# Patient Record
Sex: FEMALE | Race: WHITE | NOT HISPANIC OR LATINO | Employment: OTHER | URBAN - METROPOLITAN AREA
[De-identification: names, ages, dates, MRNs, and addresses within clinical notes are randomized per-mention and may not be internally consistent; named-entity substitution may affect disease eponyms.]

---

## 2017-01-11 RX ORDER — LEVOTHYROXINE SODIUM 0.05 MG/1
TABLET ORAL
Qty: 30 TABLET | Refills: 5 | Status: SHIPPED | OUTPATIENT
Start: 2017-01-11 | End: 2017-07-13 | Stop reason: SDUPTHER

## 2017-02-10 RX ORDER — TEMAZEPAM 7.5 MG/1
CAPSULE ORAL
Qty: 30 CAPSULE | Refills: 0 | OUTPATIENT
Start: 2017-02-10 | End: 2017-03-13 | Stop reason: SDUPTHER

## 2017-03-13 RX ORDER — TEMAZEPAM 7.5 MG/1
CAPSULE ORAL
Qty: 30 CAPSULE | Refills: 2 | OUTPATIENT
Start: 2017-03-13 | End: 2017-06-09 | Stop reason: SDUPTHER

## 2017-04-17 ENCOUNTER — TELEPHONE (OUTPATIENT)
Dept: INTERNAL MEDICINE | Facility: CLINIC | Age: 67
End: 2017-04-17

## 2017-05-12 RX ORDER — BUPROPION HYDROCHLORIDE 300 MG/1
TABLET ORAL
Qty: 30 TABLET | Refills: 5 | Status: SHIPPED | OUTPATIENT
Start: 2017-05-12 | End: 2017-11-03 | Stop reason: SDUPTHER

## 2017-05-15 DIAGNOSIS — E78.2 MIXED HYPERLIPIDEMIA: ICD-10-CM

## 2017-05-15 DIAGNOSIS — Z11.59 NEED FOR HEPATITIS C SCREENING TEST: ICD-10-CM

## 2017-05-15 DIAGNOSIS — Z00.00 HEALTH CARE MAINTENANCE: Primary | ICD-10-CM

## 2017-05-15 DIAGNOSIS — E55.9 VITAMIN D DEFICIENCY: ICD-10-CM

## 2017-05-15 DIAGNOSIS — M85.80 OSTEOPENIA: ICD-10-CM

## 2017-05-15 DIAGNOSIS — E03.9 ACQUIRED HYPOTHYROIDISM: ICD-10-CM

## 2017-05-15 DIAGNOSIS — F51.01 PRIMARY INSOMNIA: ICD-10-CM

## 2017-05-17 LAB
25(OH)D3+25(OH)D2 SERPL-MCNC: 29.4 NG/ML (ref 30–100)
ALBUMIN SERPL-MCNC: 4.1 G/DL (ref 3.5–5.2)
ALBUMIN/GLOB SERPL: 1.7 G/DL
ALP SERPL-CCNC: 49 U/L (ref 39–117)
ALT SERPL-CCNC: 16 U/L (ref 1–33)
APPEARANCE UR: CLEAR
AST SERPL-CCNC: 15 U/L (ref 1–32)
BACTERIA #/AREA URNS HPF: NORMAL /HPF
BASOPHILS # BLD AUTO: 0.04 10*3/MM3 (ref 0–0.2)
BASOPHILS NFR BLD AUTO: 0.5 % (ref 0–1.5)
BILIRUB SERPL-MCNC: 0.3 MG/DL (ref 0.1–1.2)
BILIRUB UR QL STRIP: NEGATIVE
BUN SERPL-MCNC: 16 MG/DL (ref 8–23)
BUN/CREAT SERPL: 18.4 (ref 7–25)
CALCIUM SERPL-MCNC: 9.4 MG/DL (ref 8.6–10.5)
CHLORIDE SERPL-SCNC: 102 MMOL/L (ref 98–107)
CHOLEST SERPL-MCNC: 288 MG/DL (ref 0–200)
CO2 SERPL-SCNC: 27.3 MMOL/L (ref 22–29)
COLOR UR: YELLOW
CREAT SERPL-MCNC: 0.87 MG/DL (ref 0.57–1)
EOSINOPHIL # BLD AUTO: 0.26 10*3/MM3 (ref 0–0.7)
EOSINOPHIL NFR BLD AUTO: 3.5 % (ref 0.3–6.2)
EPI CELLS #/AREA URNS HPF: NORMAL /HPF
ERYTHROCYTE [DISTWIDTH] IN BLOOD BY AUTOMATED COUNT: 13.4 % (ref 11.7–13)
GLOBULIN SER CALC-MCNC: 2.4 GM/DL
GLUCOSE SERPL-MCNC: 97 MG/DL (ref 65–99)
GLUCOSE UR QL: NEGATIVE
HCT VFR BLD AUTO: 44.9 % (ref 35.6–45.5)
HCV AB S/CO SERPL IA: <0.1 S/CO RATIO (ref 0–0.9)
HDLC SERPL-MCNC: 77 MG/DL (ref 40–60)
HGB BLD-MCNC: 14.8 G/DL (ref 11.9–15.5)
HGB UR QL STRIP: NEGATIVE
IMM GRANULOCYTES # BLD: 0.04 10*3/MM3 (ref 0–0.03)
IMM GRANULOCYTES NFR BLD: 0.5 % (ref 0–0.5)
KETONES UR QL STRIP: NEGATIVE
LDLC SERPL CALC-MCNC: 183 MG/DL (ref 0–100)
LEUKOCYTE ESTERASE UR QL STRIP: NEGATIVE
LYMPHOCYTES # BLD AUTO: 2.05 10*3/MM3 (ref 0.9–4.8)
LYMPHOCYTES NFR BLD AUTO: 27.3 % (ref 19.6–45.3)
MCH RBC QN AUTO: 30.3 PG (ref 26.9–32)
MCHC RBC AUTO-ENTMCNC: 33 G/DL (ref 32.4–36.3)
MCV RBC AUTO: 91.8 FL (ref 80.5–98.2)
MICRO URNS: NORMAL
MICRO URNS: NORMAL
MONOCYTES # BLD AUTO: 0.46 10*3/MM3 (ref 0.2–1.2)
MONOCYTES NFR BLD AUTO: 6.1 % (ref 5–12)
NEUTROPHILS # BLD AUTO: 4.67 10*3/MM3 (ref 1.9–8.1)
NEUTROPHILS NFR BLD AUTO: 62.1 % (ref 42.7–76)
NITRITE UR QL STRIP: NEGATIVE
PH UR STRIP: 7.5 [PH] (ref 5–7.5)
PLATELET # BLD AUTO: 269 10*3/MM3 (ref 140–500)
POTASSIUM SERPL-SCNC: 4.5 MMOL/L (ref 3.5–5.2)
PROT SERPL-MCNC: 6.5 G/DL (ref 6–8.5)
PROT UR QL STRIP: NEGATIVE
RBC # BLD AUTO: 4.89 10*6/MM3 (ref 3.9–5.2)
RBC #/AREA URNS HPF: NORMAL /HPF
SODIUM SERPL-SCNC: 142 MMOL/L (ref 136–145)
SP GR UR: 1.01 (ref 1–1.03)
TRIGL SERPL-MCNC: 139 MG/DL (ref 0–150)
TSH SERPL DL<=0.005 MIU/L-ACNC: 1.63 MIU/ML (ref 0.27–4.2)
URINALYSIS REFLEX: NORMAL
UROBILINOGEN UR STRIP-MCNC: 0.2 MG/DL (ref 0.2–1)
VLDLC SERPL CALC-MCNC: 27.8 MG/DL (ref 5–40)
WBC # BLD AUTO: 7.52 10*3/MM3 (ref 4.5–10.7)
WBC #/AREA URNS HPF: NORMAL /HPF

## 2017-05-23 ENCOUNTER — OFFICE VISIT (OUTPATIENT)
Dept: INTERNAL MEDICINE | Facility: CLINIC | Age: 67
End: 2017-05-23

## 2017-05-23 VITALS
OXYGEN SATURATION: 98 % | DIASTOLIC BLOOD PRESSURE: 78 MMHG | HEIGHT: 62 IN | HEART RATE: 79 BPM | BODY MASS INDEX: 24.84 KG/M2 | SYSTOLIC BLOOD PRESSURE: 122 MMHG | RESPIRATION RATE: 18 BRPM | WEIGHT: 135 LBS

## 2017-05-23 DIAGNOSIS — E03.9 ACQUIRED HYPOTHYROIDISM: ICD-10-CM

## 2017-05-23 DIAGNOSIS — F51.01 PRIMARY INSOMNIA: ICD-10-CM

## 2017-05-23 DIAGNOSIS — E78.2 MIXED HYPERLIPIDEMIA: ICD-10-CM

## 2017-05-23 DIAGNOSIS — Z00.00 MEDICARE ANNUAL WELLNESS VISIT, SUBSEQUENT: Primary | ICD-10-CM

## 2017-05-23 DIAGNOSIS — M79.7 FIBROMYALGIA: ICD-10-CM

## 2017-05-23 PROCEDURE — G0439 PPPS, SUBSEQ VISIT: HCPCS | Performed by: INTERNAL MEDICINE

## 2017-05-23 PROCEDURE — 99213 OFFICE O/P EST LOW 20 MIN: CPT | Performed by: INTERNAL MEDICINE

## 2017-06-09 RX ORDER — TEMAZEPAM 7.5 MG/1
CAPSULE ORAL
Qty: 30 CAPSULE | Refills: 2 | Status: SHIPPED | OUTPATIENT
Start: 2017-06-09 | End: 2017-09-07 | Stop reason: SDUPTHER

## 2017-06-09 NOTE — TELEPHONE ENCOUNTER
Restoril refill.  Jacinto UTD 3/14/2017  Narc UTD 9/28/2016  Last refill 3/13/2017  Last OV 5/23/2017

## 2017-06-12 RX ORDER — TEMAZEPAM 7.5 MG/1
CAPSULE ORAL
Qty: 30 CAPSULE | Refills: 2 | OUTPATIENT
Start: 2017-06-12

## 2017-06-12 NOTE — TELEPHONE ENCOUNTER
Approved on Friday, was not called in by covering staff. Calling in this morning and will talk to pharmacy for clarification.

## 2017-07-13 RX ORDER — LEVOTHYROXINE SODIUM 0.05 MG/1
TABLET ORAL
Qty: 30 TABLET | Refills: 5 | Status: SHIPPED | OUTPATIENT
Start: 2017-07-13 | End: 2017-12-30 | Stop reason: SDUPTHER

## 2017-08-28 ENCOUNTER — APPOINTMENT (OUTPATIENT)
Dept: WOMENS IMAGING | Facility: HOSPITAL | Age: 67
End: 2017-08-28

## 2017-08-28 PROCEDURE — G0202 SCR MAMMO BI INCL CAD: HCPCS | Performed by: RADIOLOGY

## 2017-08-28 PROCEDURE — 77063 BREAST TOMOSYNTHESIS BI: CPT | Performed by: RADIOLOGY

## 2017-09-07 RX ORDER — TEMAZEPAM 7.5 MG/1
CAPSULE ORAL
Qty: 30 CAPSULE | Refills: 2 | OUTPATIENT
Start: 2017-09-07 | End: 2017-12-01 | Stop reason: SDUPTHER

## 2017-11-03 RX ORDER — BUPROPION HYDROCHLORIDE 300 MG/1
TABLET ORAL
Qty: 30 TABLET | Refills: 5 | Status: SHIPPED | OUTPATIENT
Start: 2017-11-03 | End: 2018-05-31 | Stop reason: SDUPTHER

## 2017-12-04 RX ORDER — TEMAZEPAM 7.5 MG/1
CAPSULE ORAL
Qty: 30 CAPSULE | Refills: 2 | OUTPATIENT
Start: 2017-12-04 | End: 2018-03-02 | Stop reason: SDUPTHER

## 2017-12-05 RX ORDER — TEMAZEPAM 7.5 MG/1
CAPSULE ORAL
Qty: 30 CAPSULE | Refills: 2 | OUTPATIENT
Start: 2017-12-05

## 2018-01-02 RX ORDER — LEVOTHYROXINE SODIUM 0.05 MG/1
TABLET ORAL
Qty: 30 TABLET | Refills: 5 | Status: SHIPPED | OUTPATIENT
Start: 2018-01-02 | End: 2018-05-24 | Stop reason: SDUPTHER

## 2018-01-03 ENCOUNTER — OFFICE VISIT (OUTPATIENT)
Dept: INTERNAL MEDICINE | Facility: CLINIC | Age: 68
End: 2018-01-03

## 2018-01-03 VITALS
HEART RATE: 93 BPM | RESPIRATION RATE: 18 BRPM | WEIGHT: 139 LBS | SYSTOLIC BLOOD PRESSURE: 122 MMHG | DIASTOLIC BLOOD PRESSURE: 84 MMHG | HEIGHT: 62 IN | OXYGEN SATURATION: 98 % | BODY MASS INDEX: 25.58 KG/M2 | TEMPERATURE: 98.6 F

## 2018-01-03 DIAGNOSIS — H66.001 ACUTE SUPPURATIVE OTITIS MEDIA OF RIGHT EAR WITHOUT SPONTANEOUS RUPTURE OF TYMPANIC MEMBRANE, RECURRENCE NOT SPECIFIED: Primary | ICD-10-CM

## 2018-01-03 DIAGNOSIS — J02.9 PHARYNGITIS, UNSPECIFIED ETIOLOGY: ICD-10-CM

## 2018-01-03 LAB
EXPIRATION DATE: NORMAL
INTERNAL CONTROL: NORMAL
Lab: NORMAL
S PYO AG THROAT QL: NEGATIVE

## 2018-01-03 PROCEDURE — 87880 STREP A ASSAY W/OPTIC: CPT | Performed by: INTERNAL MEDICINE

## 2018-01-03 PROCEDURE — 99214 OFFICE O/P EST MOD 30 MIN: CPT | Performed by: INTERNAL MEDICINE

## 2018-01-03 RX ORDER — CEFDINIR 300 MG/1
300 CAPSULE ORAL 2 TIMES DAILY
Qty: 20 CAPSULE | Refills: 0 | Status: SHIPPED | OUTPATIENT
Start: 2018-01-03 | End: 2018-01-17

## 2018-01-03 NOTE — PROGRESS NOTES
Chief Complaint  Quyen Jalloh is a 67 y.o. female who presents for URI (Green mucous from nose.); Earache (Right ear, pressure); Headache; and Sore Throat  .    History of Present Illness   Quyen is here for acute care for a new issue.  Yesterday she started with an ear ache.  She can't hear out of her right ear.  No fevers.  Sore throat.  Headache.  She feels terrible.      Review of Systems   Constitution: Positive for malaise/fatigue. Negative for chills and fever.   HENT: Positive for congestion, ear pain and sore throat. Negative for ear discharge.    Neurological: Positive for headaches.       Patient Active Problem List   Diagnosis   • Adrenal cortical adenoma   • Decreased hearing   • Fatigue   • Hyperlipidemia   • Hypothyroidism   • Insomnia   • Seasonal allergic rhinitis   • Clicking shoulder   • Shoulder pain   • Vitamin D deficiency   • Osteopenia   • Fibromyalgia       Past Medical History:   Diagnosis Date   • Bloating    • Depression    • Diverticulitis of colon 1/28/2016    Impression: 11/18/2015 - She had diverticulitis last year.  I think this is a mild flare compared to where she was last year.  Will go ahead and put her on Augmentin for 10 days.  Her WBC is normal now.  Impression: 04/10/2014 - Because of her blood in lucy stool, I am ordering the stat CT.  Will treat as outpt as long as there is not perf seen on CT.  I have put in Augmentin for TX so she doesn't have to use Flagyl.;    • Fibromyalgia     She had been folllowed by Dr. Heineke but he has retired.  She has been unable to find another FM specialist in the area.  She tried seeing Dr. Cordova, but this was a pain clinic and he pushed meds on her that she didn't want to take. She is not interested in narcotics for pain control.  I have agreed to continue her on her current meds and to fill out her disability forms when they come due.  H   • Gallstones    • Hyperlipidemia    • Leukocytosis    • Malignant melanoma of skin    •  Non-specific colitis 1/28/2016    Impression: 12/07/2015 - CT from [facility] reviewed.  There is some generalized edematous wall thickening in lucy entire rectosigmoid colon and some in descending colon.  She is tolerating the Augmentin at this point.  I would like for her to see a GI doctor to try to determine what kind of colitis we are dealing with.  She is free to advance her diet as tolerated and I think this will actually help bulk up her stools.  Her WBC has been normal through all of this.;    • Osteopenia 04/13/2015    She will due for dexa next year.[id number] Appointment   • Polyp of sigmoid colon     Due for colonoscopy.  She will contact Dr. Harry Atwood's office to set this up.   • Tubular adenoma of colon 07/31/2014    c-scope Dr. Ornelas - repeat scope 5  years       Past Surgical History:   Procedure Laterality Date   • CHOLECYSTECTOMY     • COLONOSCOPY  07/31/2014    Dr Ornelas, tubular adenoma x2   • MALIGNANT SKIN LESION EXCISION  2006    legs; melanoma   • TUBAL ABDOMINAL LIGATION         Family History   Problem Relation Age of Onset   • Cancer Mother      lung   • Coronary artery disease Father    • Glaucoma Father    • Lung cancer Father    • Cancer Father      malignant neoplasm of trachea, bronchus and lung   • Stroke Father        Social History     Social History   • Marital status:      Spouse name: N/A   • Number of children: N/A   • Years of education: N/A     Occupational History   • Not on file.     Social History Main Topics   • Smoking status: Never Smoker   • Smokeless tobacco: Never Used   • Alcohol use Yes      Comment: social drinker   • Drug use: No   • Sexual activity: Not on file     Other Topics Concern   • Not on file     Social History Narrative       Current Outpatient Prescriptions on File Prior to Visit   Medication Sig Dispense Refill   • buPROPion XL (WELLBUTRIN XL) 300 MG 24 hr tablet TAKE 1 TABLET BY MOUTH EVERY DAY 30 tablet 5   • CALCIUM PO Take by mouth.    "  • Cholecalciferol (VITAMIN D-3) 1000 UNITS capsule Take by mouth. Take as directed     • levothyroxine (SYNTHROID, LEVOTHROID) 50 MCG tablet TAKE ONE TABLET BY MOUTH EVERY DAY 30 tablet 5   • Magnesium 100 MG capsule Take  by mouth.     • melatonin 5 MG tablet tablet Take by mouth. Take as directed     • Pyridoxine HCl (VITAMIN B-6 PO) Take by mouth.     • temazepam (RESTORIL) 7.5 MG capsule TAKE ONE CAPSULE BY MOUTH EVERY NIGHT AS NEEDED 30 capsule 2     No current facility-administered medications on file prior to visit.        Allergies   Allergen Reactions   • Tetracyclines & Related        Vitals:    01/03/18 1243   BP: 122/84   Pulse: 93   Resp: 18   Temp: 98.6 °F (37 °C)   SpO2: 98%   Weight: 63 kg (139 lb)   Height: 157.5 cm (62.01\")       Body mass index is 25.42 kg/(m^2).    Objective   Physical Exam   Constitutional: She is oriented to person, place, and time. She appears well-developed and well-nourished.  Non-toxic appearance. She has a sickly appearance. No distress.   HENT:   Head: Normocephalic and atraumatic.   Right Ear: External ear normal. No drainage. Tympanic membrane is erythematous and bulging. A middle ear effusion is present. Decreased hearing is noted.   Left Ear: Tympanic membrane, external ear and ear canal normal.   Eyes: Conjunctivae are normal. No scleral icterus.   Neck: Normal range of motion. Neck supple.   Cardiovascular: Normal rate and regular rhythm.    Pulmonary/Chest: Effort normal and breath sounds normal. No respiratory distress. She has no wheezes.   Neurological: She is alert and oriented to person, place, and time. No cranial nerve deficit.   Psychiatric: She has a normal mood and affect. Her behavior is normal.       Results for orders placed or performed in visit on 01/03/18   POC Rapid Strep A   Result Value Ref Range    Rapid Strep A Screen Negative Negative, VALID, INVALID, Not Performed    Internal Control Passed Passed    Lot Number [id number]     Expiration " Date 3/31/2019        Assessment/Plan   Diagnoses and all orders for this visit:    Acute suppurative otitis media of right ear without spontaneous rupture of tympanic membrane, recurrence not specified  -     cefdinir (OMNICEF) 300 MG capsule; Take 1 capsule by mouth 2 (Two) Times a Day.    Pharyngitis, unspecified etiology  -     POC Rapid Strep A        Discussion/Summary  Quyen is here for acute care for a new issue.  She has an acute right otitis media.  Will start omnicef.  Use tylenol or advil as needed for pain.  Her rapid strep was negative.    No Follow-up on file.

## 2018-01-09 ENCOUNTER — TELEPHONE (OUTPATIENT)
Dept: INTERNAL MEDICINE | Facility: CLINIC | Age: 68
End: 2018-01-09

## 2018-01-09 NOTE — TELEPHONE ENCOUNTER
Pt called and stated that she was seen on the 3rd and was given an abx, but still is not feeling better and has a terrible cough, wants to know if there is anything else she can do?    Pt has tried mucinex, delsum, and cough drops, will wait until closer to the abx to update us on her condition.

## 2018-01-10 ENCOUNTER — OFFICE VISIT (OUTPATIENT)
Dept: INTERNAL MEDICINE | Facility: CLINIC | Age: 68
End: 2018-01-10

## 2018-01-10 ENCOUNTER — TELEPHONE (OUTPATIENT)
Dept: INTERNAL MEDICINE | Facility: CLINIC | Age: 68
End: 2018-01-10

## 2018-01-10 VITALS
RESPIRATION RATE: 18 BRPM | BODY MASS INDEX: 25.58 KG/M2 | OXYGEN SATURATION: 99 % | DIASTOLIC BLOOD PRESSURE: 72 MMHG | SYSTOLIC BLOOD PRESSURE: 126 MMHG | HEART RATE: 85 BPM | HEIGHT: 62 IN | WEIGHT: 139 LBS

## 2018-01-10 DIAGNOSIS — R05.9 COUGH: ICD-10-CM

## 2018-01-10 DIAGNOSIS — H66.004 RECURRENT ACUTE SUPPURATIVE OTITIS MEDIA OF RIGHT EAR WITHOUT SPONTANEOUS RUPTURE OF TYMPANIC MEMBRANE: ICD-10-CM

## 2018-01-10 DIAGNOSIS — R06.02 SHORTNESS OF BREATH: ICD-10-CM

## 2018-01-10 LAB
HCT VFR BLDA CALC: 43.6 %
HGB BLDA-MCNC: 14.4 G/DL
LYMPHOCYTES # BLD: 26.7 %
MCH, POC: 29.1
MCHC, POC: 33.1
MCV, POC: 88.1
MONOCYTES # BLD: 8.2 %
PLATELET # BLD: 401 10*3/MM3
PMV BLD: 6.8 FL
POC NEUTROPHIL: 65.1 %
RBC, POC: 4.95
RDW, POC: 12.7
WBC # BLD: 13.5 10*3/UL

## 2018-01-10 PROCEDURE — 85025 COMPLETE CBC W/AUTO DIFF WBC: CPT | Performed by: INTERNAL MEDICINE

## 2018-01-10 PROCEDURE — 99214 OFFICE O/P EST MOD 30 MIN: CPT | Performed by: INTERNAL MEDICINE

## 2018-01-10 PROCEDURE — 71046 X-RAY EXAM CHEST 2 VIEWS: CPT | Performed by: INTERNAL MEDICINE

## 2018-01-10 RX ORDER — LEVOFLOXACIN 750 MG/1
750 TABLET ORAL DAILY
Qty: 5 TABLET | Refills: 0 | Status: SHIPPED | OUTPATIENT
Start: 2018-01-10 | End: 2018-01-17

## 2018-01-10 NOTE — TELEPHONE ENCOUNTER
Pt called about her URI and she is having trouble getting a deep breath.   Pt was trying to wait until Thursday to call, but did not want to wait since she was struggling with SOA.

## 2018-01-10 NOTE — PROGRESS NOTES
Chief Complaint  Quyen Jalloh is a 67 y.o. female who presents for URI (Pt has worsened symptoms, having soa, could not wait until the end of the abx to f/u. )  .    History of Present Illness   Quyen is here for worsening URI symptoms.  She has been on omnicef for 8 days now and has worsening soa.  Her ear got better then got worse.  She is still blowing out a lot of green mucous.  If she moves around a lot she just feels like she has to take a deep breath.  She is coughing up sputum in the morning.  She has headaches with all of this.  She has spasms of coughing.  She is using mucinex.        Review of Systems   Constitution: Positive for malaise/fatigue. Negative for chills, fever and night sweats.   HENT: Positive for congestion, ear pain and sore throat. Negative for ear discharge.    Cardiovascular: Positive for dyspnea on exertion.   Respiratory: Positive for cough, shortness of breath and sputum production.        Patient Active Problem List   Diagnosis   • Adrenal cortical adenoma   • Decreased hearing   • Fatigue   • Hyperlipidemia   • Hypothyroidism   • Insomnia   • Seasonal allergic rhinitis   • Clicking shoulder   • Shoulder pain   • Vitamin D deficiency   • Osteopenia   • Fibromyalgia       Past Medical History:   Diagnosis Date   • Bloating    • Depression    • Diverticulitis of colon 1/28/2016    Impression: 11/18/2015 - She had diverticulitis last year.  I think this is a mild flare compared to where she was last year.  Will go ahead and put her on Augmentin for 10 days.  Her WBC is normal now.  Impression: 04/10/2014 - Because of her blood in lucy stool, I am ordering the stat CT.  Will treat as outpt as long as there is not perf seen on CT.  I have put in Augmentin for TX so she doesn't have to use Flagyl.;    • Fibromyalgia     She had been folllowed by Dr. Heineke but he has retired.  She has been unable to find another FM specialist in the area.  She tried seeing Dr. Cordova, but this was  a pain clinic and he pushed meds on her that she didn't want to take. She is not interested in narcotics for pain control.  I have agreed to continue her on her current meds and to fill out her disability forms when they come due.  H   • Gallstones    • Hyperlipidemia    • Leukocytosis    • Malignant melanoma of skin    • Non-specific colitis 1/28/2016    Impression: 12/07/2015 - CT from [facility] reviewed.  There is some generalized edematous wall thickening in lucy entire rectosigmoid colon and some in descending colon.  She is tolerating the Augmentin at this point.  I would like for her to see a GI doctor to try to determine what kind of colitis we are dealing with.  She is free to advance her diet as tolerated and I think this will actually help bulk up her stools.  Her WBC has been normal through all of this.;    • Osteopenia 04/13/2015    She will due for dexa next year.[id number] Appointment   • Polyp of sigmoid colon     Due for colonoscopy.  She will contact Dr. Harry Atwood's office to set this up.   • Tubular adenoma of colon 07/31/2014    c-scope Dr. Ornelas - repeat scope 5  years       Past Surgical History:   Procedure Laterality Date   • CHOLECYSTECTOMY     • COLONOSCOPY  07/31/2014    Dr Ornelas, tubular adenoma x2   • MALIGNANT SKIN LESION EXCISION  2006    legs; melanoma   • TUBAL ABDOMINAL LIGATION         Family History   Problem Relation Age of Onset   • Cancer Mother      lung   • Coronary artery disease Father    • Glaucoma Father    • Lung cancer Father    • Cancer Father      malignant neoplasm of trachea, bronchus and lung   • Stroke Father        Social History     Social History   • Marital status:      Spouse name: N/A   • Number of children: N/A   • Years of education: N/A     Occupational History   • Not on file.     Social History Main Topics   • Smoking status: Never Smoker   • Smokeless tobacco: Never Used   • Alcohol use Yes      Comment: social drinker   • Drug use: No   •  "Sexual activity: Not on file     Other Topics Concern   • Not on file     Social History Narrative       Current Outpatient Prescriptions on File Prior to Visit   Medication Sig Dispense Refill   • buPROPion XL (WELLBUTRIN XL) 300 MG 24 hr tablet TAKE 1 TABLET BY MOUTH EVERY DAY 30 tablet 5   • CALCIUM PO Take by mouth.     • cefdinir (OMNICEF) 300 MG capsule Take 1 capsule by mouth 2 (Two) Times a Day. 20 capsule 0   • Cholecalciferol (VITAMIN D-3) 1000 UNITS capsule Take by mouth. Take as directed     • levothyroxine (SYNTHROID, LEVOTHROID) 50 MCG tablet TAKE ONE TABLET BY MOUTH EVERY DAY 30 tablet 5   • Magnesium 100 MG capsule Take  by mouth.     • melatonin 5 MG tablet tablet Take by mouth. Take as directed     • Pyridoxine HCl (VITAMIN B-6 PO) Take by mouth.     • temazepam (RESTORIL) 7.5 MG capsule TAKE ONE CAPSULE BY MOUTH EVERY NIGHT AS NEEDED 30 capsule 2     No current facility-administered medications on file prior to visit.        Allergies   Allergen Reactions   • Tetracyclines & Related        Vitals:    01/10/18 1223   BP: 126/72   Pulse: 85   Resp: 18   SpO2: 99%   Weight: 63 kg (139 lb)   Height: 157.5 cm (62.01\")       Body mass index is 25.42 kg/(m^2).    Objective   Physical Exam   Constitutional: She is oriented to person, place, and time. She appears well-developed and well-nourished.  Non-toxic appearance. She appears ill. No distress.   HENT:   Right Ear: Tympanic membrane is erythematous and bulging. Tympanic membrane is not perforated. A middle ear effusion is present.   Left Ear: Tympanic membrane, external ear and ear canal normal.   Mouth/Throat: Mucous membranes are normal. Posterior oropharyngeal erythema present.   Cardiovascular: Normal rate and regular rhythm.    Pulmonary/Chest: Effort normal and breath sounds normal. No respiratory distress. She has no wheezes. She has no rales.   Neurological: She is alert and oriented to person, place, and time. No cranial nerve deficit. "   Psychiatric: She has a normal mood and affect. Her behavior is normal.       Results for orders placed or performed in visit on 01/10/18   POC CBC With / Auto Diff   Result Value Ref Range    WBC 13.5     RBC 4.95     Hemoglobin 14.4 g/dL    Hematocrit 43.6 %    MCV 88.1     MCH 29.1     MCHC 33.1     RDW-CV 12.7     MPV 6.8     Platelets 401 10*3/mm3    Neutrophil Rel % 65.1 %    Monocyte Rel % 8.2 %    Lymphocyte Rel % 26.7 %     CXR PA & Lat  Reason for exam: cough. soa  Film for comparison:  none  Results:  No infiltrate; epicardial fat pad    Assessment/Plan   Diagnoses and all orders for this visit:    Recurrent acute suppurative otitis media of right ear without spontaneous rupture of tympanic membrane    Cough  -     POC CBC With / Auto Diff    Shortness of breath    Other orders  -     levoFLOXacin (LEVAQUIN) 750 MG tablet; Take 1 tablet by mouth Daily.        Discussion/Summary  Quyen is here for acute care for worsening URI symptoms.  Her white count is 13.5 despite being on omnicef for 8 days.  I am switching to Levaquin 750mg .  Her R ear still looks infected.  I do not see an infiltrate on her CXR but given her SOA and her white count, I am going to use the higher dose of levaquin.    No Follow-up on file.

## 2018-01-17 ENCOUNTER — OFFICE VISIT (OUTPATIENT)
Dept: GASTROENTEROLOGY | Facility: CLINIC | Age: 68
End: 2018-01-17

## 2018-01-17 VITALS
TEMPERATURE: 98 F | DIASTOLIC BLOOD PRESSURE: 74 MMHG | SYSTOLIC BLOOD PRESSURE: 122 MMHG | HEIGHT: 61 IN | BODY MASS INDEX: 26.55 KG/M2 | WEIGHT: 140.6 LBS

## 2018-01-17 DIAGNOSIS — R14.0 ABDOMINAL BLOATING: ICD-10-CM

## 2018-01-17 DIAGNOSIS — K63.89 SMALL INTESTINAL BACTERIAL OVERGROWTH: ICD-10-CM

## 2018-01-17 DIAGNOSIS — K58.2 IRRITABLE BOWEL SYNDROME WITH BOTH CONSTIPATION AND DIARRHEA: Primary | ICD-10-CM

## 2018-01-17 PROCEDURE — 99213 OFFICE O/P EST LOW 20 MIN: CPT | Performed by: NURSE PRACTITIONER

## 2018-01-17 RX ORDER — VITAMIN B COMPLEX
CAPSULE ORAL DAILY
COMMUNITY
End: 2019-07-17

## 2018-01-17 NOTE — PROGRESS NOTES
Chief Complaint   Patient presents with   • Irritable Bowel Syndrome         HPI    Pt is being seen today for A follow-up.  She was last seen by Dr. Zavala in June 2016.  She is a history of microscopic colitis treated with Entocort.  History of irritable bowel syndrome with alternating constipation and diarrhea.  She reports several days of loose stools with fecal urgency particularly in the morning.  She denies incontinence of stool.  This will be followed by constipation times several days which consist of hard stools with a sense of incomplete evacuation.  Associated symptoms included abdominal bloating and generalized discomfort particularly when constipated.  She does eat a fiber rich diet and takes a fiber supplement and takes a probiotic daily.  she denies weight loss, abdominal pain, fever, chills, hematochezia or melena.    Last colonoscopy performed in 2016 with repeat recommended in 2021.    Past Medical History:   Diagnosis Date   • Bloating    • Depression    • Diverticulitis of colon 1/28/2016    Impression: 11/18/2015 - She had diverticulitis last year.  I think this is a mild flare compared to where she was last year.  Will go ahead and put her on Augmentin for 10 days.  Her WBC is normal now.  Impression: 04/10/2014 - Because of her blood in lucy stool, I am ordering the stat CT.  Will treat as outpt as long as there is not perf seen on CT.  I have put in Augmentin for TX so she doesn't have to use Flagyl.;    • Fibromyalgia     She had been folllowed by Dr. Heineke but he has retired.  She has been unable to find another FM specialist in the area.  She tried seeing Dr. Cordova, but this was a pain clinic and he pushed meds on her that she didn't want to take. She is not interested in narcotics for pain control.  I have agreed to continue her on her current meds and to fill out her disability forms when they come due.  H   • Gallstones    • Hyperlipidemia    • Leukocytosis    • Malignant melanoma  of skin    • Non-specific colitis 1/28/2016    Impression: 12/07/2015 - CT from [facility] reviewed.  There is some generalized edematous wall thickening in lucy entire rectosigmoid colon and some in descending colon.  She is tolerating the Augmentin at this point.  I would like for her to see a GI doctor to try to determine what kind of colitis we are dealing with.  She is free to advance her diet as tolerated and I think this will actually help bulk up her stools.  Her WBC has been normal through all of this.;    • Osteopenia 04/13/2015    She will due for dexa next year.[id number] Appointment   • Polyp of sigmoid colon     Due for colonoscopy.  She will contact Dr. Harry Atwood's office to set this up.   • Tubular adenoma of colon 07/31/2014    c-scope Dr. Ornelas - repeat scope 5  years     Past Surgical History:   Procedure Laterality Date   • CHOLECYSTECTOMY     • COLONOSCOPY  07/31/2014    Dr Ornelas, tubular adenoma x2   • MALIGNANT SKIN LESION EXCISION  2006    legs; melanoma   • TUBAL ABDOMINAL LIGATION         Current Outpatient Prescriptions   Medication Sig Dispense Refill   • B Complex Vitamins (VITAMIN B COMPLEX) capsule capsule Take  by mouth Daily.     • buPROPion XL (WELLBUTRIN XL) 300 MG 24 hr tablet TAKE 1 TABLET BY MOUTH EVERY DAY 30 tablet 5   • CALCIUM PO Take by mouth.     • Cholecalciferol (VITAMIN D-3) 1000 UNITS capsule Take by mouth. Take as directed     • levothyroxine (SYNTHROID, LEVOTHROID) 50 MCG tablet TAKE ONE TABLET BY MOUTH EVERY DAY 30 tablet 5   • Magnesium 100 MG capsule Take  by mouth.     • melatonin 5 MG tablet tablet Take by mouth. Take as directed     • Probiotic Product (PROBIOTIC PO) Take  by mouth.     • temazepam (RESTORIL) 7.5 MG capsule TAKE ONE CAPSULE BY MOUTH EVERY NIGHT AS NEEDED 30 capsule 2   • Pyridoxine HCl (VITAMIN B-6 PO) Take by mouth.     • rifaximin (XIFAXAN) 550 MG tablet Take 1 tablet by mouth 3 (Three) Times a Day. 42 tablet 2     No current  "facility-administered medications for this visit.        PRN Meds:.    Allergies   Allergen Reactions   • Tetracyclines & Related        Social History     Social History   • Marital status:      Spouse name: N/A   • Number of children: N/A   • Years of education: N/A     Occupational History   • Not on file.     Social History Main Topics   • Smoking status: Never Smoker   • Smokeless tobacco: Never Used   • Alcohol use Yes      Comment: social drinker   • Drug use: No   • Sexual activity: Not on file     Other Topics Concern   • Not on file     Social History Narrative       Family History   Problem Relation Age of Onset   • Cancer Mother      lung   • Coronary artery disease Father    • Glaucoma Father    • Lung cancer Father    • Cancer Father      malignant neoplasm of trachea, bronchus and lung   • Stroke Father        Review of Systems   Constitutional: Negative for appetite change, chills, diaphoresis, fatigue, fever and unexpected weight change.   HENT: Negative for trouble swallowing.    Respiratory: Negative for choking and shortness of breath.    Cardiovascular: Negative for chest pain.   Gastrointestinal: Positive for abdominal distention, constipation and diarrhea. Negative for abdominal pain, blood in stool, nausea and vomiting.   Musculoskeletal: Negative for back pain.   Skin: Negative for color change.   Allergic/Immunologic: Negative for immunocompromised state.   Neurological: Negative for dizziness.   Hematological: Does not bruise/bleed easily.   Psychiatric/Behavioral: Negative.        Vitals:    01/17/18 1416   BP: 122/74   Temp: 98 °F (36.7 °C)     /74 (BP Location: Left arm, Patient Position: Sitting)  Temp 98 °F (36.7 °C) (Oral)   Ht 154.9 cm (61\")  Wt 63.8 kg (140 lb 9.6 oz)  BMI 26.57 kg/m2  Physical Exam   Constitutional: She is oriented to person, place, and time. She appears well-developed and well-nourished.   HENT:   Head: Normocephalic and atraumatic.   Abdominal: " Soft. Bowel sounds are normal. She exhibits no distension and no mass. There is no tenderness. No hernia.   Neurological: She is alert and oriented to person, place, and time.   Skin: Skin is warm and dry.   Psychiatric: She has a normal mood and affect. Her behavior is normal. Judgment and thought content normal.   Vitals reviewed.      ASSESSMENT AND PLAN    Quyen was seen today for irritable bowel syndrome.    Diagnoses and all orders for this visit:    Irritable bowel syndrome with both constipation and diarrhea  Comments:  Xifaxan 14 days with 2 refills. Continue probiotic daily and fiber supplement     Small intestinal bacterial overgrowth    Abdominal bloating  Comments:  IBgard as needed    Other orders  -     rifaximin (XIFAXAN) 550 MG tablet; Take 1 tablet by mouth 3 (Three) Times a Day.    Begin a fiber supplement.  Benefiber, Citrucel or Metamucil is acceptable.  Fiber gummies are also acceptable.  Please take 1 dose of fiber in the morning and 1 in the evening for 4 weeks and increase to 2 doses of fiber in the morning and 2 in the evening after that. Please  try to maintain a high-fiber diet.  We obtain 10 g of fiber from a high-fiber diet every day.  Women should consume a total of 25 grams of fiber a day, men 30 grams a day.  We can reach the total daily recommended dose of fiber a day utilizing the high-fiber diet and also fiber supplements.Please begin a probiotic.  You can try activia yogurt, align, or florastor.  These can be found over-the-counter at a local grocery store.      Proceed with trial of Xifaxan 550 mg 3 times a day for IBS with SIBO. Follow up in 3 months.   For any additional questions, concerns or changes to your condition after today's office visit please contact the office at [phone].        Eva Hurst  APRLEE   [facility]  [address] [place] [address]  Office: [phone]

## 2018-01-19 ENCOUNTER — PRIOR AUTHORIZATION (OUTPATIENT)
Dept: GASTROENTEROLOGY | Facility: CLINIC | Age: 68
End: 2018-01-19

## 2018-02-05 ENCOUNTER — TELEPHONE (OUTPATIENT)
Dept: INTERNAL MEDICINE | Facility: CLINIC | Age: 68
End: 2018-02-05

## 2018-02-05 DIAGNOSIS — H91.90 DECREASED HEARING, UNSPECIFIED LATERALITY: Primary | ICD-10-CM

## 2018-02-05 NOTE — TELEPHONE ENCOUNTER
Pt called, unsure if she needs to be seen again, has been on two rounds of abx's, finished the levaquin on the 16th of January. For an ear infection. States that she has no ear pain, but still clogged feeling, cannot hear very well. Starting to get her uri symptoms back again. Unsure if she needs to see an ENT or if this will just take a long time to go away with such fluid build up.

## 2018-02-09 ENCOUNTER — OFFICE VISIT (OUTPATIENT)
Dept: INTERNAL MEDICINE | Facility: CLINIC | Age: 68
End: 2018-02-09

## 2018-02-09 ENCOUNTER — TELEPHONE (OUTPATIENT)
Dept: INTERNAL MEDICINE | Facility: CLINIC | Age: 68
End: 2018-02-09

## 2018-02-09 VITALS
OXYGEN SATURATION: 99 % | SYSTOLIC BLOOD PRESSURE: 156 MMHG | DIASTOLIC BLOOD PRESSURE: 60 MMHG | BODY MASS INDEX: 26.45 KG/M2 | TEMPERATURE: 98.8 F | HEART RATE: 90 BPM | WEIGHT: 140 LBS

## 2018-02-09 DIAGNOSIS — R05.9 COUGH: Primary | ICD-10-CM

## 2018-02-09 DIAGNOSIS — J40 BRONCHITIS: ICD-10-CM

## 2018-02-09 PROCEDURE — 71046 X-RAY EXAM CHEST 2 VIEWS: CPT | Performed by: INTERNAL MEDICINE

## 2018-02-09 PROCEDURE — 99214 OFFICE O/P EST MOD 30 MIN: CPT | Performed by: INTERNAL MEDICINE

## 2018-02-09 PROCEDURE — 94640 AIRWAY INHALATION TREATMENT: CPT | Performed by: INTERNAL MEDICINE

## 2018-02-09 RX ORDER — ALBUTEROL SULFATE 90 UG/1
2 AEROSOL, METERED RESPIRATORY (INHALATION) EVERY 4 HOURS PRN
Qty: 1 INHALER | Refills: 4 | Status: SHIPPED | OUTPATIENT
Start: 2018-02-09 | End: 2018-06-06

## 2018-02-09 RX ORDER — BENZONATATE 100 MG/1
100 CAPSULE ORAL 3 TIMES DAILY PRN
Qty: 40 CAPSULE | Refills: 1 | Status: SHIPPED | OUTPATIENT
Start: 2018-02-09 | End: 2018-04-16

## 2018-02-09 RX ORDER — GUAIFENESIN AND CODEINE PHOSPHATE 100; 10 MG/5ML; MG/5ML
5 SOLUTION ORAL 3 TIMES DAILY PRN
Qty: 125 ML | Refills: 1 | Status: SHIPPED | OUTPATIENT
Start: 2018-02-09 | End: 2018-02-13 | Stop reason: SDUPTHER

## 2018-02-09 RX ORDER — METHYLPREDNISOLONE 4 MG/1
TABLET ORAL
Qty: 21 EACH | Refills: 0 | Status: SHIPPED | OUTPATIENT
Start: 2018-02-09 | End: 2018-04-16

## 2018-02-09 RX ORDER — LEVALBUTEROL INHALATION SOLUTION 0.63 MG/3ML
0.63 SOLUTION RESPIRATORY (INHALATION) EVERY 6 HOURS PRN
Status: DISCONTINUED | OUTPATIENT
Start: 2018-02-09 | End: 2018-09-20

## 2018-02-09 RX ADMIN — LEVALBUTEROL INHALATION SOLUTION 0.63 MG: 0.63 SOLUTION RESPIRATORY (INHALATION) at 17:04

## 2018-02-09 NOTE — PROGRESS NOTES
Chief Complaint   Patient presents with   • Cough       History of Present Illness   Quyen Jalloh is a 67 y.o. female presents for acute care. She is new to me and a patient of Dr. Lunsford. She has felt unwell for greater than 3 weeks. Patient reports having had flu and then bronchitis. She has been treated with omnicef and levaquin. She felt some improvement but is now having a recurrence of symptoms particularly with cough. Delsym without benefit. She is now developing pain from the cough in the anterior chest wall. Patient relates a history of fibromyalgia with increased pain during her sickness.      The following portions of the patient's history were reviewed and updated as appropriate: allergies, current medications, past family history, past medical history, past social history, past surgical history and problem list.  Current Outpatient Prescriptions on File Prior to Visit   Medication Sig Dispense Refill   • B Complex Vitamins (VITAMIN B COMPLEX) capsule capsule Take  by mouth Daily.     • buPROPion XL (WELLBUTRIN XL) 300 MG 24 hr tablet TAKE 1 TABLET BY MOUTH EVERY DAY 30 tablet 5   • CALCIUM PO Take by mouth.     • Cholecalciferol (VITAMIN D-3) 1000 UNITS capsule Take by mouth. Take as directed     • levothyroxine (SYNTHROID, LEVOTHROID) 50 MCG tablet TAKE ONE TABLET BY MOUTH EVERY DAY 30 tablet 5   • Magnesium 100 MG capsule Take  by mouth.     • melatonin 5 MG tablet tablet Take by mouth. Take as directed     • Probiotic Product (PROBIOTIC PO) Take  by mouth.     • Pyridoxine HCl (VITAMIN B-6 PO) Take by mouth.     • [DISCONTINUED] rifaximin (XIFAXAN) 550 MG tablet Take 1 tablet by mouth 3 (Three) Times a Day. 42 tablet 2   • temazepam (RESTORIL) 7.5 MG capsule TAKE ONE CAPSULE BY MOUTH EVERY NIGHT AS NEEDED 30 capsule 2     No current facility-administered medications on file prior to visit.      Review of Systems   Constitutional: Positive for fatigue. Negative for fever.   HENT: Positive for  ear pain, sinus pain, sinus pressure and sore throat.    Eyes: Negative.    Respiratory: Positive for cough and wheezing.    Cardiovascular: Negative.    Gastrointestinal: Negative.    Endocrine: Negative.    Genitourinary: Negative.    Musculoskeletal: Positive for arthralgias.   Skin: Negative.    Allergic/Immunologic: Negative.    Neurological: Positive for headaches.   Hematological: Negative.    Psychiatric/Behavioral: Negative.        Objective   Physical Exam   Constitutional:   Alert. Mild ill appearing but no acute distress.    HENT:   Head: Normocephalic and atraumatic.   Left Ear: External ear normal.   Right tm w/ fluid behind membrane. No erythema  Pharyngeal erythema   Eyes: Conjunctivae and EOM are normal. Pupils are equal, round, and reactive to light.   Neck: Normal range of motion. Neck supple.   Cardiovascular: Normal rate, regular rhythm and normal heart sounds.    Pulmonary/Chest: Effort normal. She has wheezes.   Few scattered wheeze   Musculoskeletal: Normal range of motion.   Neurological: She is alert.   Skin: Skin is warm and dry.   Psychiatric: She has a normal mood and affect. Her behavior is normal. Judgment and thought content normal.   Nursing note and vitals reviewed.     CXR for persistent cough. Clear. Pericardial fat pad noted. Unchanged from last cxr 1/18    /60  Pulse 90  Temp 98.8 °F (37.1 °C)  Wt 63.5 kg (140 lb)  SpO2 99%  BMI 26.45 kg/m2    Assessment/Plan   Diagnoses and all orders for this visit:    Cough  -     XR Chest PA & Lateral  -     levalbuterol (XOPENEX) nebulizer solution 0.63 mg; Take 3 mL by nebulization Every 6 (Six) Hours As Needed for Wheezing.    Bronchitis    Other orders  -     MethylPREDNISolone (MEDROL, GERRY,) 4 MG tablet; Take as directed on package instructions.  -     albuterol (PROVENTIL HFA;VENTOLIN HFA) 108 (90 Base) MCG/ACT inhaler; Inhale 2 puffs Every 4 (Four) Hours As Needed for Wheezing.  -     benzonatate (TESSALON PERLES) 100 MG  capsule; Take 1 capsule by mouth 3 (Three) Times a Day As Needed for Cough.  -     guaifenesin-codeine (GUAIFENESIN AC) 100-10 MG/5ML liquid; Take 5 mL by mouth 3 (Three) Times a Day As Needed for Cough.    patient w/ persistent cough after 2 rounds antibiotics. Suspect post bronchitic bronchospasm. She will be given a medrol dose pack to reduce inflammation. She had notable improvement after a xopenex neb in office. To use ventolin bid x 4 days and then prn. She has some anterior chest wall pain that correlates w/ costochondritis. Codeine cough syrup should help w/ pain. She may also take tylenol. bp is elevated today so she will need to avoid ibuprofen until this improves. She may take tessalon perles when off cheratussin. F/u if symptoms worsen or fail to improve.

## 2018-02-09 NOTE — TELEPHONE ENCOUNTER
Pt calling because she's having pain going down her arm when she coughs since being on an antibiotic. She needs to know what she can do.

## 2018-02-09 NOTE — TELEPHONE ENCOUNTER
Had flu in December and several other infections in January/february.     Developed a spasmodic cough, developed a pain in her chest and down her arms. Has been taking delsym cough medicine to try to keep from coughing.     At night she takes temazepam for sleep, but if there is a certain cough syrup that will make her tired at night she would stop taking the temazepam for the time being.

## 2018-02-12 ENCOUNTER — TELEPHONE (OUTPATIENT)
Dept: INTERNAL MEDICINE | Facility: CLINIC | Age: 68
End: 2018-02-12

## 2018-02-12 NOTE — TELEPHONE ENCOUNTER
Patient was seen by Dr. Lopez on 02-09-18 for bronchitis. Dr. Lopez told patient to call if she is still not feeling better. Patient called today and stated that she feels worse. Would Dr. Dunaway like to work her in for an appointment or what does she suggest for patient.  Best call back number is [phone]

## 2018-02-13 ENCOUNTER — OFFICE VISIT (OUTPATIENT)
Dept: INTERNAL MEDICINE | Facility: CLINIC | Age: 68
End: 2018-02-13

## 2018-02-13 VITALS
DIASTOLIC BLOOD PRESSURE: 74 MMHG | OXYGEN SATURATION: 98 % | RESPIRATION RATE: 18 BRPM | BODY MASS INDEX: 26.62 KG/M2 | WEIGHT: 141 LBS | HEIGHT: 61 IN | SYSTOLIC BLOOD PRESSURE: 132 MMHG | HEART RATE: 79 BPM

## 2018-02-13 DIAGNOSIS — M79.7 FIBROMYALGIA: ICD-10-CM

## 2018-02-13 DIAGNOSIS — J40 BRONCHITIS: Primary | ICD-10-CM

## 2018-02-13 PROCEDURE — 99213 OFFICE O/P EST LOW 20 MIN: CPT | Performed by: INTERNAL MEDICINE

## 2018-02-13 RX ORDER — LOTEPREDNOL ETABONATE 5 MG/ML
SUSPENSION/ DROPS OPHTHALMIC
Refills: 0 | COMMUNITY
Start: 2018-02-01 | End: 2018-07-02

## 2018-02-13 RX ORDER — GUAIFENESIN AND CODEINE PHOSPHATE 100; 10 MG/5ML; MG/5ML
5 SOLUTION ORAL 3 TIMES DAILY PRN
Qty: 125 ML | Refills: 1 | Status: SHIPPED | OUTPATIENT
Start: 2018-02-13 | End: 2018-06-06

## 2018-02-13 NOTE — PROGRESS NOTES
Chief Complaint  Quyen Jalloh is a 67 y.o. female who presents for Cough (Pt still not feeling better, still coughing, cough medicine helping, but not lasting very long. She saw Dr. Lopez on Friday.)  .    History of Present Illness   Quyen is here for follow up on her bronchitis for which she saw Dr. Lopez on Friday (4 days ago).  She was given medrol and guaifenesin and codeine and a ventolin inhaler.  She is not short of breath.  Her whole body hurts when she coughs.  This is her biggest complaint.  It feels like nerve pain that shoots up and down her spine and her shoulders and arms.  She doesn't have any weakness.  She is just trying to keep the cough controlled.  She isn't wheezing.  She is almost finished with the steroid (two pills left).  Her cough isn't any looser.  She is keeping medication on board to help suppress the cough.  She is waking up between 3-5 am with a lot of coughing.  She is drinking plenty of water.   She's not soa.  She has had two negative cxrs.  She grew up in a home with second hand smoke.   Infections have the tendency to settle in her lungs.      Review of Systems   Constitution: Positive for malaise/fatigue. Negative for chills and fever.   Respiratory: Positive for cough. Negative for shortness of breath, sputum production and wheezing.    Musculoskeletal: Positive for back pain and neck pain.   Neurological: Positive for headaches. Negative for focal weakness.       Patient Active Problem List   Diagnosis   • Adrenal cortical adenoma   • Decreased hearing   • Fatigue   • Hyperlipidemia   • Hypothyroidism   • Insomnia   • Seasonal allergic rhinitis   • Clicking shoulder   • Shoulder pain   • Vitamin D deficiency   • Osteopenia   • Fibromyalgia       Past Medical History:   Diagnosis Date   • Bloating    • Depression    • Diverticulitis of colon 1/28/2016    Impression: 11/18/2015 - She had diverticulitis last year.  I think this is a mild flare compared to where she was  last year.  Will go ahead and put her on Augmentin for 10 days.  Her WBC is normal now.  Impression: 04/10/2014 - Because of her blood in lucy stool, I am ordering the stat CT.  Will treat as outpt as long as there is not perf seen on CT.  I have put in Augmentin for TX so she doesn't have to use Flagyl.;    • Fibromyalgia     She had been folllowed by Dr. Heineke but he has retired.  She has been unable to find another FM specialist in the area.  She tried seeing Dr. Cordova, but this was a pain clinic and he pushed meds on her that she didn't want to take. She is not interested in narcotics for pain control.  I have agreed to continue her on her current meds and to fill out her disability forms when they come due.  H   • Gallstones    • Hyperlipidemia    • Leukocytosis    • Malignant melanoma of skin    • Non-specific colitis 1/28/2016    Impression: 12/07/2015 - CT from [facility] reviewed.  There is some generalized edematous wall thickening in lucy entire rectosigmoid colon and some in descending colon.  She is tolerating the Augmentin at this point.  I would like for her to see a GI doctor to try to determine what kind of colitis we are dealing with.  She is free to advance her diet as tolerated and I think this will actually help bulk up her stools.  Her WBC has been normal through all of this.;    • Osteopenia 04/13/2015    She will due for dexa next year.[id number] Appointment   • Polyp of sigmoid colon     Due for colonoscopy.  She will contact Dr. Harry Atwood's office to set this up.   • Tubular adenoma of colon 07/31/2014    c-scope Dr. Ornelas - repeat scope 5  years       Past Surgical History:   Procedure Laterality Date   • CHOLECYSTECTOMY     • COLONOSCOPY  07/31/2014    Dr Ornelas, tubular adenoma x2   • MALIGNANT SKIN LESION EXCISION  2006    legs; melanoma   • TUBAL ABDOMINAL LIGATION         Family History   Problem Relation Age of Onset   • Cancer Mother      lung   • Coronary artery disease Father     • Glaucoma Father    • Lung cancer Father    • Cancer Father      malignant neoplasm of trachea, bronchus and lung   • Stroke Father        Social History     Social History   • Marital status:      Spouse name: N/A   • Number of children: N/A   • Years of education: N/A     Occupational History   • Not on file.     Social History Main Topics   • Smoking status: Never Smoker   • Smokeless tobacco: Never Used   • Alcohol use Yes      Comment: social drinker   • Drug use: No   • Sexual activity: Not on file     Other Topics Concern   • Not on file     Social History Narrative       Current Outpatient Prescriptions on File Prior to Visit   Medication Sig Dispense Refill   • albuterol (PROVENTIL HFA;VENTOLIN HFA) 108 (90 Base) MCG/ACT inhaler Inhale 2 puffs Every 4 (Four) Hours As Needed for Wheezing. 1 inhaler 4   • B Complex Vitamins (VITAMIN B COMPLEX) capsule capsule Take  by mouth Daily.     • benzonatate (TESSALON PERLES) 100 MG capsule Take 1 capsule by mouth 3 (Three) Times a Day As Needed for Cough. 40 capsule 1   • buPROPion XL (WELLBUTRIN XL) 300 MG 24 hr tablet TAKE 1 TABLET BY MOUTH EVERY DAY 30 tablet 5   • CALCIUM PO Take by mouth.     • Cholecalciferol (VITAMIN D-3) 1000 UNITS capsule Take by mouth. Take as directed     • levothyroxine (SYNTHROID, LEVOTHROID) 50 MCG tablet TAKE ONE TABLET BY MOUTH EVERY DAY 30 tablet 5   • Magnesium 100 MG capsule Take  by mouth.     • melatonin 5 MG tablet tablet Take by mouth. Take as directed     • MethylPREDNISolone (MEDROL, GERRY,) 4 MG tablet Take as directed on package instructions. 21 each 0   • Probiotic Product (PROBIOTIC PO) Take  by mouth.     • Pyridoxine HCl (VITAMIN B-6 PO) Take by mouth.     • temazepam (RESTORIL) 7.5 MG capsule TAKE ONE CAPSULE BY MOUTH EVERY NIGHT AS NEEDED 30 capsule 2   • [DISCONTINUED] guaifenesin-codeine (GUAIFENESIN AC) 100-10 MG/5ML liquid Take 5 mL by mouth 3 (Three) Times a Day As Needed for Cough. 125 mL 1     Current  "Facility-Administered Medications on File Prior to Visit   Medication Dose Route Frequency Provider Last Rate Last Dose   • levalbuterol (XOPENEX) nebulizer solution 0.63 mg  0.63 mg Nebulization Q6H PRN Monica Lopez MD   0.63 mg at 02/09/18 1704       Allergies   Allergen Reactions   • Tetracyclines & Related        Vitals:    02/13/18 1134   BP: 132/74   Pulse: 79   Resp: 18   SpO2: 98%   Weight: 64 kg (141 lb)   Height: 154.9 cm (61\")       Body mass index is 26.64 kg/(m^2).    Objective   Physical Exam   Constitutional: She is oriented to person, place, and time. She appears well-developed and well-nourished. No distress.   HENT:   Head: Normocephalic and atraumatic.   Mouth/Throat: Oropharynx is clear and moist.   Eyes: Conjunctivae are normal. No scleral icterus.   Neck: Normal range of motion. Neck supple.   Cardiovascular: Normal rate and regular rhythm.    Pulmonary/Chest: Effort normal and breath sounds normal. No respiratory distress. She has no wheezes. She has no rales.   Lymphadenopathy:     She has no cervical adenopathy.   Neurological: She is alert and oriented to person, place, and time. No cranial nerve deficit.   Psychiatric: She has a normal mood and affect. Her behavior is normal.           Assessment/Plan   Diagnoses and all orders for this visit:    Bronchitis    Fibromyalgia    Other orders  -     LOTEMAX 0.5 % ophthalmic suspension; INSTILL 1 DROP 3 TIMES A DAY INTO BOTH EYES  -     guaifenesin-codeine (GUAIFENESIN AC) 100-10 MG/5ML liquid; Take 5 mL by mouth 3 (Three) Times a Day As Needed for Cough.      Discussion/Summary  Pieter is here for acute care for follow up on her bronchitis.  I have encouraged her to finish the steroids, continue to use the cough suppressant cough medication.    Let's see how she is over the next few days.  Certainly, if she is not getting any better we can order a CT chest.  If her pains from coughing persist we can work those up as well.    No Follow-up on " file.

## 2018-03-05 RX ORDER — TEMAZEPAM 7.5 MG/1
CAPSULE ORAL
Qty: 30 CAPSULE | Refills: 2 | OUTPATIENT
Start: 2018-03-05 | End: 2018-07-02

## 2018-04-12 ENCOUNTER — TELEPHONE (OUTPATIENT)
Dept: INTERNAL MEDICINE | Facility: CLINIC | Age: 68
End: 2018-04-12

## 2018-04-16 ENCOUNTER — OFFICE VISIT (OUTPATIENT)
Dept: GASTROENTEROLOGY | Facility: CLINIC | Age: 68
End: 2018-04-16

## 2018-04-16 VITALS
BODY MASS INDEX: 26.09 KG/M2 | SYSTOLIC BLOOD PRESSURE: 118 MMHG | HEIGHT: 62 IN | DIASTOLIC BLOOD PRESSURE: 78 MMHG | WEIGHT: 141.8 LBS | TEMPERATURE: 97.7 F

## 2018-04-16 DIAGNOSIS — K58.2 IRRITABLE BOWEL SYNDROME WITH BOTH CONSTIPATION AND DIARRHEA: Primary | ICD-10-CM

## 2018-04-16 DIAGNOSIS — R14.0 ABDOMINAL BLOATING: ICD-10-CM

## 2018-04-16 DIAGNOSIS — K52.839 MICROSCOPIC COLITIS, UNSPECIFIED MICROSCOPIC COLITIS TYPE: ICD-10-CM

## 2018-04-16 DIAGNOSIS — K63.89 SMALL INTESTINAL BACTERIAL OVERGROWTH: ICD-10-CM

## 2018-04-16 PROCEDURE — 99214 OFFICE O/P EST MOD 30 MIN: CPT | Performed by: NURSE PRACTITIONER

## 2018-04-16 NOTE — PROGRESS NOTES
Chief Complaint   Patient presents with   • Follow-up     Colitis (patient feels better)       Quyen Jalloh is a  67 y.o. female here for a follow up visit for IBS/SIBO.     HPI  68 yo f presents today for follow up visit for IBS/SIBO. She is a patient of Dr. Zavala. She was last seen in the office on 1/17/2018. She has hx SIBO/IBS and has done well in the past with Xifaxan. She was given 1 round of xifaxan and admits since taking it and starting a new all natural probiotic and fiber supplement she is much better. She denies any issues with her bowels now. She has hx of microscopic colitis but has not had any recent flares. She denies any reflux, dysphagia, abd pain, N&V, constipation, diarrhea, rectal bleeding or melena. She does admit she hates she cannot leave the house in the morning without having 1 episode of fecal urgency and BM right after her coffee and she eats breakfast. She is afraid to go without the coffee though because then she fears she will be constipated. She admits her appetite is good and her weight has been stable.     Past Medical History:   Diagnosis Date   • Bloating    • Depression    • Diverticulitis of colon 1/28/2016    Impression: 11/18/2015 - She had diverticulitis last year.  I think this is a mild flare compared to where she was last year.  Will go ahead and put her on Augmentin for 10 days.  Her WBC is normal now.  Impression: 04/10/2014 - Because of her blood in lucy stool, I am ordering the stat CT.  Will treat as outpt as long as there is not perf seen on CT.  I have put in Augmentin for TX so she doesn't have to use Flagyl.;    • Fibromyalgia     She had been folllowed by Dr. Heineke but he has retired.  She has been unable to find another FM specialist in the area.  She tried seeing Dr. Cordova, but this was a pain clinic and he pushed meds on her that she didn't want to take. She is not interested in narcotics for pain control.  I have agreed to continue her on her  current meds and to fill out her disability forms when they come due.  H   • Gallstones    • Hyperlipidemia    • Leukocytosis    • Malignant melanoma of skin    • Non-specific colitis 1/28/2016    Impression: 12/07/2015 - CT from [facility] reviewed.  There is some generalized edematous wall thickening in lucy entire rectosigmoid colon and some in descending colon.  She is tolerating the Augmentin at this point.  I would like for her to see a GI doctor to try to determine what kind of colitis we are dealing with.  She is free to advance her diet as tolerated and I think this will actually help bulk up her stools.  Her WBC has been normal through all of this.;    • Osteopenia 04/13/2015    She will due for dexa next year.[id number] Appointment   • Polyp of sigmoid colon     Due for colonoscopy.  She will contact Dr. Harry Atwood's office to set this up.   • Tubular adenoma of colon 07/31/2014    c-scope Dr. Ornelas - repeat scope 5  years       Past Surgical History:   Procedure Laterality Date   • CHOLECYSTECTOMY     • COLONOSCOPY  01/12/2016    Erythematous mucosa in the entire examined colon, diverticulosis in the sigmoid colon, non-bleeding internal hemorrhoids   • MALIGNANT SKIN LESION EXCISION  2006    legs; melanoma   • TUBAL ABDOMINAL LIGATION         Scheduled Meds:     Continuous Infusions:     PRN Meds:.•  levalbuterol    Allergies   Allergen Reactions   • Tetracycline Myalgia   • Tetracyclines & Related        Social History     Social History   • Marital status:      Spouse name: N/A   • Number of children: N/A   • Years of education: N/A     Occupational History   • Not on file.     Social History Main Topics   • Smoking status: Never Smoker   • Smokeless tobacco: Never Used   • Alcohol use Yes      Comment: social drinker   • Drug use: No   • Sexual activity: Not on file     Other Topics Concern   • Not on file     Social History Narrative   • No narrative on file       Family History   Problem  Relation Age of Onset   • Cancer Mother      lung   • Coronary artery disease Father    • Glaucoma Father    • Lung cancer Father    • Cancer Father      malignant neoplasm of trachea, bronchus and lung   • Stroke Father        Review of Systems   Constitutional: Negative for appetite change, chills, diaphoresis, fatigue, fever and unexpected weight change.   HENT: Negative for nosebleeds, postnasal drip, sore throat, trouble swallowing and voice change.    Respiratory: Negative for cough, choking, chest tightness, shortness of breath and wheezing.    Cardiovascular: Negative for chest pain.   Gastrointestinal: Negative for abdominal distention, abdominal pain, anal bleeding, blood in stool, constipation, diarrhea, nausea, rectal pain and vomiting.   Endocrine: Negative for polydipsia, polyphagia and polyuria.   Musculoskeletal: Negative for gait problem.   Skin: Negative for rash and wound.   Allergic/Immunologic: Negative for food allergies.   Neurological: Negative for dizziness, speech difficulty and light-headedness.   Psychiatric/Behavioral: Negative for confusion, self-injury, sleep disturbance and suicidal ideas.       Vitals:    04/16/18 1428   BP: 118/78   Temp: 97.7 °F (36.5 °C)       Physical Exam   Constitutional: She is oriented to person, place, and time. She appears well-developed and well-nourished. She does not appear ill. No distress.   HENT:   Head: Normocephalic.   Eyes: Pupils are equal, round, and reactive to light.   Cardiovascular: Normal rate, regular rhythm and normal heart sounds.    Pulmonary/Chest: Effort normal and breath sounds normal.   Abdominal: Soft. Bowel sounds are normal. She exhibits no distension and no mass. There is no hepatosplenomegaly. There is no tenderness. There is no rebound and no guarding. No hernia.   Musculoskeletal: Normal range of motion.   Neurological: She is alert and oriented to person, place, and time.   Skin: Skin is warm and dry.   Psychiatric: She has a  normal mood and affect. Her speech is normal and behavior is normal. Judgment normal.       No images are attached to the encounter.    Assessment & Plan    1. Irritable bowel syndrome with both constipation and diarrhea    2. Small intestinal bacterial overgrowth    3. Abdominal bloating    4. Microscopic colitis, unspecified microscopic colitis type    Patient seems to be doing really well at this time. Continue the fiber and probiotics. Follow up with Dr. Zavala in 6 months. Call office with any issues.

## 2018-05-22 DIAGNOSIS — E78.2 MIXED HYPERLIPIDEMIA: ICD-10-CM

## 2018-05-22 DIAGNOSIS — E55.9 VITAMIN D DEFICIENCY: ICD-10-CM

## 2018-05-22 DIAGNOSIS — Z00.00 HEALTH CARE MAINTENANCE: Primary | ICD-10-CM

## 2018-05-22 DIAGNOSIS — E03.9 ACQUIRED HYPOTHYROIDISM: ICD-10-CM

## 2018-05-23 LAB
25(OH)D3+25(OH)D2 SERPL-MCNC: 35.7 NG/ML (ref 30–100)
ALBUMIN SERPL-MCNC: 4.3 G/DL (ref 3.5–5.2)
ALBUMIN/GLOB SERPL: 2 G/DL
ALP SERPL-CCNC: 50 U/L (ref 39–117)
ALT SERPL-CCNC: 17 U/L (ref 1–33)
APPEARANCE UR: CLEAR
AST SERPL-CCNC: 12 U/L (ref 1–32)
BACTERIA #/AREA URNS HPF: NORMAL /HPF
BASOPHILS # BLD AUTO: 0.05 10*3/MM3 (ref 0–0.2)
BASOPHILS NFR BLD AUTO: 0.6 % (ref 0–1.5)
BILIRUB SERPL-MCNC: 0.2 MG/DL (ref 0.1–1.2)
BILIRUB UR QL STRIP: NEGATIVE
BUN SERPL-MCNC: 16 MG/DL (ref 8–23)
BUN/CREAT SERPL: 18.4 (ref 7–25)
CALCIUM SERPL-MCNC: 9.4 MG/DL (ref 8.6–10.5)
CHLORIDE SERPL-SCNC: 101 MMOL/L (ref 98–107)
CHOLEST SERPL-MCNC: 329 MG/DL (ref 0–200)
CO2 SERPL-SCNC: 27.8 MMOL/L (ref 22–29)
COLOR UR: YELLOW
CREAT SERPL-MCNC: 0.87 MG/DL (ref 0.57–1)
EOSINOPHIL # BLD AUTO: 0.3 10*3/MM3 (ref 0–0.7)
EOSINOPHIL NFR BLD AUTO: 3.8 % (ref 0.3–6.2)
EPI CELLS #/AREA URNS HPF: NORMAL /HPF
ERYTHROCYTE [DISTWIDTH] IN BLOOD BY AUTOMATED COUNT: 13.5 % (ref 11.7–13)
GFR SERPLBLD CREATININE-BSD FMLA CKD-EPI: 65 ML/MIN/1.73
GFR SERPLBLD CREATININE-BSD FMLA CKD-EPI: 79 ML/MIN/1.73
GLOBULIN SER CALC-MCNC: 2.2 GM/DL
GLUCOSE SERPL-MCNC: 108 MG/DL (ref 65–99)
GLUCOSE UR QL: NEGATIVE
HCT VFR BLD AUTO: 46.6 % (ref 35.6–45.5)
HDLC SERPL-MCNC: 73 MG/DL (ref 40–60)
HGB BLD-MCNC: 15 G/DL (ref 11.9–15.5)
HGB UR QL STRIP: NEGATIVE
IMM GRANULOCYTES # BLD: 0.07 10*3/MM3 (ref 0–0.03)
IMM GRANULOCYTES NFR BLD: 0.9 % (ref 0–0.5)
KETONES UR QL STRIP: NEGATIVE
LDLC SERPL CALC-MCNC: 216 MG/DL (ref 0–100)
LEUKOCYTE ESTERASE UR QL STRIP: NEGATIVE
LYMPHOCYTES # BLD AUTO: 2.12 10*3/MM3 (ref 0.9–4.8)
LYMPHOCYTES NFR BLD AUTO: 26.9 % (ref 19.6–45.3)
MCH RBC QN AUTO: 29.9 PG (ref 26.9–32)
MCHC RBC AUTO-ENTMCNC: 32.2 G/DL (ref 32.4–36.3)
MCV RBC AUTO: 92.8 FL (ref 80.5–98.2)
MICRO URNS: NORMAL
MICRO URNS: NORMAL
MONOCYTES # BLD AUTO: 0.45 10*3/MM3 (ref 0.2–1.2)
MONOCYTES NFR BLD AUTO: 5.7 % (ref 5–12)
NEUTROPHILS # BLD AUTO: 4.95 10*3/MM3 (ref 1.9–8.1)
NEUTROPHILS NFR BLD AUTO: 63 % (ref 42.7–76)
NITRITE UR QL STRIP: NEGATIVE
PH UR STRIP: 7 [PH] (ref 5–7.5)
PLATELET # BLD AUTO: 284 10*3/MM3 (ref 140–500)
POTASSIUM SERPL-SCNC: 4.4 MMOL/L (ref 3.5–5.2)
PROT SERPL-MCNC: 6.5 G/DL (ref 6–8.5)
PROT UR QL STRIP: NEGATIVE
RBC # BLD AUTO: 5.02 10*6/MM3 (ref 3.9–5.2)
RBC #/AREA URNS HPF: NORMAL /HPF
SODIUM SERPL-SCNC: 142 MMOL/L (ref 136–145)
SP GR UR: 1.01 (ref 1–1.03)
TRIGL SERPL-MCNC: 201 MG/DL (ref 0–150)
TSH SERPL DL<=0.005 MIU/L-ACNC: 3.18 MIU/ML (ref 0.27–4.2)
URINALYSIS REFLEX: NORMAL
UROBILINOGEN UR STRIP-MCNC: 0.2 MG/DL (ref 0.2–1)
VLDLC SERPL CALC-MCNC: 40.2 MG/DL (ref 5–40)
WBC # BLD AUTO: 7.87 10*3/MM3 (ref 4.5–10.7)
WBC #/AREA URNS HPF: NORMAL /HPF

## 2018-05-24 ENCOUNTER — TELEPHONE (OUTPATIENT)
Dept: INTERNAL MEDICINE | Facility: CLINIC | Age: 68
End: 2018-05-24

## 2018-05-24 RX ORDER — LEVOTHYROXINE SODIUM 0.07 MG/1
75 TABLET ORAL DAILY
Qty: 30 TABLET | Refills: 6 | Status: SHIPPED | OUTPATIENT
Start: 2018-05-24 | End: 2018-12-10 | Stop reason: SDUPTHER

## 2018-05-24 NOTE — TELEPHONE ENCOUNTER
Pt called and stated that she does not have an f/u apt for her labs for a couple weeks, stated that she has not felt very well and is not sleeping well, thinks it could be related to her tsh since it has gone up. Wanted to know if the dosage of her synthroid should be changed and see how she feels by her apt time.

## 2018-05-31 RX ORDER — BUPROPION HYDROCHLORIDE 300 MG/1
TABLET ORAL
Qty: 30 TABLET | Refills: 5 | Status: SHIPPED | OUTPATIENT
Start: 2018-05-31 | End: 2018-09-17 | Stop reason: SDUPTHER

## 2018-06-06 ENCOUNTER — OFFICE VISIT (OUTPATIENT)
Dept: INTERNAL MEDICINE | Facility: CLINIC | Age: 68
End: 2018-06-06

## 2018-06-06 VITALS
BODY MASS INDEX: 25.58 KG/M2 | DIASTOLIC BLOOD PRESSURE: 78 MMHG | RESPIRATION RATE: 18 BRPM | HEIGHT: 62 IN | SYSTOLIC BLOOD PRESSURE: 126 MMHG | WEIGHT: 139 LBS | HEART RATE: 70 BPM | OXYGEN SATURATION: 98 %

## 2018-06-06 DIAGNOSIS — M54.2 NECK PAIN: ICD-10-CM

## 2018-06-06 DIAGNOSIS — E78.2 MIXED HYPERLIPIDEMIA: ICD-10-CM

## 2018-06-06 DIAGNOSIS — F51.01 PRIMARY INSOMNIA: Primary | ICD-10-CM

## 2018-06-06 DIAGNOSIS — R53.83 OTHER FATIGUE: ICD-10-CM

## 2018-06-06 DIAGNOSIS — E03.9 ACQUIRED HYPOTHYROIDISM: ICD-10-CM

## 2018-06-06 PROCEDURE — 99214 OFFICE O/P EST MOD 30 MIN: CPT | Performed by: INTERNAL MEDICINE

## 2018-06-06 RX ORDER — CELECOXIB 200 MG/1
200 CAPSULE ORAL DAILY
Qty: 30 CAPSULE | Refills: 6 | Status: SHIPPED | OUTPATIENT
Start: 2018-06-06 | End: 2018-07-02

## 2018-06-06 RX ORDER — CYCLOBENZAPRINE HCL 10 MG
10 TABLET ORAL 3 TIMES DAILY PRN
Qty: 30 TABLET | Refills: 4 | Status: SHIPPED | OUTPATIENT
Start: 2018-06-06 | End: 2018-07-02

## 2018-06-06 NOTE — PROGRESS NOTES
Chief Complaint  Quyen Jalloh is a 68 y.o. female who presents for Insomnia (Couldn't afford her restoril, not taking it anymore. Having insomnia issues. ) and Thyroid Problem  .    History of Present Illness   Quyen is here for routine follow up on Insomnia and hypothyroid and HLD.  She hasn't slept well for months.  She will get 2-3 hours in a row just before morning but the rest of the night she is awake every 20 minutes.  She stopped the temazepam due to cost and also she was quite groggy.  She feels like she has gained weight because she isn't sleeping.  We increased her levothyroxine a few weeks ago and she thinks that has helped.  She also has a lot of neck pain.  She is worried about how high her LDL has gotten.  She tried lipitor and crestor in the past but they just tore her muscles up.  She has never tried Livalo.  She has always been reluctant to try anything else after the first two statins.  She is willing to give livalo a try.  No cp or palp.      Review of Systems   Constitution: Positive for malaise/fatigue.   Cardiovascular: Negative for chest pain, dyspnea on exertion, leg swelling and palpitations.   Musculoskeletal: Positive for myalgias (FM) and neck pain.   Psychiatric/Behavioral: The patient has insomnia and is nervous/anxious.        Patient Active Problem List   Diagnosis   • Adrenal cortical adenoma   • Decreased hearing   • Fatigue   • Hyperlipidemia   • Hypothyroidism   • Insomnia   • Seasonal allergic rhinitis   • Clicking shoulder   • Shoulder pain   • Vitamin D deficiency   • Osteopenia   • Fibromyalgia   • Neck pain       Past Medical History:   Diagnosis Date   • Bloating    • Depression    • Diverticulitis of colon 1/28/2016    Impression: 11/18/2015 - She had diverticulitis last year.  I think this is a mild flare compared to where she was last year.  Will go ahead and put her on Augmentin for 10 days.  Her WBC is normal now.  Impression: 04/10/2014 - Because of her blood in  lucy stool, I am ordering the stat CT.  Will treat as outpt as long as there is not perf seen on CT.  I have put in Augmentin for TX so she doesn't have to use Flagyl.;    • Fibromyalgia     She had been folllowed by Dr. Heineke but he has retired.  She has been unable to find another FM specialist in the area.  She tried seeing Dr. Cordova, but this was a pain clinic and he pushed meds on her that she didn't want to take. She is not interested in narcotics for pain control.  I have agreed to continue her on her current meds and to fill out her disability forms when they come due.  H   • Gallstones    • Hyperlipidemia    • Leukocytosis    • Malignant melanoma of skin    • Non-specific colitis 1/28/2016    Impression: 12/07/2015 - CT from [facility] reviewed.  There is some generalized edematous wall thickening in lucy entire rectosigmoid colon and some in descending colon.  She is tolerating the Augmentin at this point.  I would like for her to see a GI doctor to try to determine what kind of colitis we are dealing with.  She is free to advance her diet as tolerated and I think this will actually help bulk up her stools.  Her WBC has been normal through all of this.;    • Osteopenia 04/13/2015    She will due for dexa next [id number] Appointment   • Polyp of sigmoid colon     Due for colonoscopy.  She will contact Dr. Harry Atwood's office to set this up.   • Tubular adenoma of colon 07/31/2014    c-scope Dr. Ornelas - repeat scope 5  years       Past Surgical History:   Procedure Laterality Date   • CHOLECYSTECTOMY     • COLONOSCOPY  01/12/2016    Erythematous mucosa in the entire examined colon, diverticulosis in the sigmoid colon, non-bleeding internal hemorrhoids   • MALIGNANT SKIN LESION EXCISION  2006    legs; melanoma   • TUBAL ABDOMINAL LIGATION         Family History   Problem Relation Age of Onset   • Cancer Mother         lung   • Coronary artery disease Father    • Glaucoma Father    • Lung cancer Father     • Cancer Father         malignant neoplasm of trachea, bronchus and lung   • Stroke Father        Social History     Social History   • Marital status:      Spouse name: N/A   • Number of children: N/A   • Years of education: N/A     Occupational History   • Not on file.     Social History Main Topics   • Smoking status: Never Smoker   • Smokeless tobacco: Never Used   • Alcohol use Yes      Comment: social drinker   • Drug use: No   • Sexual activity: Not on file     Other Topics Concern   • Not on file     Social History Narrative   • No narrative on file       Current Outpatient Prescriptions on File Prior to Visit   Medication Sig Dispense Refill   • B Complex Vitamins (VITAMIN B COMPLEX) capsule capsule Take  by mouth Daily.     • buPROPion XL (WELLBUTRIN XL) 300 MG 24 hr tablet TAKE 1 TABLET BY MOUTH EVERY DAY 30 tablet 5   • CALCIUM PO Take by mouth.     • Cholecalciferol (VITAMIN D-3) 1000 UNITS capsule Take by mouth. Take as directed     • levothyroxine (SYNTHROID, LEVOTHROID) 75 MCG tablet Take 1 tablet by mouth Daily. 30 tablet 6   • LOTEMAX 0.5 % ophthalmic suspension INSTILL 1 DROP 3 TIMES A DAY INTO BOTH EYES  0   • Magnesium 100 MG capsule Take  by mouth.     • melatonin 5 MG tablet tablet Take by mouth. Take as directed     • Probiotic Product (PROBIOTIC PO) Take  by mouth.     • Pyridoxine HCl (VITAMIN B-6 PO) Take by mouth.     • [DISCONTINUED] albuterol (PROVENTIL HFA;VENTOLIN HFA) 108 (90 Base) MCG/ACT inhaler Inhale 2 puffs Every 4 (Four) Hours As Needed for Wheezing. 1 inhaler 4   • [DISCONTINUED] guaifenesin-codeine (GUAIFENESIN AC) 100-10 MG/5ML liquid Take 5 mL by mouth 3 (Three) Times a Day As Needed for Cough. 125 mL 1   • temazepam (RESTORIL) 7.5 MG capsule TAKE 1 CAPSULE BY MOUTH EVERY NIGHT AS NEEDED 30 capsule 2     Current Facility-Administered Medications on File Prior to Visit   Medication Dose Route Frequency Provider Last Rate Last Dose   • levalbuterol (XOPENEX)  "nebulizer solution 0.63 mg  0.63 mg Nebulization Q6H PRN Monica Lopez MD   0.63 mg at 02/09/18 1704       Allergies   Allergen Reactions   • Tetracycline Myalgia   • Tetracyclines & Related        Vitals:    06/06/18 1041   BP: 126/78   Pulse: 70   Resp: 18   SpO2: 98%   Weight: 63 kg (139 lb)   Height: 157.5 cm (62.01\")       Body mass index is 25.42 kg/m².    Objective   Physical Exam   Constitutional: She is oriented to person, place, and time. She appears well-developed and well-nourished. No distress.   HENT:   Head: Normocephalic and atraumatic.   Eyes: Conjunctivae are normal. No scleral icterus.   Neck: Normal range of motion. Neck supple.   Cardiovascular: Normal rate, regular rhythm and normal heart sounds.  Exam reveals no gallop and no friction rub.    No murmur heard.  Pulmonary/Chest: Effort normal and breath sounds normal. No respiratory distress. She has no wheezes. She has no rales.   Musculoskeletal: She exhibits no edema.   Lymphadenopathy:     She has no cervical adenopathy.   Neurological: She is alert and oriented to person, place, and time. No cranial nerve deficit.   Psychiatric: She has a normal mood and affect. Her behavior is normal. Judgment and thought content normal.       Results for orders placed or performed in visit on 05/22/18   Comprehensive Metabolic Panel   Result Value Ref Range    Glucose 108 (H) 65 - 99 mg/dL    BUN 16 8 - 23 mg/dL    Creatinine 0.87 0.57 - 1.00 mg/dL    eGFR Non African Am 65 >60 mL/min/1.73    eGFR African Am 79 >60 mL/min/1.73    BUN/Creatinine Ratio 18.4 7.0 - 25.0    Sodium 142 136 - 145 mmol/L    Potassium 4.4 3.5 - 5.2 mmol/L    Chloride 101 98 - 107 mmol/L    Total CO2 27.8 22.0 - 29.0 mmol/L    Calcium 9.4 8.6 - 10.5 mg/dL    Total Protein 6.5 6.0 - 8.5 g/dL    Albumin 4.30 3.50 - 5.20 g/dL    Globulin 2.2 gm/dL    A/G Ratio 2.0 g/dL    Total Bilirubin 0.2 0.1 - 1.2 mg/dL    Alkaline Phosphatase 50 39 - 117 U/L    AST (SGOT) 12 1 - 32 U/L    ALT " (SGPT) 17 1 - 33 U/L   Lipid Panel   Result Value Ref Range    Total Cholesterol 329 (H) 0 - 200 mg/dL    Triglycerides 201 (H) 0 - 150 mg/dL    HDL Cholesterol 73 (H) 40 - 60 mg/dL    VLDL Cholesterol 40.2 (H) 5 - 40 mg/dL    LDL Cholesterol  216 (H) 0 - 100 mg/dL   TSH Rfx On Abnormal To Free T4   Result Value Ref Range    TSH 3.18 0.27 - 4.2 mIU/mL   Urinalysis With / Culture If Indicated - Urine, Clean Catch   Result Value Ref Range    Specific Gravity, UA 1.011 1.005 - 1.030    pH, UA 7.0 5.0 - 7.5    Color, UA Yellow Yellow    Appearance, UA Clear Clear    Leukocytes, UA Negative Negative    Protein Negative Negative/Trace    Glucose, UA Negative Negative    Ketones Negative Negative    Blood, UA Negative Negative    Bilirubin, UA Negative Negative    Urobilinogen, UA 0.2 0.2 - 1.0 mg/dL    Nitrite, UA Negative Negative    Microscopic Examination Comment     MICROSCOPIC EXAMINATION See below:     Urinalysis Reflex Comment    Vitamin D 25 Hydroxy   Result Value Ref Range    25 Hydroxy, Vitamin D 35.7 30.0 - 100.0 ng/ml   Microscopic Examination   Result Value Ref Range    WBC, UA 0-5 0 - 5 /hpf    RBC, UA 0-2 0 - 2 /hpf    Epithelial Cells (non renal) 0-10 0 - 10 /hpf    Bacteria, UA None seen None seen/Few /hpf   CBC & Differential   Result Value Ref Range    WBC 7.87 4.50 - 10.70 10*3/mm3    RBC 5.02 3.90 - 5.20 10*6/mm3    Hemoglobin 15.0 11.9 - 15.5 g/dL    Hematocrit 46.6 (H) 35.6 - 45.5 %    MCV 92.8 80.5 - 98.2 fL    MCH 29.9 26.9 - 32.0 pg    MCHC 32.2 (L) 32.4 - 36.3 g/dL    RDW 13.5 (H) 11.7 - 13.0 %    Platelets 284 140 - 500 10*3/mm3    Neutrophil Rel % 63.0 42.7 - 76.0 %    Lymphocyte Rel % 26.9 19.6 - 45.3 %    Monocyte Rel % 5.7 5.0 - 12.0 %    Eosinophil Rel % 3.8 0.3 - 6.2 %    Basophil Rel % 0.6 0.0 - 1.5 %    Neutrophils Absolute 4.95 1.90 - 8.10 10*3/mm3    Lymphocytes Absolute 2.12 0.90 - 4.80 10*3/mm3    Monocytes Absolute 0.45 0.20 - 1.20 10*3/mm3    Eosinophils Absolute 0.30 0.00 - 0.70  10*3/mm3    Basophils Absolute 0.05 0.00 - 0.20 10*3/mm3    Immature Granulocyte Rel % 0.9 (H) 0.0 - 0.5 %    Immature Grans Absolute 0.07 (H) 0.00 - 0.03 10*3/mm3       Assessment/Plan   Diagnoses and all orders for this visit:    Primary insomnia    Neck pain  -     celecoxib (CELEBREX) 200 MG capsule; Take 1 capsule by mouth Daily.  -     cyclobenzaprine (FLEXERIL) 10 MG tablet; Take 1 tablet by mouth 3 (Three) Times a Day As Needed for Muscle Spasms.    Other fatigue  -     Comprehensive Metabolic Panel; Future  -     Lipid Panel; Future    Acquired hypothyroidism  -     TSH; Future    Mixed hyperlipidemia  -     Comprehensive Metabolic Panel; Future  -     Lipid Panel; Future        Discussion/Summary  Quyen is here for follow up.   We are going to try Livalo 2 mg daily to see if she can tolerate this.  I have given her six weeks of samples.  Her fatigue is better since we increased her thyroid dose a few weeks back.  We are going to try flexeril for her neck pain and see if this generally helps with her myalgias from FM. Advised to take at bedtime.  She has an annual exam scheduled in July.  We will follow up with labs then.    No Follow-up on file.

## 2018-06-28 DIAGNOSIS — E78.2 MIXED HYPERLIPIDEMIA: ICD-10-CM

## 2018-06-28 DIAGNOSIS — E03.9 ACQUIRED HYPOTHYROIDISM: ICD-10-CM

## 2018-06-28 DIAGNOSIS — R53.83 OTHER FATIGUE: ICD-10-CM

## 2018-06-29 LAB
ALBUMIN SERPL-MCNC: 4.5 G/DL (ref 3.5–5.2)
ALBUMIN/GLOB SERPL: 2.3 G/DL
ALP SERPL-CCNC: 54 U/L (ref 39–117)
ALT SERPL-CCNC: 20 U/L (ref 1–33)
AST SERPL-CCNC: 11 U/L (ref 1–32)
BILIRUB SERPL-MCNC: 0.2 MG/DL (ref 0.1–1.2)
BUN SERPL-MCNC: 14 MG/DL (ref 8–23)
BUN/CREAT SERPL: 16.3 (ref 7–25)
CALCIUM SERPL-MCNC: 9.7 MG/DL (ref 8.6–10.5)
CHLORIDE SERPL-SCNC: 99 MMOL/L (ref 98–107)
CHOLEST SERPL-MCNC: 226 MG/DL (ref 0–200)
CO2 SERPL-SCNC: 29.8 MMOL/L (ref 22–29)
CREAT SERPL-MCNC: 0.86 MG/DL (ref 0.57–1)
GLOBULIN SER CALC-MCNC: 2 GM/DL
GLUCOSE SERPL-MCNC: 101 MG/DL (ref 65–99)
HDLC SERPL-MCNC: 72 MG/DL (ref 40–60)
LDLC SERPL CALC-MCNC: 121 MG/DL (ref 0–100)
POTASSIUM SERPL-SCNC: 4.4 MMOL/L (ref 3.5–5.2)
PROT SERPL-MCNC: 6.5 G/DL (ref 6–8.5)
SODIUM SERPL-SCNC: 140 MMOL/L (ref 136–145)
TRIGL SERPL-MCNC: 166 MG/DL (ref 0–150)
TSH SERPL DL<=0.005 MIU/L-ACNC: 1.29 MIU/ML (ref 0.27–4.2)
VLDLC SERPL CALC-MCNC: 33.2 MG/DL (ref 5–40)

## 2018-07-02 ENCOUNTER — OFFICE VISIT (OUTPATIENT)
Dept: INTERNAL MEDICINE | Facility: CLINIC | Age: 68
End: 2018-07-02

## 2018-07-02 VITALS
HEART RATE: 81 BPM | BODY MASS INDEX: 25.76 KG/M2 | HEIGHT: 62 IN | OXYGEN SATURATION: 97 % | WEIGHT: 140 LBS | DIASTOLIC BLOOD PRESSURE: 76 MMHG | RESPIRATION RATE: 18 BRPM | SYSTOLIC BLOOD PRESSURE: 120 MMHG

## 2018-07-02 DIAGNOSIS — Z00.00 MEDICARE ANNUAL WELLNESS VISIT, SUBSEQUENT: Primary | ICD-10-CM

## 2018-07-02 DIAGNOSIS — F51.01 PRIMARY INSOMNIA: ICD-10-CM

## 2018-07-02 DIAGNOSIS — E03.9 ACQUIRED HYPOTHYROIDISM: ICD-10-CM

## 2018-07-02 DIAGNOSIS — M79.7 FIBROMYALGIA: ICD-10-CM

## 2018-07-02 DIAGNOSIS — Z23 NEED FOR PNEUMOCOCCAL VACCINATION: ICD-10-CM

## 2018-07-02 DIAGNOSIS — E78.2 MIXED HYPERLIPIDEMIA: ICD-10-CM

## 2018-07-02 PROCEDURE — 99214 OFFICE O/P EST MOD 30 MIN: CPT | Performed by: INTERNAL MEDICINE

## 2018-07-02 PROCEDURE — G0009 ADMIN PNEUMOCOCCAL VACCINE: HCPCS | Performed by: INTERNAL MEDICINE

## 2018-07-02 PROCEDURE — 90732 PPSV23 VACC 2 YRS+ SUBQ/IM: CPT | Performed by: INTERNAL MEDICINE

## 2018-07-02 PROCEDURE — G0439 PPPS, SUBSEQ VISIT: HCPCS | Performed by: INTERNAL MEDICINE

## 2018-07-02 RX ORDER — TRAZODONE HYDROCHLORIDE 50 MG/1
50 TABLET ORAL NIGHTLY
Qty: 30 TABLET | Refills: 3 | Status: SHIPPED | OUTPATIENT
Start: 2018-07-02 | End: 2018-09-17 | Stop reason: SDUPTHER

## 2018-07-02 RX ORDER — PRAVASTATIN SODIUM 10 MG
10 TABLET ORAL DAILY
Qty: 30 TABLET | Refills: 6 | Status: SHIPPED | OUTPATIENT
Start: 2018-07-02 | End: 2018-12-10 | Stop reason: SDUPTHER

## 2018-07-02 RX ORDER — MELOXICAM 7.5 MG/1
7.5 TABLET ORAL DAILY
Qty: 30 TABLET | Refills: 3 | Status: SHIPPED | OUTPATIENT
Start: 2018-07-02 | End: 2018-09-20

## 2018-07-02 NOTE — PATIENT INSTRUCTIONS
Medicare Wellness  Personal Prevention Plan of Service     Date of Office Visit:  2018  Encounter Provider:  Veronica Dunaway MD  Place of Service:  [facility] INTERNAL MEDICINE  Patient Name: Quyen Jalloh  :  1950    As part of the Medicare Wellness portion of your visit today, we are providing you with this personalized preventive plan of services (PPPS). This plan is based upon recommendations of the [place] Preventive Services Task Force (USPSTF) and the Advisory Committee on Immunization Practices (ACIP).    This lists the preventive care services that should be considered, and provides dates of when you are due. Items listed as completed are up-to-date and do not require any further intervention.    Health Maintenance   Topic Date Due   • ZOSTER VACCINE (2 of 2) 2016   • PNEUMOCOCCAL VACCINES (65+ LOW/MEDIUM RISK) (2 of 2 - PPSV23) 2017   • MAMMOGRAM  2018   • MEDICARE ANNUAL WELLNESS  2018   • INFLUENZA VACCINE  2018   • DXA SCAN  11/15/2018   • LIPID PANEL  2019   • COLONOSCOPY  2021   • TDAP/TD VACCINES (2 - Td) 2025   • HEPATITIS C SCREENING  Completed       No orders of the defined types were placed in this encounter.      No Follow-up on file.

## 2018-07-02 NOTE — PROGRESS NOTES
Medicare Annual Wellness Visit    Chief Complaint:  Annual Exam (SUB AWV); Hyperlipidemia; Hypothyroidism; and Insomnia      History of Present Illness    Quyen Jalloh is a 68 y.o. female who presents for an Annual Wellness Visit. In addition, we addressed the following health issues:    Mammo: 5/1/2017, one is scheduled for this year   Pap: N/A  DEXA: 11/15/2016  C-scope: 01/12/2016  Prevnar: 09/27/2016  Pneumovax:not yet  Dental Exam: Every 6 months  Eye Exam: Yearly  Exercise: Walks a few days a week, but not if it is too hot out.   Advanced Care Planning:  has an advance directive - a copy has been provided and is in file   Immunization History   Administered Date(s) Administered   • Hepatitis A 05/11/2018   • Pneumococcal Conjugate 13-Valent (PCV13) 09/27/2016   • Pneumococcal Polysaccharide (PPSV23) 07/02/2018     Quyen is here for follow up on her hypothyroid, HLD, insomnia.  She feels much less fatigued now that her thyroid is well controlled.  She is convinced that her cortisol level is through the roof because of her weight gain and feeling hot at night.  She thinks this is contributing to her lack of sleep and that her lack of sleep in turn is elevating her cortisol.  We discussed that her labs do not point toward a cortisol abnormality.  Nor does her bp.   She has tolerated the Livalo, but it's too expensive.  Celebrex was too expensive as well.  She would like to try alternatives to these.  .      Review of Systems   Constitution: Negative for chills, fever, malaise/fatigue and night sweats (she just feels hot at night).   HENT: Positive for hearing loss (hearing aids - new). Negative for hoarse voice and sore throat.    Eyes: Negative for blurred vision, double vision and visual disturbance.   Cardiovascular: Negative for chest pain, dyspnea on exertion, leg swelling and palpitations.   Respiratory: Negative for cough and shortness of breath.    Endocrine: Negative for polydipsia and polyuria.    Hematologic/Lymphatic: Does not bruise/bleed easily.   Skin: Negative for rash and suspicious lesions.   Musculoskeletal: Positive for arthritis, joint pain, myalgias and neck pain.   Gastrointestinal: Negative for bloating, abdominal pain, change in bowel habit, constipation, diarrhea, dysphagia, hematochezia, melena, nausea and vomiting.   Genitourinary: Negative for dysuria and hematuria.   Neurological: Negative for dizziness, headaches and light-headedness.   Psychiatric/Behavioral: Negative for depression. The patient has insomnia. The patient is not nervous/anxious.        Past Medical History:   Diagnosis Date   • Bloating    • Depression    • Diverticulitis of colon 1/28/2016    Impression: 11/18/2015 - She had diverticulitis last year.  I think this is a mild flare compared to where she was last year.  Will go ahead and put her on Augmentin for 10 days.  Her WBC is normal now.  Impression: 04/10/2014 - Because of her blood in lucy stool, I am ordering the stat CT.  Will treat as outpt as long as there is not perf seen on CT.  I have put in Augmentin for TX so she doesn't have to use Flagyl.;    • Fibromyalgia     She had been folllowed by Dr. Heineke but he has retired.  She has been unable to find another FM specialist in the area.  She tried seeing Dr. Cordova, but this was a pain clinic and he pushed meds on her that she didn't want to take. She is not interested in narcotics for pain control.  I have agreed to continue her on her current meds and to fill out her disability forms when they come due.  H   • Gallstones    • Hyperlipidemia    • Leukocytosis    • Malignant melanoma of skin (CMS/HCC)    • Non-specific colitis 1/28/2016    Impression: 12/07/2015 - CT from [facility] reviewed.  There is some generalized edematous wall thickening in lucy entire rectosigmoid colon and some in descending colon.  She is tolerating the Augmentin at this point.  I would like for her to see a GI doctor to try to  determine what kind of colitis we are dealing with.  She is free to advance her diet as tolerated and I think this will actually help bulk up her stools.  Her WBC has been normal through all of this.;    • Osteopenia 04/13/2015    She will due for dexa next year.[id number] Appointment   • Polyp of sigmoid colon     Due for colonoscopy.  She will contact Dr. Harry Atwood's office to set this up.   • Tubular adenoma of colon 07/31/2014    c-scope Dr. Ornelas - repeat scope 5  years       Past Surgical History:   Procedure Laterality Date   • CHOLECYSTECTOMY     • COLONOSCOPY  01/12/2016    Erythematous mucosa in the entire examined colon, diverticulosis in the sigmoid colon, non-bleeding internal hemorrhoids   • MALIGNANT SKIN LESION EXCISION  2006    legs; melanoma   • TUBAL ABDOMINAL LIGATION         Social History     Social History   • Marital status:      Spouse name: N/A   • Number of children: N/A   • Years of education: N/A     Occupational History   • Not on file.     Social History Main Topics   • Smoking status: Never Smoker   • Smokeless tobacco: Never Used   • Alcohol use Yes      Comment: social drinker   • Drug use: No   • Sexual activity: Not on file     Other Topics Concern   • Not on file     Social History Narrative   • No narrative on file       Family History   Problem Relation Age of Onset   • Cancer Mother         lung   • Coronary artery disease Father    • Glaucoma Father    • Lung cancer Father    • Cancer Father         malignant neoplasm of trachea, bronchus and lung   • Stroke Father        Allergies   Allergen Reactions   • Tetracycline Myalgia   • Tetracyclines & Related        Current Outpatient Prescriptions on File Prior to Visit   Medication Sig Dispense Refill   • B Complex Vitamins (VITAMIN B COMPLEX) capsule capsule Take  by mouth Daily.     • buPROPion XL (WELLBUTRIN XL) 300 MG 24 hr tablet TAKE 1 TABLET BY MOUTH EVERY DAY 30 tablet 5   • CALCIUM PO Take by mouth.     •  Cholecalciferol (VITAMIN D-3) 1000 UNITS capsule Take by mouth. Take as directed     • levothyroxine (SYNTHROID, LEVOTHROID) 75 MCG tablet Take 1 tablet by mouth Daily. 30 tablet 6   • Magnesium 100 MG capsule Take  by mouth.     • melatonin 5 MG tablet tablet Take by mouth. Take as directed     • Probiotic Product (PROBIOTIC PO) Take  by mouth.     • Pyridoxine HCl (VITAMIN B-6 PO) Take by mouth.     • [DISCONTINUED] celecoxib (CELEBREX) 200 MG capsule Take 1 capsule by mouth Daily. 30 capsule 6   • [DISCONTINUED] cyclobenzaprine (FLEXERIL) 10 MG tablet Take 1 tablet by mouth 3 (Three) Times a Day As Needed for Muscle Spasms. 30 tablet 4   • [DISCONTINUED] LOTEMAX 0.5 % ophthalmic suspension INSTILL 1 DROP 3 TIMES A DAY INTO BOTH EYES  0   • [DISCONTINUED] temazepam (RESTORIL) 7.5 MG capsule TAKE 1 CAPSULE BY MOUTH EVERY NIGHT AS NEEDED 30 capsule 2     Current Facility-Administered Medications on File Prior to Visit   Medication Dose Route Frequency Provider Last Rate Last Dose   • levalbuterol (XOPENEX) nebulizer solution 0.63 mg  0.63 mg Nebulization Q6H PRN Monica Lopez MD   0.63 mg at 02/09/18 1704       Patient Active Problem List   Diagnosis   • Adrenal cortical adenoma   • Decreased hearing   • Fatigue   • Hyperlipidemia   • Hypothyroidism   • Insomnia   • Seasonal allergic rhinitis   • Clicking shoulder   • Shoulder pain   • Vitamin D deficiency   • Osteopenia   • Fibromyalgia   • Neck pain       Health Risk Assessment/Depression Screen/Functional and Cognitive Screening:   The patient has completed a Health Risk Assessment & Depression screen. These have been reviewed with them and have been scanned as a separate documents.    Age-appropriate Screening Schedule:  Refer to the list below for future screening recommendations based on patient's age. Orders for these recommended tests are listed in the plan section. The patient has been provided with a written plan.     I have reviewed their problem list,  "past medical history, family history, social history, and surgical history.     Vitals:    07/02/18 0916   BP: 120/76   Pulse: 81   Resp: 18   SpO2: 97%   Weight: 63.5 kg (140 lb)   Height: 157.5 cm (62.01\")   PainSc: 0-No pain       Body mass index is 25.6 kg/m².    Physical Exam   Constitutional: She is oriented to person, place, and time. She appears well-developed and well-nourished. No distress.   HENT:   Head: Normocephalic and atraumatic.   Nose: Nose normal.   Mouth/Throat: Oropharynx is clear and moist.   Eyes: Conjunctivae and EOM are normal. Pupils are equal, round, and reactive to light. No scleral icterus.   Neck: Normal range of motion. Neck supple. No thyromegaly present.   Cardiovascular: Normal rate, regular rhythm and normal heart sounds.  Exam reveals no gallop and no friction rub.    No murmur heard.  Pulses:       Carotid pulses are 2+ on the right side, and 2+ on the left side.       Femoral pulses are 2+ on the right side, and 2+ on the left side.       Dorsalis pedis pulses are 2+ on the right side, and 2+ on the left side.        Posterior tibial pulses are 2+ on the right side, and 2+ on the left side.   Pulmonary/Chest: Effort normal and breath sounds normal. No respiratory distress. She has no wheezes. She has no rales. Right breast exhibits no mass and no nipple discharge. Left breast exhibits no mass and no nipple discharge.   Abdominal: Soft. Bowel sounds are normal. She exhibits no distension and no mass. There is no tenderness.   Musculoskeletal: Normal range of motion. She exhibits no edema.     Vascular Status -  Her right foot exhibits no edema. Her left foot exhibits no edema.  Skin Integrity  -  Her right foot skin is intact.Her left foot skin is intact..  Lymphadenopathy:     She has no cervical adenopathy.     She has no axillary adenopathy.        Right: No inguinal and no supraclavicular adenopathy present.        Left: No inguinal and no supraclavicular adenopathy present. "   Neurological: She is alert and oriented to person, place, and time. She has normal reflexes. No cranial nerve deficit.   Skin: Skin is warm and dry.   Psychiatric: She has a normal mood and affect. Her speech is normal and behavior is normal. Judgment and thought content normal. Cognition and memory are normal.   Vitals reviewed.      Results for orders placed or performed in visit on 06/28/18   Comprehensive Metabolic Panel   Result Value Ref Range    Glucose 101 (H) 65 - 99 mg/dL    BUN 14 8 - 23 mg/dL    Creatinine 0.86 0.57 - 1.00 mg/dL    eGFR Non African Am 66 >60 mL/min/1.73    eGFR African Am 80 >60 mL/min/1.73    BUN/Creatinine Ratio 16.3 7.0 - 25.0    Sodium 140 136 - 145 mmol/L    Potassium 4.4 3.5 - 5.2 mmol/L    Chloride 99 98 - 107 mmol/L    Total CO2 29.8 (H) 22.0 - 29.0 mmol/L    Calcium 9.7 8.6 - 10.5 mg/dL    Total Protein 6.5 6.0 - 8.5 g/dL    Albumin 4.50 3.50 - 5.20 g/dL    Globulin 2.0 gm/dL    A/G Ratio 2.3 g/dL    Total Bilirubin 0.2 0.1 - 1.2 mg/dL    Alkaline Phosphatase 54 39 - 117 U/L    AST (SGOT) 11 1 - 32 U/L    ALT (SGPT) 20 1 - 33 U/L   TSH   Result Value Ref Range    TSH 1.290 0.270 - 4.200 mIU/mL   Lipid Panel   Result Value Ref Range    Total Cholesterol 226 (H) 0 - 200 mg/dL    Triglycerides 166 (H) 0 - 150 mg/dL    HDL Cholesterol 72 (H) 40 - 60 mg/dL    VLDL Cholesterol 33.2 5 - 40 mg/dL    LDL Cholesterol  121 (H) 0 - 100 mg/dL       Assessment & Plan:    Diagnoses and all orders for this visit:    Medicare annual wellness visit, subsequent    Acquired hypothyroidism    Fibromyalgia    Mixed hyperlipidemia  -     Hepatic Function Panel; Future  -     Lipid Panel; Future    Primary insomnia  -     traZODone (DESYREL) 50 MG tablet; Take 1 tablet by mouth Every Night.    Need for pneumococcal vaccination  -     Pneumococcal Polysaccharide Vaccine 23-Valent Greater Than or Equal To 1yo Subcutaneous / IM    Other orders  -     pravastatin (PRAVACHOL) 10 MG tablet; Take 1 tablet  by mouth Daily.  -     meloxicam (MOBIC) 7.5 MG tablet; Take 1 tablet by mouth Daily.        Discussion/Summary  Quyen is here for her AWV and follow up.  She is up to date on most health maintenance.  She has her mammo scheduled.  Pneumovax today.  She will look into the shingles vaccine.  Her thyroid is well controlled.  We are going to see if she can tolerate pravastatin.  Her lipids looks significantly better on livalo but this is too expensive.  We are going to try trazodone for her sleep which she was on years ago.  I       Follow Up:  Return in about 3 months (around 10/2/2018) for labs only fasting; 1 year AWV with labs.  .     An After Visit Summary and PPPS with all of these plans were given to the patient.

## 2018-08-29 ENCOUNTER — APPOINTMENT (OUTPATIENT)
Dept: WOMENS IMAGING | Facility: HOSPITAL | Age: 68
End: 2018-08-29

## 2018-08-29 PROCEDURE — 77067 SCR MAMMO BI INCL CAD: CPT | Performed by: RADIOLOGY

## 2018-09-17 DIAGNOSIS — F51.01 PRIMARY INSOMNIA: ICD-10-CM

## 2018-09-17 RX ORDER — TRAZODONE HYDROCHLORIDE 50 MG/1
50 TABLET ORAL NIGHTLY
Qty: 90 TABLET | Refills: 1 | Status: SHIPPED | OUTPATIENT
Start: 2018-09-17 | End: 2019-03-14 | Stop reason: SDUPTHER

## 2018-09-17 RX ORDER — BUPROPION HYDROCHLORIDE 300 MG/1
300 TABLET ORAL DAILY
Qty: 90 TABLET | Refills: 1 | Status: SHIPPED | OUTPATIENT
Start: 2018-09-17 | End: 2019-03-14 | Stop reason: SDUPTHER

## 2018-09-20 ENCOUNTER — OFFICE VISIT (OUTPATIENT)
Dept: GASTROENTEROLOGY | Facility: CLINIC | Age: 68
End: 2018-09-20

## 2018-09-20 VITALS
HEIGHT: 62 IN | DIASTOLIC BLOOD PRESSURE: 82 MMHG | SYSTOLIC BLOOD PRESSURE: 132 MMHG | WEIGHT: 135 LBS | BODY MASS INDEX: 24.84 KG/M2 | TEMPERATURE: 98.2 F

## 2018-09-20 DIAGNOSIS — R10.12 LEFT UPPER QUADRANT PAIN: Primary | ICD-10-CM

## 2018-09-20 DIAGNOSIS — R19.4 ALTERED BOWEL HABITS: ICD-10-CM

## 2018-09-20 DIAGNOSIS — R10.32 LLQ PAIN: ICD-10-CM

## 2018-09-20 LAB
BASOPHILS # BLD AUTO: 0.05 10*3/MM3 (ref 0–0.2)
BASOPHILS NFR BLD AUTO: 0.6 % (ref 0–1.5)
EOSINOPHIL # BLD AUTO: 0.23 10*3/MM3 (ref 0–0.7)
EOSINOPHIL NFR BLD AUTO: 2.8 % (ref 0.3–6.2)
ERYTHROCYTE [DISTWIDTH] IN BLOOD BY AUTOMATED COUNT: 13.3 % (ref 11.7–13)
HCT VFR BLD AUTO: 42.9 % (ref 35.6–45.5)
HGB BLD-MCNC: 14.2 G/DL (ref 11.9–15.5)
IMM GRANULOCYTES # BLD: 0.09 10*3/MM3 (ref 0–0.03)
IMM GRANULOCYTES NFR BLD: 1.1 % (ref 0–0.5)
LYMPHOCYTES # BLD AUTO: 2.4 10*3/MM3 (ref 0.9–4.8)
LYMPHOCYTES NFR BLD AUTO: 29.3 % (ref 19.6–45.3)
MCH RBC QN AUTO: 29.9 PG (ref 26.9–32)
MCHC RBC AUTO-ENTMCNC: 33.1 G/DL (ref 32.4–36.3)
MCV RBC AUTO: 90.3 FL (ref 80.5–98.2)
MONOCYTES # BLD AUTO: 0.41 10*3/MM3 (ref 0.2–1.2)
MONOCYTES NFR BLD AUTO: 5 % (ref 5–12)
NEUTROPHILS # BLD AUTO: 5.09 10*3/MM3 (ref 1.9–8.1)
NEUTROPHILS NFR BLD AUTO: 62.3 % (ref 42.7–76)
PLATELET # BLD AUTO: 293 10*3/MM3 (ref 140–500)
RBC # BLD AUTO: 4.75 10*6/MM3 (ref 3.9–5.2)
WBC # BLD AUTO: 8.18 10*3/MM3 (ref 4.5–10.7)

## 2018-09-20 PROCEDURE — 99214 OFFICE O/P EST MOD 30 MIN: CPT | Performed by: INTERNAL MEDICINE

## 2018-09-20 NOTE — PROGRESS NOTES
Chief Complaint   Patient presents with   • Follow-up     IBS       Quyen Jalloh is a  68 y.o. female here for a follow up visit for alternating diarrhea and constipation.  She is not starting to have pain. She takes the magnesium if she needs to have a BM.  She takes metamucil but sometimes this sometimes constipates her.  She has had abdominal pain now for about 6 weeks.  Pain is in lower abdomen.  She has taken some old bentyl she had at times with some improvement.  She has had diverticulitis in the past.  No fever.  Last c/s 2016 with microscopic colitis.  She has lost weight with effort and limiting carbs.  No blood in stool.  HPI  Past Medical History:   Diagnosis Date   • Bloating    • Depression    • Diverticulitis of colon 1/28/2016    Impression: 11/18/2015 - She had diverticulitis last year.  I think this is a mild flare compared to where she was last year.  Will go ahead and put her on Augmentin for 10 days.  Her WBC is normal now.  Impression: 04/10/2014 - Because of her blood in lucy stool, I am ordering the stat CT.  Will treat as outpt as long as there is not perf seen on CT.  I have put in Augmentin for TX so she doesn't have to use Flagyl.;    • Fibromyalgia     She had been folllowed by Dr. Heineke but he has retired.  She has been unable to find another FM specialist in the area.  She tried seeing Dr. Cordova, but this was a pain clinic and he pushed meds on her that she didn't want to take. She is not interested in narcotics for pain control.  I have agreed to continue her on her current meds and to fill out her disability forms when they come due.  H   • Gallstones    • Hyperlipidemia    • Leukocytosis    • Malignant melanoma of skin (CMS/HCC)    • Non-specific colitis 1/28/2016    Impression: 12/07/2015 - CT from [facility] reviewed.  There is some generalized edematous wall thickening in lucy entire rectosigmoid colon and some in descending colon.  She is tolerating the Augmentin at this  point.  I would like for her to see a GI doctor to try to determine what kind of colitis we are dealing with.  She is free to advance her diet as tolerated and I think this will actually help bulk up her stools.  Her WBC has been normal through all of this.;    • Osteopenia 04/13/2015    She will due for dexa next year.[id number] Appointment   • Polyp of sigmoid colon     Due for colonoscopy.  She will contact Dr. Harry Atwood's office to set this up.   • Tubular adenoma of colon 07/31/2014    c-scope Dr. Ornelas - repeat scope 5  years     Past Surgical History:   Procedure Laterality Date   • CHOLECYSTECTOMY     • COLONOSCOPY  01/12/2016    Erythematous mucosa in the entire examined colon, diverticulosis in the sigmoid colon, non-bleeding internal hemorrhoids   • MALIGNANT SKIN LESION EXCISION  2006    legs; melanoma   • TUBAL ABDOMINAL LIGATION         Current Outpatient Prescriptions:   •  B Complex Vitamins (VITAMIN B COMPLEX) capsule capsule, Take  by mouth Daily., Disp: , Rfl:   •  buPROPion XL (WELLBUTRIN XL) 300 MG 24 hr tablet, Take 1 tablet by mouth Daily., Disp: 90 tablet, Rfl: 1  •  CALCIUM PO, Take by mouth., Disp: , Rfl:   •  Cholecalciferol (VITAMIN D-3) 1000 UNITS capsule, Take by mouth. Take as directed, Disp: , Rfl:   •  levothyroxine (SYNTHROID, LEVOTHROID) 75 MCG tablet, Take 1 tablet by mouth Daily., Disp: 30 tablet, Rfl: 6  •  Magnesium 100 MG capsule, Take  by mouth., Disp: , Rfl:   •  melatonin 5 MG tablet tablet, Take by mouth. Take as directed, Disp: , Rfl:   •  pravastatin (PRAVACHOL) 10 MG tablet, Take 1 tablet by mouth Daily., Disp: 30 tablet, Rfl: 6  •  Probiotic Product (PROBIOTIC PO), Take  by mouth., Disp: , Rfl:   •  traZODone (DESYREL) 50 MG tablet, Take 1 tablet by mouth Every Night., Disp: 90 tablet, Rfl: 1  •  ultra-purified peppermint oil (IBGARD) 90 mg capsule capsule, Take  by mouth., Disp: , Rfl:   No current facility-administered medications for this visit.   PRN  Meds:.  Allergies   Allergen Reactions   • Tetracycline Myalgia   • Tetracyclines & Related      Social History     Social History   • Marital status:      Spouse name: N/A   • Number of children: N/A   • Years of education: N/A     Occupational History   • Not on file.     Social History Main Topics   • Smoking status: Never Smoker   • Smokeless tobacco: Never Used   • Alcohol use Yes      Comment: social drinker   • Drug use: No   • Sexual activity: Not on file     Other Topics Concern   • Not on file     Social History Narrative   • No narrative on file     Family History   Problem Relation Age of Onset   • Cancer Mother         lung   • Coronary artery disease Father    • Glaucoma Father    • Lung cancer Father    • Cancer Father         malignant neoplasm of trachea, bronchus and lung   • Stroke Father      Review of Systems   Constitutional: Negative for appetite change and unexpected weight change.   Gastrointestinal: Positive for abdominal pain and constipation. Negative for nausea.   All other systems reviewed and are negative.    Vitals:    09/20/18 1146   BP: 132/82   Temp: 98.2 °F (36.8 °C)     1    09/20/18  1146   Weight: 61.2 kg (135 lb)     Physical Exam   Constitutional: She appears well-developed and well-nourished.   HENT:   Head: Normocephalic and atraumatic.   Eyes: No scleral icterus.   Abdominal: Soft. She exhibits no distension and no mass. There is no tenderness.   Neurological: She is alert.   Skin: Skin is warm and dry.   Psychiatric: She has a normal mood and affect.     No images are attached to the encounter.  Diagnoses and all orders for this visit:    Left upper quadrant pain  -     CBC & Differential  -     CT Abdomen Pelvis With Contrast; Future    Altered bowel habits    LLQ pain    Other orders  -     ultra-purified peppermint oil (IBGARD) 90 mg capsule capsule; Take  by mouth.         Plan:  - cbc today  - given ongoing abdominal pain, will get CT scan  - start low dose  miralax  - ok to use bentyl as needed for abdominal pain  - 4 week f/u

## 2018-09-21 ENCOUNTER — TELEPHONE (OUTPATIENT)
Dept: GASTROENTEROLOGY | Facility: CLINIC | Age: 68
End: 2018-09-21

## 2018-09-25 ENCOUNTER — HOSPITAL ENCOUNTER (OUTPATIENT)
Dept: CT IMAGING | Facility: HOSPITAL | Age: 68
Discharge: HOME | End: 2018-09-25
Attending: INTERNAL MEDICINE | Admitting: INTERNAL MEDICINE

## 2018-09-25 DIAGNOSIS — R10.12 LEFT UPPER QUADRANT PAIN: ICD-10-CM

## 2018-09-25 LAB — CREAT BLDA-MCNC: 0.9 MG/DL (ref 0.6–1.3)

## 2018-09-25 PROCEDURE — 74177 CT ABD & PELVIS W/CONTRAST: CPT

## 2018-09-25 PROCEDURE — 25010000002 IOPAMIDOL 61 % SOLUTION: Performed by: INTERNAL MEDICINE

## 2018-09-25 PROCEDURE — 82565 ASSAY OF CREATININE: CPT

## 2018-09-25 RX ADMIN — IOPAMIDOL 85 ML: 612 INJECTION, SOLUTION INTRAVENOUS at 17:00

## 2018-09-30 ENCOUNTER — RESULTS ENCOUNTER (OUTPATIENT)
Dept: INTERNAL MEDICINE | Facility: CLINIC | Age: 68
End: 2018-09-30

## 2018-09-30 DIAGNOSIS — E78.2 MIXED HYPERLIPIDEMIA: ICD-10-CM

## 2018-10-01 ENCOUNTER — OFFICE VISIT (OUTPATIENT)
Dept: GASTROENTEROLOGY | Facility: CLINIC | Age: 68
End: 2018-10-01

## 2018-10-01 VITALS
SYSTOLIC BLOOD PRESSURE: 146 MMHG | HEIGHT: 61 IN | DIASTOLIC BLOOD PRESSURE: 80 MMHG | WEIGHT: 134.4 LBS | BODY MASS INDEX: 25.37 KG/M2 | TEMPERATURE: 98.1 F

## 2018-10-01 DIAGNOSIS — R19.7 DIARRHEA, UNSPECIFIED TYPE: Primary | ICD-10-CM

## 2018-10-01 DIAGNOSIS — K52.839 MICROSCOPIC COLITIS, UNSPECIFIED MICROSCOPIC COLITIS TYPE: ICD-10-CM

## 2018-10-01 DIAGNOSIS — R10.32 LEFT LOWER QUADRANT PAIN: ICD-10-CM

## 2018-10-01 PROCEDURE — 99213 OFFICE O/P EST LOW 20 MIN: CPT | Performed by: INTERNAL MEDICINE

## 2018-10-01 RX ORDER — DICYCLOMINE HCL 20 MG
20 TABLET ORAL
Qty: 60 TABLET | Refills: 3 | Status: SHIPPED | OUTPATIENT
Start: 2018-10-01 | End: 2019-01-10 | Stop reason: SDUPTHER

## 2018-10-01 NOTE — PROGRESS NOTES
Chief Complaint   Patient presents with   • Follow-up   • Abdominal Pain   • Diarrhea       Quyen Jalloh is a  68 y.o. female here for a follow up visit for left lower quadrant pain, diarrhea and urgency.  Since her last visit she has not really noticed any significant improvement.  We did a CBC at her last visit which was normal and a CT scan which showed diverticulosis without diverticulitis.  No concerning findings were noted.  She's tried MiraLAX for approximately the past week she doesn't really notice any significant improvement.  She reports multiple loose stools daily associated with urgency.  The mornings appear to be the worst.  She does get some relief from Bentyl.  She is concerned because she is going on a trip in 10 days and has several event scheduled in the morning and is concerned about bathroom access at these times.  She does have a history of prior colonoscopy in 2016 notable for microscopic colitis.  At that time she was treated with Entocort.  Her symptoms were much worse than what she is experiencing now.  She did respond to Entocort but it took some time.  HPI  Past Medical History:   Diagnosis Date   • Bloating    • Depression    • Diverticulitis of colon 1/28/2016    Impression: 11/18/2015 - She had diverticulitis last year.  I think this is a mild flare compared to where she was last year.  Will go ahead and put her on Augmentin for 10 days.  Her WBC is normal now.  Impression: 04/10/2014 - Because of her blood in lucy stool, I am ordering the stat CT.  Will treat as outpt as long as there is not perf seen on CT.  I have put in Augmentin for TX so she doesn't have to use Flagyl.;    • Fibromyalgia     She had been folllowed by Dr. Heineke but he has retired.  She has been unable to find another FM specialist in the area.  She tried seeing Dr. Cordova, but this was a pain clinic and he pushed meds on her that she didn't want to take. She is not interested in narcotics for pain control.   I have agreed to continue her on her current meds and to fill out her disability forms when they come due.  H   • Gallstones    • Hyperlipidemia    • Leukocytosis    • Malignant melanoma of skin (CMS/HCC)    • Non-specific colitis 1/28/2016    Impression: 12/07/2015 - CT from [facility] reviewed.  There is some generalized edematous wall thickening in lucy entire rectosigmoid colon and some in descending colon.  She is tolerating the Augmentin at this point.  I would like for her to see a GI doctor to try to determine what kind of colitis we are dealing with.  She is free to advance her diet as tolerated and I think this will actually help bulk up her stools.  Her WBC has been normal through all of this.;    • Osteopenia 04/13/2015    She will due for dexa next year.[id number] Appointment   • Polyp of sigmoid colon     Due for colonoscopy.  She will contact Dr. Harry Atwood's office to set this up.   • Tubular adenoma of colon 07/31/2014    c-scope Dr. Ornelas - repeat scope 5  years     Past Surgical History:   Procedure Laterality Date   • CHOLECYSTECTOMY     • COLONOSCOPY  01/12/2016    Erythematous mucosa in the entire examined colon, diverticulosis in the sigmoid colon, non-bleeding internal hemorrhoids   • MALIGNANT SKIN LESION EXCISION  2006    legs; melanoma   • TUBAL ABDOMINAL LIGATION         Current Outpatient Prescriptions:   •  B Complex Vitamins (VITAMIN B COMPLEX) capsule capsule, Take  by mouth Daily., Disp: , Rfl:   •  buPROPion XL (WELLBUTRIN XL) 300 MG 24 hr tablet, Take 1 tablet by mouth Daily., Disp: 90 tablet, Rfl: 1  •  CALCIUM PO, Take by mouth., Disp: , Rfl:   •  Cholecalciferol (VITAMIN D-3) 1000 UNITS capsule, Take by mouth. Take as directed, Disp: , Rfl:   •  levothyroxine (SYNTHROID, LEVOTHROID) 75 MCG tablet, Take 1 tablet by mouth Daily., Disp: 30 tablet, Rfl: 6  •  Magnesium 100 MG capsule, Take  by mouth., Disp: , Rfl:   •  melatonin 5 MG tablet tablet, Take by mouth. Take as directed,  Disp: , Rfl:   •  pravastatin (PRAVACHOL) 10 MG tablet, Take 1 tablet by mouth Daily., Disp: 30 tablet, Rfl: 6  •  Probiotic Product (PROBIOTIC PO), Take  by mouth., Disp: , Rfl:   •  traZODone (DESYREL) 50 MG tablet, Take 1 tablet by mouth Every Night., Disp: 90 tablet, Rfl: 1  •  dicyclomine (BENTYL) 20 MG tablet, Take 1 tablet by mouth 4 (Four) Times a Day Before Meals & at Bedtime As Needed (diarrhea)., Disp: 60 tablet, Rfl: 3  •  ultra-purified peppermint oil (IBGARD) 90 mg capsule capsule, Take  by mouth., Disp: , Rfl:   PRN Meds:.  Allergies   Allergen Reactions   • Tetracycline Myalgia   • Tetracyclines & Related      Social History     Social History   • Marital status:      Spouse name: N/A   • Number of children: N/A   • Years of education: N/A     Occupational History   • Not on file.     Social History Main Topics   • Smoking status: Never Smoker   • Smokeless tobacco: Never Used   • Alcohol use Yes      Comment: social drinker   • Drug use: No   • Sexual activity: Not on file     Other Topics Concern   • Not on file     Social History Narrative   • No narrative on file     Family History   Problem Relation Age of Onset   • Cancer Mother         lung   • Coronary artery disease Father    • Glaucoma Father    • Lung cancer Father    • Cancer Father         malignant neoplasm of trachea, bronchus and lung   • Stroke Father      Review of Systems   Constitutional: Negative for appetite change and unexpected weight change.   Gastrointestinal: Positive for abdominal pain and diarrhea.   All other systems reviewed and are negative.    Vitals:    10/01/18 1209   BP: 146/80   Temp: 98.1 °F (36.7 °C)     1    10/01/18  1209   Weight: 61 kg (134 lb 6.4 oz)     Physical Exam   Constitutional: She appears well-developed and well-nourished.   HENT:   Head: Normocephalic and atraumatic.   Eyes: No scleral icterus.   Abdominal: Soft. She exhibits no distension and no mass. There is tenderness.        Neurological: She is alert.   Skin: Skin is warm and dry.   Psychiatric: She has a normal mood and affect.     No images are attached to the encounter.  Diagnoses and all orders for this visit:    Diarrhea, unspecified type    Left lower quadrant pain    Microscopic colitis, unspecified microscopic colitis type    Other orders  -     dicyclomine (BENTYL) 20 MG tablet; Take 1 tablet by mouth 4 (Four) Times a Day Before Meals & at Bedtime As Needed (diarrhea).    Plan-  - At this point I'm concerned that her microscopic colitis is flaring.  It does not seem to be as bad as it was several years ago however urgency is a really big issue for her right now.  She does have Entocort left over from her previous bout.  I have recommended that she start with 2 pills daily and call me with a progress report in about a week.  We will then come up with a plan for tapering, likely over about 6 weeks.  I have refilled her Bentyl and she can use this as needed.  I've also encouraged her to use Imodium as necessary to prevent issues while she is traveling.

## 2018-10-04 LAB
ALBUMIN SERPL-MCNC: 4.2 G/DL (ref 3.5–5.2)
ALP SERPL-CCNC: 50 U/L (ref 39–117)
ALT SERPL-CCNC: 13 U/L (ref 1–33)
AST SERPL-CCNC: 9 U/L (ref 1–32)
BILIRUB DIRECT SERPL-MCNC: <0.2 MG/DL (ref 0–0.3)
BILIRUB SERPL-MCNC: 0.2 MG/DL (ref 0.1–1.2)
CHOLEST SERPL-MCNC: 243 MG/DL (ref 0–200)
HDLC SERPL-MCNC: 79 MG/DL (ref 40–60)
LDLC SERPL CALC-MCNC: 137 MG/DL (ref 0–100)
PROT SERPL-MCNC: 6.4 G/DL (ref 6–8.5)
TRIGL SERPL-MCNC: 137 MG/DL (ref 0–150)
VLDLC SERPL CALC-MCNC: 27.4 MG/DL (ref 5–40)

## 2018-10-09 ENCOUNTER — TELEPHONE (OUTPATIENT)
Dept: GASTROENTEROLOGY | Facility: CLINIC | Age: 68
End: 2018-10-09

## 2018-10-09 NOTE — TELEPHONE ENCOUNTER
"Call returned to pt.  Reports taking Entocort 2 tabs daily and Bentyl prn as prescribed with o/v of 10/1.  \"Much better - I will be able to make my trip\".      States has very occasional constipation.  Asking if should use 1/2 capful miralax prn.    Update/question to Dr Zavala.  "

## 2018-10-09 NOTE — TELEPHONE ENCOUNTER
----- Message from Kyler Molina sent at 10/9/2018  1:51 PM EDT -----  Regarding: PT CALLED WITH AN UPDATE   Contact: [phone]  ..

## 2018-10-11 NOTE — TELEPHONE ENCOUNTER
Called pt and advised per Dr Zavala that if she is feeling constipated , she could go ahead and decrease her entocort to one pill daily. Pt verb understanding.

## 2018-10-29 ENCOUNTER — TELEPHONE (OUTPATIENT)
Dept: GASTROENTEROLOGY | Facility: CLINIC | Age: 68
End: 2018-10-29

## 2018-10-29 NOTE — TELEPHONE ENCOUNTER
----- Message from Kyler Molina sent at 10/29/2018  1:33 PM EDT -----  Regarding: pt called   Contact: [phone]  Pt called, is having some changes in her symptoms. Pt is asking for a call back from a nurse.

## 2018-10-29 NOTE — TELEPHONE ENCOUNTER
Call to pt.  See note of 10/9.  Reports 3 nights ago developed diarrhea - 10x.  Denies blood, fever.   Notes mucous.  Increased entocort to 3 tabs for 1 day, and now taking 2 tabs/day.      Asking if this normal, or what should she do for resumption of symptoms.     Question to Dr Zavala.

## 2018-10-30 NOTE — TELEPHONE ENCOUNTER
It can recur-may be that we need to taper the Entocort more slowly.  Would continue 2 pills daily for 2 weeks and if her symptoms are better then decrease to one pill daily ×4 weeks.  Please see if she needs more pills

## 2018-10-31 NOTE — TELEPHONE ENCOUNTER
Called pt and advised per Dr Zavala it can recur.  May need to taper the entorcort more slowly.  She recommends to continue 2 pills daily for 2 wks and if her symptoms are better then decrease to one pill daily for 4 wks.  Pt verb understanding and states she does not need more pills yet.

## 2018-11-30 ENCOUNTER — TRANSCRIBE ORDERS (OUTPATIENT)
Dept: ADMINISTRATIVE | Facility: HOSPITAL | Age: 68
End: 2018-11-30

## 2018-11-30 DIAGNOSIS — Z13.9 SCREENING PROCEDURE: Primary | ICD-10-CM

## 2018-12-03 ENCOUNTER — APPOINTMENT (OUTPATIENT)
Dept: CARDIOLOGY | Facility: HOSPITAL | Age: 68
End: 2018-12-03

## 2018-12-03 ENCOUNTER — OFFICE VISIT (OUTPATIENT)
Dept: INTERNAL MEDICINE | Facility: CLINIC | Age: 68
End: 2018-12-03

## 2018-12-03 VITALS
SYSTOLIC BLOOD PRESSURE: 140 MMHG | BODY MASS INDEX: 24.92 KG/M2 | WEIGHT: 132 LBS | DIASTOLIC BLOOD PRESSURE: 72 MMHG | HEIGHT: 61 IN | TEMPERATURE: 98.2 F | HEART RATE: 83 BPM | OXYGEN SATURATION: 98 %

## 2018-12-03 DIAGNOSIS — J40 BRONCHITIS: Primary | ICD-10-CM

## 2018-12-03 PROCEDURE — 99213 OFFICE O/P EST LOW 20 MIN: CPT | Performed by: INTERNAL MEDICINE

## 2018-12-03 RX ORDER — BUDESONIDE 3 MG/1
3 CAPSULE, COATED PELLETS ORAL
COMMUNITY
End: 2019-03-04

## 2018-12-03 NOTE — PROGRESS NOTES
Chief Complaint  Quyen Jalloh is a 68 y.o. female who presents for URI and Cough  .    History of Present Illness   Quyen is here for acute care for a new issue.  All last week she was sneezing and had a sore throat.  On Saturday she started getting a lot worse quickly.  It's in her chest.  She is a little short of breath.  No fever.   Nonproductive cough.  She's taken some mucinex.      Review of Systems   Constitution: Positive for malaise/fatigue. Negative for chills and fever.   HENT: Positive for sore throat. Negative for congestion and ear pain.    Respiratory: Positive for cough and sputum production. Negative for wheezing.        Patient Active Problem List   Diagnosis   • Adrenal cortical adenoma   • Decreased hearing   • Fatigue   • Hyperlipidemia   • Hypothyroidism   • Insomnia   • Seasonal allergic rhinitis   • Clicking shoulder   • Shoulder pain   • Vitamin D deficiency   • Osteopenia   • Fibromyalgia   • Neck pain       Past Medical History:   Diagnosis Date   • Bloating    • Depression    • Diverticulitis of colon 1/28/2016    Impression: 11/18/2015 - She had diverticulitis last year.  I think this is a mild flare compared to where she was last year.  Will go ahead and put her on Augmentin for 10 days.  Her WBC is normal now.  Impression: 04/10/2014 - Because of her blood in lucy stool, I am ordering the stat CT.  Will treat as outpt as long as there is not perf seen on CT.  I have put in Augmentin for TX so she doesn't have to use Flagyl.;    • Fibromyalgia     She had been folllowed by Dr. Heineke but he has retired.  She has been unable to find another FM specialist in the area.  She tried seeing Dr. Cordova, but this was a pain clinic and he pushed meds on her that she didn't want to take. She is not interested in narcotics for pain control.  I have agreed to continue her on her current meds and to fill out her disability forms when they come due.  H   • Gallstones    • Hyperlipidemia    •  Leukocytosis    • Malignant melanoma of skin (CMS/HCC)    • Non-specific colitis 1/28/2016    Impression: 12/07/2015 - CT from [facility] reviewed.  There is some generalized edematous wall thickening in lucy entire rectosigmoid colon and some in descending colon.  She is tolerating the Augmentin at this point.  I would like for her to see a GI doctor to try to determine what kind of colitis we are dealing with.  She is free to advance her diet as tolerated and I think this will actually help bulk up her stools.  Her WBC has been normal through all of this.;    • Osteopenia 04/13/2015    She will due for dexa next year.[id number] Appointment   • Polyp of sigmoid colon     Due for colonoscopy.  She will contact Dr. Harry Atwood's office to set this up.   • Tubular adenoma of colon 07/31/2014    c-scope Dr. Ornelas - repeat scope 5  years       Past Surgical History:   Procedure Laterality Date   • CHOLECYSTECTOMY     • COLONOSCOPY  01/12/2016    Erythematous mucosa in the entire examined colon, diverticulosis in the sigmoid colon, non-bleeding internal hemorrhoids   • MALIGNANT SKIN LESION EXCISION  2006    legs; melanoma   • TUBAL ABDOMINAL LIGATION         Family History   Problem Relation Age of Onset   • Cancer Mother         lung   • Coronary artery disease Father    • Glaucoma Father    • Lung cancer Father    • Cancer Father         malignant neoplasm of trachea, bronchus and lung   • Stroke Father        Social History     Socioeconomic History   • Marital status:      Spouse name: Not on file   • Number of children: Not on file   • Years of education: Not on file   • Highest education level: Not on file   Social Needs   • Financial resource strain: Not on file   • Food insecurity - worry: Not on file   • Food insecurity - inability: Not on file   • Transportation needs - medical: Not on file   • Transportation needs - non-medical: Not on file   Occupational History   • Not on file   Tobacco Use   • Smoking  "status: Never Smoker   • Smokeless tobacco: Never Used   Substance and Sexual Activity   • Alcohol use: Yes     Comment: social drinker   • Drug use: No   • Sexual activity: Not on file   Other Topics Concern   • Not on file   Social History Narrative   • Not on file       Current Outpatient Medications on File Prior to Visit   Medication Sig Dispense Refill   • B Complex Vitamins (VITAMIN B COMPLEX) capsule capsule Take  by mouth Daily.     • Budesonide (ENTOCORT EC) 3 MG 24 hr capsule Take 3 mg by mouth. Two tablets once daily     • buPROPion XL (WELLBUTRIN XL) 300 MG 24 hr tablet Take 1 tablet by mouth Daily. 90 tablet 1   • CALCIUM PO Take by mouth.     • Cholecalciferol (VITAMIN D-3) 1000 UNITS capsule Take by mouth. Take as directed     • dicyclomine (BENTYL) 20 MG tablet Take 1 tablet by mouth 4 (Four) Times a Day Before Meals & at Bedtime As Needed (diarrhea). 60 tablet 3   • levothyroxine (SYNTHROID, LEVOTHROID) 75 MCG tablet Take 1 tablet by mouth Daily. 30 tablet 6   • melatonin 5 MG tablet tablet Take by mouth. Take as directed     • pravastatin (PRAVACHOL) 10 MG tablet Take 1 tablet by mouth Daily. 30 tablet 6   • Probiotic Product (PROBIOTIC PO) Take  by mouth.     • traZODone (DESYREL) 50 MG tablet Take 1 tablet by mouth Every Night. 90 tablet 1   • [DISCONTINUED] Magnesium 100 MG capsule Take  by mouth.     • [DISCONTINUED] ultra-purified peppermint oil (IBGARD) 90 mg capsule capsule Take  by mouth.       No current facility-administered medications on file prior to visit.        Allergies   Allergen Reactions   • Tetracycline Myalgia   • Tetracyclines & Related        Vitals:    12/03/18 1501   BP: 140/72   Pulse: 83   Temp: 98.2 °F (36.8 °C)   SpO2: 98%   Weight: 59.9 kg (132 lb)   Height: 154.9 cm (60.98\")       Body mass index is 24.95 kg/m².    Objective   Physical Exam   Constitutional: She is oriented to person, place, and time. She appears well-developed and well-nourished. No distress. "   HENT:   Head: Normocephalic and atraumatic.   Mouth/Throat: Posterior oropharyngeal erythema present. No tonsillar exudate.   Neck: Normal range of motion. Neck supple.   Cardiovascular: Normal rate and regular rhythm.   Pulmonary/Chest: Effort normal and breath sounds normal. No stridor. No respiratory distress. She has no wheezes.   Lymphadenopathy:     She has no cervical adenopathy.   Neurological: She is alert and oriented to person, place, and time. No cranial nerve deficit.       Results for orders placed or performed in visit on 09/30/18   Hepatic Function Panel   Result Value Ref Range    Total Protein 6.4 6.0 - 8.5 g/dL    Albumin 4.20 3.50 - 5.20 g/dL    Total Bilirubin 0.2 0.1 - 1.2 mg/dL    Bilirubin, Direct <0.2 0.0 - 0.3 mg/dL    Alkaline Phosphatase 50 39 - 117 U/L    AST (SGOT) 9 1 - 32 U/L    ALT (SGPT) 13 1 - 33 U/L   Lipid Panel   Result Value Ref Range    Total Cholesterol 243 (H) 0 - 200 mg/dL    Triglycerides 137 0 - 150 mg/dL    HDL Cholesterol 79 (H) 40 - 60 mg/dL    VLDL Cholesterol 27.4 5 - 40 mg/dL    LDL Cholesterol  137 (H) 0 - 100 mg/dL       Assessment/Plan   Diagnoses and all orders for this visit:    Bronchitis    Other orders  -     Budesonide (ENTOCORT EC) 3 MG 24 hr capsule; Take 3 mg by mouth. Two tablets once daily        Discussion/Summary  Quyen is here for acute care for a new issue.  She has bronchitis.  Advised that most bronchitis is viral.  Continue mucinex and fluids tessalon perles.    If her symptoms worsen or she develops high fever or soa will need to RTC..    No Follow-up on file.

## 2018-12-10 RX ORDER — PRAVASTATIN SODIUM 10 MG
10 TABLET ORAL DAILY
Qty: 90 TABLET | Refills: 1 | Status: SHIPPED | OUTPATIENT
Start: 2018-12-10 | End: 2019-07-17

## 2018-12-10 RX ORDER — LEVOTHYROXINE SODIUM 0.07 MG/1
75 TABLET ORAL DAILY
Qty: 90 TABLET | Refills: 1 | Status: SHIPPED | OUTPATIENT
Start: 2018-12-10 | End: 2019-06-04 | Stop reason: SDUPTHER

## 2018-12-12 ENCOUNTER — TRANSCRIBE ORDERS (OUTPATIENT)
Dept: ADMINISTRATIVE | Facility: HOSPITAL | Age: 68
End: 2018-12-12

## 2018-12-12 DIAGNOSIS — Z13.9 VISIT FOR SCREENING: Primary | ICD-10-CM

## 2018-12-14 ENCOUNTER — APPOINTMENT (OUTPATIENT)
Dept: CARDIOLOGY | Facility: HOSPITAL | Age: 68
End: 2018-12-14

## 2018-12-14 ENCOUNTER — HOSPITAL ENCOUNTER (OUTPATIENT)
Dept: CARDIOLOGY | Facility: HOSPITAL | Age: 68
Discharge: HOME | End: 2018-12-14
Admitting: INTERNAL MEDICINE

## 2018-12-14 VITALS
BODY MASS INDEX: 25.3 KG/M2 | SYSTOLIC BLOOD PRESSURE: 134 MMHG | HEIGHT: 61 IN | WEIGHT: 134 LBS | HEART RATE: 74 BPM | DIASTOLIC BLOOD PRESSURE: 69 MMHG

## 2018-12-14 DIAGNOSIS — Z13.9 VISIT FOR SCREENING: ICD-10-CM

## 2018-12-14 LAB
BH CV ECHO MEAS - DIST AO DIAM: 1.27 CM
BH CV VAS BP LEFT ARM: NORMAL MMHG
BH CV VAS BP RIGHT ARM: NORMAL MMHG
BH CV XLRA MEAS - MID AO DIAM: 1.49 CM
BH CV XLRA MEAS - PAD LEFT ABI DP: 1.04
BH CV XLRA MEAS - PAD LEFT ABI PT: 1.01
BH CV XLRA MEAS - PAD LEFT ARM: 134 MMHG
BH CV XLRA MEAS - PAD LEFT LEG DP: 140 MMHG
BH CV XLRA MEAS - PAD LEFT LEG PT: 136 MMHG
BH CV XLRA MEAS - PAD RIGHT ABI DP: 1
BH CV XLRA MEAS - PAD RIGHT ABI PT: 1
BH CV XLRA MEAS - PAD RIGHT ARM: 127 MMHG
BH CV XLRA MEAS - PAD RIGHT LEG DP: 134 MMHG
BH CV XLRA MEAS - PAD RIGHT LEG PT: 134 MMHG
BH CV XLRA MEAS - PROX AO DIAM: 1.71 CM
BH CV XLRA MEAS LEFT ICA/CCA RATIO: 1.03
BH CV XLRA MEAS LEFT MID CCA PSV: NORMAL CM/SEC
BH CV XLRA MEAS LEFT MID ICA PSV: NORMAL CM/SEC
BH CV XLRA MEAS LEFT PROX ECA PSV: NORMAL CM/SEC
BH CV XLRA MEAS RIGHT ICA/CCA RATIO: 0.86
BH CV XLRA MEAS RIGHT MID CCA PSV: NORMAL CM/SEC
BH CV XLRA MEAS RIGHT MID ICA PSV: NORMAL CM/SEC
BH CV XLRA MEAS RIGHT PROX ECA PSV: NORMAL CM/SEC

## 2018-12-14 PROCEDURE — 93799 UNLISTED CV SVC/PROCEDURE: CPT

## 2019-01-10 ENCOUNTER — TELEPHONE (OUTPATIENT)
Dept: GASTROENTEROLOGY | Facility: CLINIC | Age: 69
End: 2019-01-10

## 2019-01-10 ENCOUNTER — TELEPHONE (OUTPATIENT)
Dept: INTERNAL MEDICINE | Facility: CLINIC | Age: 69
End: 2019-01-10

## 2019-01-10 RX ORDER — DICYCLOMINE HCL 20 MG
20 TABLET ORAL
Qty: 180 TABLET | Refills: 2 | Status: SHIPPED | OUTPATIENT
Start: 2019-01-10

## 2019-01-10 NOTE — TELEPHONE ENCOUNTER
----- Message from Veronica Dunaway MD sent at 1/10/2019  2:56 PM EST -----  Please call - her dexa is stable and unchanged.

## 2019-01-10 NOTE — TELEPHONE ENCOUNTER
Received fax request from [facility] pharmacy for refill on dicyclomine. Per Dr Zavala dicyclomine 20mg  Take one po 4x a day before meals and bedtime #180 with 2 refills was escribed to pt's [facility].

## 2019-01-10 NOTE — TELEPHONE ENCOUNTER
Pt informed, states understanding.     Pt stated that her statin she is on is causing her horrible muscle soreness and tightness, had a vascular study done and it was clear.     Crestor, livalo and pravastatin.     Stated that her bp has been up since on the statin, thinks that this is due to the pain she was in.   Was getting a white crusted rash on the tops of her feet, but since she stopped the statin, this has stopped as well.     Please advise a new cholesterol medication.

## 2019-01-15 NOTE — TELEPHONE ENCOUNTER
Pt is going to think about which she would like to try.     Wanted to know if her recent vascular studies changes her risk assessment at all?

## 2019-01-16 ENCOUNTER — TELEPHONE (OUTPATIENT)
Dept: INTERNAL MEDICINE | Facility: CLINIC | Age: 69
End: 2019-01-16

## 2019-01-16 RX ORDER — FENOFIBRATE 120 MG/1
120 TABLET ORAL DAILY
Qty: 30 EACH | Refills: 4 | Status: SHIPPED | OUTPATIENT
Start: 2019-01-16 | End: 2019-01-17

## 2019-01-16 NOTE — TELEPHONE ENCOUNTER
Pt informed, states understanding.   Has several questions about how the two medications listed as options are metabolized.

## 2019-01-16 NOTE — TELEPHONE ENCOUNTER
Fenofibrate, would like to do #30 with refills to [facility] local [facility].   Will call make to schedule a fasting lab apt.

## 2019-01-16 NOTE — TELEPHONE ENCOUNTER
I spoke with Quyen and explained that with her lipids where they are off of statins, we should try one of these meds.  They are not first line meds but she cannot tolerate statins.  She was able to tolerate livalo but her insurance won't cover it and it's far too expensive.  She is worried about these meds causing diarrhea.  We discussed that diarrhea is listed as a side effect of the majority of meds.  If diarrhea becomes an issue, we will stop the med.  She is going to look and see which of these meds her insurance will cover and let us know.

## 2019-01-16 NOTE — TELEPHONE ENCOUNTER
The vascular screen does not change the actual risk calculation as this is not part of the calculator.

## 2019-01-17 ENCOUNTER — TELEPHONE (OUTPATIENT)
Dept: INTERNAL MEDICINE | Facility: CLINIC | Age: 69
End: 2019-01-17

## 2019-01-17 RX ORDER — FENOFIBRATE 145 MG/1
145 TABLET, COATED ORAL DAILY
Qty: 30 TABLET | Refills: 4 | Status: SHIPPED | OUTPATIENT
Start: 2019-01-17 | End: 2019-05-11 | Stop reason: SDUPTHER

## 2019-01-17 NOTE — TELEPHONE ENCOUNTER
Her insurance covered the fenofibrate 120mg, but they are wanting her to pay the $300 even after the prior authorization was approved. Only the fenofibrate 145 and 160 are covered and would only cost 13$

## 2019-02-12 ENCOUNTER — TELEPHONE (OUTPATIENT)
Dept: INTERNAL MEDICINE | Facility: CLINIC | Age: 69
End: 2019-02-12

## 2019-02-12 RX ORDER — AMOXICILLIN 875 MG/1
875 TABLET, COATED ORAL 2 TIMES DAILY
Qty: 20 TABLET | Refills: 0 | Status: SHIPPED | OUTPATIENT
Start: 2019-02-12 | End: 2019-03-04

## 2019-02-12 NOTE — TELEPHONE ENCOUNTER
Pt called and stated that her and her  both became sick at the same time, have the same symptoms. He was diagnosed with strep today, pt wants to know if you can send her something in or if you recommend her going by an uc to get tested and treated.

## 2019-02-12 NOTE — TELEPHONE ENCOUNTER
If he truly has strep and she has mainly a sore throat, then I am ok calling something in this time.

## 2019-02-12 NOTE — TELEPHONE ENCOUNTER
Pt stated that her  had to go to the doctor today for a f/u, they ended up strep testing him and he was positive. They had been around their grandchildren that were diagnosed with strep as well.   Aware you normally don't send in anything over the phone, but with the circumstances and the fact that she has the same symptoms (overall not feeling well with sore throat),  would consider sending something in.     Stated that they had put her  on augmentin. Her pharmacy is the [facility]

## 2019-02-27 DIAGNOSIS — E78.2 MIXED HYPERLIPIDEMIA: Primary | ICD-10-CM

## 2019-03-04 ENCOUNTER — OFFICE VISIT (OUTPATIENT)
Dept: GASTROENTEROLOGY | Facility: CLINIC | Age: 69
End: 2019-03-04

## 2019-03-04 VITALS
WEIGHT: 135 LBS | HEIGHT: 61 IN | SYSTOLIC BLOOD PRESSURE: 124 MMHG | BODY MASS INDEX: 25.49 KG/M2 | DIASTOLIC BLOOD PRESSURE: 90 MMHG | TEMPERATURE: 98.3 F

## 2019-03-04 DIAGNOSIS — R15.2 FECAL URGENCY: ICD-10-CM

## 2019-03-04 DIAGNOSIS — R14.0 BLOATING: ICD-10-CM

## 2019-03-04 DIAGNOSIS — K58.0 IRRITABLE BOWEL SYNDROME WITH DIARRHEA: Primary | ICD-10-CM

## 2019-03-04 LAB
ALBUMIN SERPL-MCNC: 4.4 G/DL (ref 3.5–5.2)
ALBUMIN/GLOB SERPL: 2.3 G/DL
ALP SERPL-CCNC: 40 U/L (ref 39–117)
ALT SERPL-CCNC: 31 U/L (ref 1–33)
AST SERPL-CCNC: 23 U/L (ref 1–32)
BASOPHILS # BLD AUTO: 0.08 10*3/MM3 (ref 0–0.2)
BASOPHILS NFR BLD AUTO: 1.2 % (ref 0–1.5)
BILIRUB SERPL-MCNC: 0.3 MG/DL (ref 0.1–1.2)
BUN SERPL-MCNC: 16 MG/DL (ref 8–23)
BUN/CREAT SERPL: 18 (ref 7–25)
CALCIUM SERPL-MCNC: 9.8 MG/DL (ref 8.6–10.5)
CHLORIDE SERPL-SCNC: 103 MMOL/L (ref 98–107)
CHOLEST SERPL-MCNC: 224 MG/DL (ref 0–200)
CO2 SERPL-SCNC: 28 MMOL/L (ref 22–29)
CREAT SERPL-MCNC: 0.89 MG/DL (ref 0.57–1)
EOSINOPHIL # BLD AUTO: 0.25 10*3/MM3 (ref 0–0.4)
EOSINOPHIL NFR BLD AUTO: 3.8 % (ref 0.3–6.2)
ERYTHROCYTE [DISTWIDTH] IN BLOOD BY AUTOMATED COUNT: 12.9 % (ref 12.3–15.4)
GLOBULIN SER CALC-MCNC: 1.9 GM/DL
GLUCOSE SERPL-MCNC: 101 MG/DL (ref 65–99)
HCT VFR BLD AUTO: 45 % (ref 34–46.6)
HDLC SERPL-MCNC: 79 MG/DL (ref 40–60)
HGB BLD-MCNC: 13.9 G/DL (ref 12–15.9)
IMM GRANULOCYTES # BLD AUTO: 0.04 10*3/MM3 (ref 0–0.05)
IMM GRANULOCYTES NFR BLD AUTO: 0.6 % (ref 0–0.5)
LDLC SERPL CALC-MCNC: 128 MG/DL (ref 0–100)
LYMPHOCYTES # BLD AUTO: 1.64 10*3/MM3 (ref 0.7–3.1)
LYMPHOCYTES NFR BLD AUTO: 24.6 % (ref 19.6–45.3)
MCH RBC QN AUTO: 28.9 PG (ref 26.6–33)
MCHC RBC AUTO-ENTMCNC: 30.9 G/DL (ref 31.5–35.7)
MCV RBC AUTO: 93.6 FL (ref 79–97)
MONOCYTES # BLD AUTO: 0.55 10*3/MM3 (ref 0.1–0.9)
MONOCYTES NFR BLD AUTO: 8.3 % (ref 5–12)
NEUTROPHILS # BLD AUTO: 4.1 10*3/MM3 (ref 1.4–7)
NEUTROPHILS NFR BLD AUTO: 61.5 % (ref 42.7–76)
NRBC BLD AUTO-RTO: 0 /100 WBC (ref 0–0)
PLATELET # BLD AUTO: 329 10*3/MM3 (ref 140–450)
POTASSIUM SERPL-SCNC: 4.5 MMOL/L (ref 3.5–5.2)
PROT SERPL-MCNC: 6.3 G/DL (ref 6–8.5)
RBC # BLD AUTO: 4.81 10*6/MM3 (ref 3.77–5.28)
SODIUM SERPL-SCNC: 142 MMOL/L (ref 136–145)
TRIGL SERPL-MCNC: 86 MG/DL (ref 0–150)
VLDLC SERPL CALC-MCNC: 17.2 MG/DL (ref 5–40)
WBC # BLD AUTO: 6.66 10*3/MM3 (ref 3.4–10.8)

## 2019-03-04 PROCEDURE — 99214 OFFICE O/P EST MOD 30 MIN: CPT | Performed by: NURSE PRACTITIONER

## 2019-03-04 NOTE — PROGRESS NOTES
Chief Complaint   Patient presents with   • Follow-up   • Irritable Bowel Syndrome       Quyen Jalloh is a  68 y.o. female here for a follow up visit for IBS-D.     HPI     This is an established patient followed with Dr. Zavala, new to me, seen today for IBS diarrhea predominant.  She has a history of microscopic colitis and diverticulitis.  Her main complaint is urgency mainly in the morning.  She is reluctant to leave the house before 1030.  The rest the day she will feel fine.  She is having multiple soft  stools in the morning.  There is associated bloating.  She denies rectal bleeding.  She takes a fiber supplement daily.  She uses Bentyl as needed.  If she takes more than 1 dose of Bentyl a day she will be more prone to constipation.  Constipation for her is no bowel movement for 2 days.  Occasionally she will use half a tablet of Imodium.  She reports feeling the best with urgency and bloating after a course of Xifaxan.    Regarding upper GI symptoms, she denies nausea, vomiting, heartburn, acid reflux, or dysphagia.  Her appetite is excellent.  Her weight is stable.    She had a panel of labs drawn this morning with her primary care provider.    Her last colonoscopy was 2016 notable for microscopic colitis.  She was successfully treated with slow taper Entocort.    She was treated in the past couple of months with antibiotics for strep throat.    Past Medical History:   Diagnosis Date   • Bloating    • Depression    • Diverticulitis of colon 1/28/2016    Impression: 11/18/2015 - She had diverticulitis last year.  I think this is a mild flare compared to where she was last year.  Will go ahead and put her on Augmentin for 10 days.  Her WBC is normal now.  Impression: 04/10/2014 - Because of her blood in lucy stool, I am ordering the stat CT.  Will treat as outpt as long as there is not perf seen on CT.  I have put in Augmentin for TX so she doesn't have to use Flagyl.;    • Fibromyalgia     She had been  valerielowed by Dr. Heineke but he has retired.  She has been unable to find another FM specialist in the area.  She tried seeing Dr. Cordova, but this was a pain clinic and he pushed meds on her that she didn't want to take. She is not interested in narcotics for pain control.  I have agreed to continue her on her current meds and to fill out her disability forms when they come due.  H   • Gallstones    • Hyperlipidemia    • Leukocytosis    • Malignant melanoma of skin (CMS/HCC)    • Non-specific colitis 1/28/2016    Impression: 12/07/2015 - CT from [facility] reviewed.  There is some generalized edematous wall thickening in lucy entire rectosigmoid colon and some in descending colon.  She is tolerating the Augmentin at this point.  I would like for her to see a GI doctor to try to determine what kind of colitis we are dealing with.  She is free to advance her diet as tolerated and I think this will actually help bulk up her stools.  Her WBC has been normal through all of this.;    • Osteopenia 04/13/2015    She will due for dexa next year.[id number] Appointment   • Polyp of sigmoid colon     Due for colonoscopy.  She will contact Dr. Harry Atwood's office to set this up.   • Tubular adenoma of colon 07/31/2014    c-scope Dr. Ornelas - repeat scope 5  years       Past Surgical History:   Procedure Laterality Date   • CHOLECYSTECTOMY     • COLONOSCOPY  01/12/2016    Erythematous mucosa in the entire examined colon, diverticulosis in the sigmoid colon, non-bleeding internal hemorrhoids   • MALIGNANT SKIN LESION EXCISION  2006    legs; melanoma   • TUBAL ABDOMINAL LIGATION         Scheduled Meds:  Outpatient Encounter Medications as of 3/4/2019   Medication Sig Dispense Refill   • B Complex Vitamins (VITAMIN B COMPLEX) capsule capsule Take  by mouth Daily.     • buPROPion XL (WELLBUTRIN XL) 300 MG 24 hr tablet Take 1 tablet by mouth Daily. 90 tablet 1   • CALCIUM PO Take by mouth.     • Cholecalciferol (VITAMIN D-3) 1000  UNITS capsule Take by mouth. Take as directed     • dicyclomine (BENTYL) 20 MG tablet Take 1 tablet by mouth 4 (Four) Times a Day Before Meals & at Bedtime As Needed (diarrhea). 180 tablet 2   • fenofibrate (TRICOR) 145 MG tablet Take 1 tablet by mouth Daily. 30 tablet 4   • levothyroxine (SYNTHROID, LEVOTHROID) 75 MCG tablet Take 1 tablet by mouth Daily. 90 tablet 1   • melatonin 5 MG tablet tablet Take by mouth. Take as directed     • Probiotic Product (PROBIOTIC PO) Take  by mouth.     • traZODone (DESYREL) 50 MG tablet Take 1 tablet by mouth Every Night. 90 tablet 1   • pravastatin (PRAVACHOL) 10 MG tablet Take 1 tablet by mouth Daily. 90 tablet 1   • [DISCONTINUED] amoxicillin (AMOXIL) 875 MG tablet Take 1 tablet by mouth 2 (Two) Times a Day. 20 tablet 0   • [DISCONTINUED] Budesonide (ENTOCORT EC) 3 MG 24 hr capsule Take 3 mg by mouth. Two tablets once daily       No facility-administered encounter medications on file as of 3/4/2019.        Continuous Infusions:  No current facility-administered medications for this visit.     PRN Meds:.    Allergies   Allergen Reactions   • Tetracycline Myalgia   • Tetracyclines & Related        Social History     Socioeconomic History   • Marital status:      Spouse name: Not on file   • Number of children: Not on file   • Years of education: Not on file   • Highest education level: Not on file   Social Needs   • Financial resource strain: Not on file   • Food insecurity - worry: Not on file   • Food insecurity - inability: Not on file   • Transportation needs - medical: Not on file   • Transportation needs - non-medical: Not on file   Occupational History   • Not on file   Tobacco Use   • Smoking status: Never Smoker   • Smokeless tobacco: Never Used   Substance and Sexual Activity   • Alcohol use: Yes     Comment: social drinker   • Drug use: No   • Sexual activity: Not on file   Other Topics Concern   • Not on file   Social History Narrative   • Not on file        Family History   Problem Relation Age of Onset   • Cancer Mother         lung   • Coronary artery disease Father    • Glaucoma Father    • Lung cancer Father    • Cancer Father         malignant neoplasm of trachea, bronchus and lung   • Stroke Father    • Heart disease Father        Review of Systems   Constitutional: Negative for activity change, appetite change, fatigue, fever and unexpected weight change.   HENT: Negative for trouble swallowing.    Respiratory: Negative for apnea, cough, choking, chest tightness, shortness of breath and wheezing.    Cardiovascular: Negative for chest pain, palpitations and leg swelling.   Gastrointestinal: Negative for abdominal distention, abdominal pain, anal bleeding, blood in stool, constipation, diarrhea, nausea, rectal pain and vomiting.        + Urgency, bloating       Vitals:    03/04/19 1257   BP: 124/90   Temp: 98.3 °F (36.8 °C)       Physical Exam   Constitutional: She is oriented to person, place, and time. She appears well-developed and well-nourished.   Eyes: Pupils are equal, round, and reactive to light.   Neck: Normal range of motion.   Cardiovascular: Normal rate, regular rhythm and normal heart sounds.   Pulmonary/Chest: Effort normal and breath sounds normal. No respiratory distress. She has no wheezes.   Abdominal: Soft. Bowel sounds are normal. She exhibits no distension and no mass. There is no tenderness. There is no guarding. No hernia.   Musculoskeletal: Normal range of motion.   Neurological: She is alert and oriented to person, place, and time.   Skin: Skin is warm and dry. Capillary refill takes less than 2 seconds.   Psychiatric: She has a normal mood and affect. Her behavior is normal.       No images are attached to the encounter.    Quyen was seen today for follow-up and irritable bowel syndrome.    Diagnoses and all orders for this visit:    Irritable bowel syndrome with diarrhea    Fecal urgency    Bloating    Assessment    #1 irritable  bowel with diarrhea, history of diverticulitis and microscopic colitis.  #2 fecal urgency  #3 bloating    Plan    Course of Xifaxan, samples provided today to complete entire course.  I asked that she call us and update us when she is finished course of therapy.  If symptoms worsen or she develops diarrhea consider stool studies to rule out infectious pathogens given recent antibiotic use.  Continue antispasmodic.  Continue probiotics.  Use Imodium as needed.  Follow-up 6 weeks.

## 2019-03-14 DIAGNOSIS — F51.01 PRIMARY INSOMNIA: ICD-10-CM

## 2019-03-14 RX ORDER — BUPROPION HYDROCHLORIDE 300 MG/1
TABLET ORAL
Qty: 90 TABLET | Refills: 1 | Status: SHIPPED | OUTPATIENT
Start: 2019-03-14 | End: 2019-05-20 | Stop reason: SDUPTHER

## 2019-03-14 RX ORDER — TRAZODONE HYDROCHLORIDE 50 MG/1
TABLET ORAL
Qty: 90 TABLET | Refills: 1 | Status: SHIPPED | OUTPATIENT
Start: 2019-03-14 | End: 2019-06-04 | Stop reason: SDUPTHER

## 2019-04-08 ENCOUNTER — OFFICE VISIT (OUTPATIENT)
Dept: GASTROENTEROLOGY | Facility: CLINIC | Age: 69
End: 2019-04-08

## 2019-04-08 VITALS
HEIGHT: 61 IN | SYSTOLIC BLOOD PRESSURE: 104 MMHG | BODY MASS INDEX: 25 KG/M2 | TEMPERATURE: 98.3 F | WEIGHT: 132.4 LBS | DIASTOLIC BLOOD PRESSURE: 80 MMHG

## 2019-04-08 DIAGNOSIS — R15.2 FECAL URGENCY: ICD-10-CM

## 2019-04-08 DIAGNOSIS — K58.0 IRRITABLE BOWEL SYNDROME WITH DIARRHEA: Primary | ICD-10-CM

## 2019-04-08 PROCEDURE — 99214 OFFICE O/P EST MOD 30 MIN: CPT | Performed by: NURSE PRACTITIONER

## 2019-04-08 NOTE — PROGRESS NOTES
"Chief Complaint   Patient presents with   • Follow-up     HPI    Quyen Jalloh is a  68 y.o. female here for a follow up visit for IBS diarrhea predominant.  She follows with Dr. Zavala.  Has a history of microscopic colitis and diverticulitis.  On last visit she complained of urgency and occasional loose stools.  I treated her with a course of Xifaxan with samples as she cannot afford this medication too expensive with her insurance.  On visit today she is much improved.  Diarrhea is completely resolved.  Still occasional urgency and some lower abdominal cramping relieved with antispasmodic.  Her bowels are moving daily with soft stool.  Denies rectal bleeding.  She is quite nervous about recurrence of diarrhea especially when she \"has a full day.\"  She will preemptively take 1 Imodium which does seem to help but can lead to no bowel movement for more than 24 hours.  She has not tried a lactose-free diet.  She continues on a generic probiotic.  She is also taking 1 tablespoon of Metamucil a day.    Regarding upper GI symptoms, she denies nausea, vomiting, acid reflux, heartburn, or dysphagia.  Appetite is good.  Weight is stable.    She is up-to-date in terms of colon cancer screening.  She is due for colonoscopy in 2021.        Past Medical History:   Diagnosis Date   • Bloating    • Depression    • Diverticulitis of colon 1/28/2016    Impression: 11/18/2015 - She had diverticulitis last year.  I think this is a mild flare compared to where she was last year.  Will go ahead and put her on Augmentin for 10 days.  Her WBC is normal now.  Impression: 04/10/2014 - Because of her blood in lucy stool, I am ordering the stat CT.  Will treat as outpt as long as there is not perf seen on CT.  I have put in Augmentin for TX so she doesn't have to use Flagyl.;    • Fibromyalgia     She had been folllowed by Dr. Heineke but he has retired.  She has been unable to find another FM specialist in the area.  She tried seeing " Dr. Cordova, but this was a pain clinic and he pushed meds on her that she didn't want to take. She is not interested in narcotics for pain control.  I have agreed to continue her on her current meds and to fill out her disability forms when they come due.  H   • Gallstones    • Hyperlipidemia    • Leukocytosis    • Malignant melanoma of skin (CMS/HCC)    • Non-specific colitis 1/28/2016    Impression: 12/07/2015 - CT from [facility] reviewed.  There is some generalized edematous wall thickening in lucy entire rectosigmoid colon and some in descending colon.  She is tolerating the Augmentin at this point.  I would like for her to see a GI doctor to try to determine what kind of colitis we are dealing with.  She is free to advance her diet as tolerated and I think this will actually help bulk up her stools.  Her WBC has been normal through all of this.;    • Osteopenia 04/13/2015    She will due for dexa next year.[id number] Appointment   • Polyp of sigmoid colon     Due for colonoscopy.  She will contact Dr. Harry Atwood's office to set this up.   • Tubular adenoma of colon 07/31/2014    c-scope Dr. Ornelas - repeat scope 5  years       Past Surgical History:   Procedure Laterality Date   • CHOLECYSTECTOMY     • COLONOSCOPY  01/12/2016    Erythematous mucosa in the entire examined colon, diverticulosis in the sigmoid colon, non-bleeding internal hemorrhoids   • MALIGNANT SKIN LESION EXCISION  2006    legs; melanoma   • TUBAL ABDOMINAL LIGATION         Scheduled Meds:  Outpatient Encounter Medications as of 4/8/2019   Medication Sig Dispense Refill   • buPROPion XL (WELLBUTRIN XL) 300 MG 24 hr tablet TAKE 1 TABLET BY MOUTH EVERY DAY 90 tablet 1   • CALCIUM PO Take by mouth.     • Cholecalciferol (VITAMIN D-3) 1000 UNITS capsule Take by mouth. Take as directed     • dicyclomine (BENTYL) 20 MG tablet Take 1 tablet by mouth 4 (Four) Times a Day Before Meals & at Bedtime As Needed (diarrhea). 180 tablet 2   • fenofibrate  (TRICOR) 145 MG tablet Take 1 tablet by mouth Daily. 30 tablet 4   • levothyroxine (SYNTHROID, LEVOTHROID) 75 MCG tablet Take 1 tablet by mouth Daily. 90 tablet 1   • melatonin 5 MG tablet tablet Take by mouth. Take as directed     • Probiotic Product (PROBIOTIC PO) Take  by mouth.     • traZODone (DESYREL) 50 MG tablet TAKE 1 TABLET BY MOUTH EVERY DAY AT NIGHT 90 tablet 1   • B Complex Vitamins (VITAMIN B COMPLEX) capsule capsule Take  by mouth Daily.     • pravastatin (PRAVACHOL) 10 MG tablet Take 1 tablet by mouth Daily. 90 tablet 1     No facility-administered encounter medications on file as of 4/8/2019.        Continuous Infusions:  No current facility-administered medications for this visit.     PRN Meds:.    Allergies   Allergen Reactions   • Tetracycline Myalgia     Caused abdominal pain 30 years ago per pt    • Tetracyclines & Related        Social History     Socioeconomic History   • Marital status:      Spouse name: Not on file   • Number of children: Not on file   • Years of education: Not on file   • Highest education level: Not on file   Tobacco Use   • Smoking status: Never Smoker   • Smokeless tobacco: Never Used   Substance and Sexual Activity   • Alcohol use: Yes     Comment: social drinker   • Drug use: No       Family History   Problem Relation Age of Onset   • Cancer Mother         lung   • Coronary artery disease Father    • Glaucoma Father    • Lung cancer Father    • Cancer Father         malignant neoplasm of trachea, bronchus and lung   • Stroke Father    • Heart disease Father        Review of Systems   Constitutional: Negative for activity change, appetite change, fatigue, fever and unexpected weight change.   HENT: Negative for trouble swallowing.    Respiratory: Negative for apnea, cough, choking, chest tightness, shortness of breath and wheezing.    Cardiovascular: Negative for chest pain, palpitations and leg swelling.   Gastrointestinal: Negative for abdominal distention,  abdominal pain, anal bleeding, blood in stool, constipation, diarrhea, nausea, rectal pain and vomiting.       Vitals:    04/08/19 1436   BP: 104/80   Temp: 98.3 °F (36.8 °C)       Physical Exam   Constitutional: She is oriented to person, place, and time. She appears well-developed and well-nourished.   Eyes: Pupils are equal, round, and reactive to light.   Cardiovascular: Normal rate, regular rhythm and normal heart sounds.   Pulmonary/Chest: Effort normal and breath sounds normal. No respiratory distress. She has no wheezes.   Abdominal: Soft. Bowel sounds are normal. She exhibits no distension and no mass. There is no tenderness. There is no guarding. No hernia.   Musculoskeletal: Normal range of motion.   Neurological: She is alert and oriented to person, place, and time.   Skin: Skin is warm and dry. Capillary refill takes less than 2 seconds.   Psychiatric: She has a normal mood and affect. Her behavior is normal.       No images are attached to the encounter.    Quyen was seen today for follow-up.    Diagnoses and all orders for this visit:    Irritable bowel syndrome with diarrhea    Fecal urgency    Impression:    This is a pleasant 68-year-old female seen today in follow-up on irritable bowel syndrome with diarrhea and fecal urgency.  Symptoms are much improved after course of Xifaxan unfortunately she has concerns about repeating therapy and the cost of the medication as she was able to complete this medication from samples I provided her.  I did inform the patient if we need to repeat therapy we could potentially supply her with additional samples.  At this point I would like for her to try lactose free diet to see if her urgency improves.  I provided her with educational handout.      She can continue antispasmodics, fiber,  and occasional Imodium.  I did instruct the patient to change her probiotic every 6 months and recommended that she change to Culturelle or Florastor.  For now 3-month follow-up.   She can call with any questions or concerns in the interim.

## 2019-05-13 RX ORDER — FENOFIBRATE 145 MG/1
TABLET, COATED ORAL
Qty: 30 TABLET | Refills: 3 | Status: SHIPPED | OUTPATIENT
Start: 2019-05-13

## 2019-05-17 RX ORDER — MELOXICAM 7.5 MG/1
TABLET ORAL
Qty: 30 TABLET | Refills: 3 | Status: SHIPPED | OUTPATIENT
Start: 2019-05-17 | End: 2019-07-17

## 2019-05-20 RX ORDER — BUPROPION HYDROCHLORIDE 300 MG/1
300 TABLET ORAL DAILY
Qty: 90 TABLET | Refills: 1 | Status: SHIPPED | OUTPATIENT
Start: 2019-05-20

## 2019-05-21 ENCOUNTER — TELEPHONE (OUTPATIENT)
Dept: GASTROENTEROLOGY | Facility: CLINIC | Age: 69
End: 2019-05-21

## 2019-05-21 NOTE — TELEPHONE ENCOUNTER
----- Message from Kyler Molina sent at 5/21/2019  2:37 PM EDT -----  Regarding: pt called   Contact: [phone]  Asking for a nurse to give her a call back

## 2019-05-21 NOTE — TELEPHONE ENCOUNTER
Called pt and pt wanting to make appt with Dr Zavala.  Appt made for 05/23 at 1115am with Dr Zavala.

## 2019-05-22 DIAGNOSIS — E03.9 ACQUIRED HYPOTHYROIDISM: ICD-10-CM

## 2019-05-22 DIAGNOSIS — E78.2 MIXED HYPERLIPIDEMIA: ICD-10-CM

## 2019-05-22 DIAGNOSIS — E78.2 MIXED HYPERLIPIDEMIA: Primary | ICD-10-CM

## 2019-05-22 LAB
ALBUMIN SERPL-MCNC: 4.4 G/DL (ref 3.5–5.2)
ALBUMIN/GLOB SERPL: 2.4 G/DL
ALP SERPL-CCNC: 34 U/L (ref 39–117)
ALT SERPL-CCNC: 16 U/L (ref 1–33)
AST SERPL-CCNC: 13 U/L (ref 1–32)
BILIRUB SERPL-MCNC: 0.2 MG/DL (ref 0.2–1.2)
BUN SERPL-MCNC: 15 MG/DL (ref 8–23)
BUN/CREAT SERPL: 17.2 (ref 7–25)
CALCIUM SERPL-MCNC: 9.9 MG/DL (ref 8.6–10.5)
CHLORIDE SERPL-SCNC: 100 MMOL/L (ref 98–107)
CO2 SERPL-SCNC: 27.6 MMOL/L (ref 22–29)
CREAT SERPL-MCNC: 0.87 MG/DL (ref 0.57–1)
GLOBULIN SER CALC-MCNC: 1.8 GM/DL
GLUCOSE SERPL-MCNC: 99 MG/DL (ref 65–99)
POTASSIUM SERPL-SCNC: 4.4 MMOL/L (ref 3.5–5.2)
PROT SERPL-MCNC: 6.2 G/DL (ref 6–8.5)
SODIUM SERPL-SCNC: 140 MMOL/L (ref 136–145)
TSH SERPL DL<=0.005 MIU/L-ACNC: 2.26 MIU/ML (ref 0.27–4.2)

## 2019-05-23 ENCOUNTER — OFFICE VISIT (OUTPATIENT)
Dept: GASTROENTEROLOGY | Facility: CLINIC | Age: 69
End: 2019-05-23

## 2019-05-23 VITALS
DIASTOLIC BLOOD PRESSURE: 76 MMHG | TEMPERATURE: 98.1 F | HEIGHT: 61 IN | WEIGHT: 133 LBS | SYSTOLIC BLOOD PRESSURE: 132 MMHG | BODY MASS INDEX: 25.11 KG/M2

## 2019-05-23 DIAGNOSIS — K52.839 MICROSCOPIC COLITIS, UNSPECIFIED MICROSCOPIC COLITIS TYPE: Primary | ICD-10-CM

## 2019-05-23 DIAGNOSIS — K58.0 IRRITABLE BOWEL SYNDROME WITH DIARRHEA: ICD-10-CM

## 2019-05-23 PROCEDURE — 99213 OFFICE O/P EST LOW 20 MIN: CPT | Performed by: INTERNAL MEDICINE

## 2019-05-23 RX ORDER — BISMUTH SUBSALICYLATE 262 MG
786 TABLET,CHEWABLE ORAL DAILY
Qty: 90 TABLET | Refills: 3 | Status: SHIPPED | OUTPATIENT
Start: 2019-05-23 | End: 2019-07-17

## 2019-05-23 NOTE — PROGRESS NOTES
Chief Complaint   Patient presents with   • Irritable Bowel Syndrome       Quyen Jalloh is a  68 y.o. female here for a follow up visit for colitis, likely lymphocytic.     She has had improvement in her symptoms with Xifaxan in the past.  She was doing better but now symptoms have recurred.  She is now having pain and this is new.  She has to take imodium but this constipates her.    She has a history of colon polyps and is due for next colonoscopy in 2021.  Last c/s did show diffuse mild inflammatory change most suggestive of micfroscopic colitis.    She had been doing better after xifaxan but now all symptoms have recurred and she is limiting activities as a result.  Entocort was too expensive in the past as was repeat course of xifaxan.  No blood in stool.  She is forced to use imodium at times so she can leave the house but this constipates her - has not had bm in 3 days.  HPI  Past Medical History:   Diagnosis Date   • Bloating    • Depression    • Diverticulitis of colon 1/28/2016    Impression: 11/18/2015 - She had diverticulitis last year.  I think this is a mild flare compared to where she was last year.  Will go ahead and put her on Augmentin for 10 days.  Her WBC is normal now.  Impression: 04/10/2014 - Because of her blood in lucy stool, I am ordering the stat CT.  Will treat as outpt as long as there is not perf seen on CT.  I have put in Augmentin for TX so she doesn't have to use Flagyl.;    • Fibromyalgia     She had been folllowed by Dr. Heineke but he has retired.  She has been unable to find another FM specialist in the area.  She tried seeing Dr. Cordova, but this was a pain clinic and he pushed meds on her that she didn't want to take. She is not interested in narcotics for pain control.  I have agreed to continue her on her current meds and to fill out her disability forms when they come due.  H   • Gallstones    • Hyperlipidemia    • Leukocytosis    • Malignant melanoma of skin (CMS/HCC)     • Non-specific colitis 1/28/2016    Impression: 12/07/2015 - CT from [facility] reviewed.  There is some generalized edematous wall thickening in lucy entire rectosigmoid colon and some in descending colon.  She is tolerating the Augmentin at this point.  I would like for her to see a GI doctor to try to determine what kind of colitis we are dealing with.  She is free to advance her diet as tolerated and I think this will actually help bulk up her stools.  Her WBC has been normal through all of this.;    • Osteopenia 04/13/2015    She will due for dexa next year.[id number] Appointment   • Polyp of sigmoid colon     Due for colonoscopy.  She will contact Dr. Harry Atwood's office to set this up.   • Tubular adenoma of colon 07/31/2014    c-scope Dr. Ornelas - repeat scope 5  years     Past Surgical History:   Procedure Laterality Date   • CHOLECYSTECTOMY     • COLONOSCOPY  01/12/2016    Erythematous mucosa in the entire examined colon, diverticulosis in the sigmoid colon, non-bleeding internal hemorrhoids   • MALIGNANT SKIN LESION EXCISION  2006    legs; melanoma   • TUBAL ABDOMINAL LIGATION         Current Outpatient Medications:   •  buPROPion XL (WELLBUTRIN XL) 300 MG 24 hr tablet, Take 1 tablet by mouth Daily., Disp: 90 tablet, Rfl: 1  •  CALCIUM PO, Take by mouth., Disp: , Rfl:   •  Cholecalciferol (VITAMIN D-3) 1000 UNITS capsule, Take by mouth. Take as directed, Disp: , Rfl:   •  dicyclomine (BENTYL) 20 MG tablet, Take 1 tablet by mouth 4 (Four) Times a Day Before Meals & at Bedtime As Needed (diarrhea)., Disp: 180 tablet, Rfl: 2  •  Digestive Enzymes (DIGESTIVE ENZYME PO), Take  by mouth., Disp: , Rfl:   •  fenofibrate (TRICOR) 145 MG tablet, TAKE 1 TABLET BY MOUTH EVERY DAY, Disp: 30 tablet, Rfl: 3  •  levothyroxine (SYNTHROID, LEVOTHROID) 75 MCG tablet, Take 1 tablet by mouth Daily., Disp: 90 tablet, Rfl: 1  •  melatonin 5 MG tablet tablet, Take by mouth. Take as directed, Disp: , Rfl:   •  Probiotic Product  (PROBIOTIC PO), Take  by mouth., Disp: , Rfl:   •  traZODone (DESYREL) 50 MG tablet, TAKE 1 TABLET BY MOUTH EVERY DAY AT NIGHT, Disp: 90 tablet, Rfl: 1  •  B Complex Vitamins (VITAMIN B COMPLEX) capsule capsule, Take  by mouth Daily., Disp: , Rfl:   •  Bismuth 262 MG chewable tablet, Chew 3 tablets Daily., Disp: 90 tablet, Rfl: 3  •  meloxicam (MOBIC) 7.5 MG tablet, TAKE 1 TABLET BY MOUTH EVERY DAY, Disp: 30 tablet, Rfl: 3  •  pravastatin (PRAVACHOL) 10 MG tablet, Take 1 tablet by mouth Daily., Disp: 90 tablet, Rfl: 1  PRN Meds:.  Allergies   Allergen Reactions   • Tetracycline Myalgia     Caused abdominal pain 30 years ago per pt    • Tetracyclines & Related      Social History     Socioeconomic History   • Marital status:      Spouse name: Not on file   • Number of children: Not on file   • Years of education: Not on file   • Highest education level: Not on file   Tobacco Use   • Smoking status: Never Smoker   • Smokeless tobacco: Never Used   Substance and Sexual Activity   • Alcohol use: Yes     Comment: social drinker   • Drug use: No     Family History   Problem Relation Age of Onset   • Cancer Mother         lung   • Coronary artery disease Father    • Glaucoma Father    • Lung cancer Father    • Cancer Father         malignant neoplasm of trachea, bronchus and lung   • Stroke Father    • Heart disease Father      Review of Systems   Constitutional: Negative for appetite change and unexpected weight change.   Gastrointestinal: Positive for abdominal pain, constipation and diarrhea. Negative for blood in stool.   All other systems reviewed and are negative.    Vitals:    05/23/19 1110   BP: 132/76   Temp: 98.1 °F (36.7 °C)         05/23/19  1110   Weight: 60.3 kg (133 lb)     Physical Exam   Constitutional: She appears well-developed and well-nourished.   HENT:   Head: Normocephalic and atraumatic.   Eyes: No scleral icterus.   Abdominal: Soft. She exhibits no distension and no mass. There is  tenderness.   Mild diffuse tenderness   Neurological: She is alert.   Skin: Skin is warm and dry.   Psychiatric: She has a normal mood and affect.     No images are attached to the encounter.  Diagnoses and all orders for this visit:    Microscopic colitis, unspecified microscopic colitis type    Irritable bowel syndrome with diarrhea    Other orders  -     Digestive Enzymes (DIGESTIVE ENZYME PO); Take  by mouth.  -     Bismuth 262 MG chewable tablet; Chew 3 tablets Daily.         - not improving with measures for ibs - may be the microscopic colitis componeny that is causing current distress - cannot afford entocort so will try low dose bismuth  - advised to try to avoid antidiarrheals/laxatives to avoid significant swings in symptoms  - daily benefiber (start every other day)  - add bismuth 3 pills each morning  - add morning dicyclomine if needed  - call in 3-4 weeks if not improving - may consider trial of mesalamine compound

## 2019-05-23 NOTE — PATIENT INSTRUCTIONS
- daily benefiber (start every other day)  - add bismuth 3 pills each morning  - add morning dicyclomine if needed  - call in 3-4 weeks if needed

## 2019-05-29 ENCOUNTER — TELEPHONE (OUTPATIENT)
Dept: GASTROENTEROLOGY | Facility: CLINIC | Age: 69
End: 2019-05-29

## 2019-05-29 NOTE — TELEPHONE ENCOUNTER
She can take this-if anything it may have lost some of its effectiveness.  Take 1 a day for the duration of her remaining pills.  I would recommend continuing the Pepto-Bismol

## 2019-05-29 NOTE — TELEPHONE ENCOUNTER
Called pt and pt reports that she has been following the regimen that Dr Zavala and her discussed ( pepto,  benfiber every other day, and dicyclomine).  Pt however reports that she did find an older script of entocort.3mg.  Pt states she has enough for 2 wks, but the script is dated 2016( bottle states to discard after 2017 ).  Pt is asking if she can take this and if so would she take it in combination with the pepto.      Advised pt that this script may be too old but will send message to Dr Zavala. Pt verb understanding.

## 2019-05-29 NOTE — TELEPHONE ENCOUNTER
Called pt and advised per Dr Zavala that she can take this if anything it may have lost some of its effectiveness.  Advised to take one a day for the duration of her remaining pills.  She recommends to continue the pepto bismol. Pt verb understanding.

## 2019-05-29 NOTE — TELEPHONE ENCOUNTER
----- Message from Lila Rogers sent at 5/29/2019  2:58 PM EDT -----  Regarding: CALL BACK   Contact: [phone]  PT IS ASKING FOR AN CALL BACK

## 2019-06-04 DIAGNOSIS — F51.01 PRIMARY INSOMNIA: ICD-10-CM

## 2019-06-04 RX ORDER — LEVOTHYROXINE SODIUM 0.07 MG/1
TABLET ORAL
Qty: 90 TABLET | Refills: 1 | Status: SHIPPED | OUTPATIENT
Start: 2019-06-04 | End: 2020-07-02 | Stop reason: ALTCHOICE

## 2019-06-04 RX ORDER — TRAZODONE HYDROCHLORIDE 50 MG/1
TABLET ORAL
Qty: 90 TABLET | Refills: 0 | Status: SHIPPED | OUTPATIENT
Start: 2019-06-04 | End: 2019-08-29 | Stop reason: SDUPTHER

## 2019-07-17 ENCOUNTER — OFFICE VISIT (OUTPATIENT)
Dept: GASTROENTEROLOGY | Facility: CLINIC | Age: 69
End: 2019-07-17

## 2019-07-17 VITALS
TEMPERATURE: 98.1 F | DIASTOLIC BLOOD PRESSURE: 74 MMHG | WEIGHT: 132.6 LBS | HEIGHT: 61 IN | BODY MASS INDEX: 25.04 KG/M2 | SYSTOLIC BLOOD PRESSURE: 126 MMHG

## 2019-07-17 DIAGNOSIS — K52.839 MICROSCOPIC COLITIS, UNSPECIFIED MICROSCOPIC COLITIS TYPE: ICD-10-CM

## 2019-07-17 DIAGNOSIS — R19.7 DIARRHEA, UNSPECIFIED TYPE: Primary | ICD-10-CM

## 2019-07-17 DIAGNOSIS — D12.6 ADENOMATOUS POLYP OF COLON, UNSPECIFIED PART OF COLON: ICD-10-CM

## 2019-07-17 PROCEDURE — 99214 OFFICE O/P EST MOD 30 MIN: CPT | Performed by: NURSE PRACTITIONER

## 2019-07-17 NOTE — PROGRESS NOTES
Chief Complaint   Patient presents with   • Irritable Bowel Syndrome       Quyen Jalloh is a  69 y.o. female here for a follow up visit for diarrhea.     HPI  70 yo f presents today for follow up visit for diarrhea and fecal urgency. She is a patient of Dr. Zavala. She was last seen in the office on 5/23/19. She has hx microscopic colitis and is happy to report the two week worth of out dated Entocort and a month and half of pepto-bismol with alternating days of benefiber have resolved her diarrhea. She admits she has been diarrhea free for over two weeks. She denies any dysphagia, reflux, abd pain, N&V, diarrhea, constipation, rectal bleeding or melena. She admits her appetite is good and her weight is stable. She does have hx adenomatous colon polyps and her next colonoscopy will be due 2021.         Past Medical History:   Diagnosis Date   • Bloating    • Depression    • Diverticulitis of colon 1/28/2016    Impression: 11/18/2015 - She had diverticulitis last year.  I think this is a mild flare compared to where she was last year.  Will go ahead and put her on Augmentin for 10 days.  Her WBC is normal now.  Impression: 04/10/2014 - Because of her blood in lucy stool, I am ordering the stat CT.  Will treat as outpt as long as there is not perf seen on CT.  I have put in Augmentin for TX so she doesn't have to use Flagyl.;    • Fibromyalgia     She had been folllowed by Dr. Heineke but he has retired.  She has been unable to find another FM specialist in the area.  She tried seeing Dr. Cordova, but this was a pain clinic and he pushed meds on her that she didn't want to take. She is not interested in narcotics for pain control.  I have agreed to continue her on her current meds and to fill out her disability forms when they come due.  H   • Gallstones    • Hyperlipidemia    • Leukocytosis    • Malignant melanoma of skin (CMS/HCC)    • Non-specific colitis 1/28/2016    Impression: 12/07/2015 - CT from [facility]  reviewed.  There is some generalized edematous wall thickening in lucy entire rectosigmoid colon and some in descending colon.  She is tolerating the Augmentin at this point.  I would like for her to see a GI doctor to try to determine what kind of colitis we are dealing with.  She is free to advance her diet as tolerated and I think this will actually help bulk up her stools.  Her WBC has been normal through all of this.;    • Osteopenia 04/13/2015    She will due for dexa next year.[id number] Appointment   • Polyp of sigmoid colon     Due for colonoscopy.  She will contact Dr. Harry Atwood's office to set this up.   • Tubular adenoma of colon 07/31/2014    c-scope Dr. Ornelas - repeat scope 5  years       Past Surgical History:   Procedure Laterality Date   • CHOLECYSTECTOMY     • COLONOSCOPY  01/12/2016    Erythematous mucosa in the entire examined colon, diverticulosis in the sigmoid colon, non-bleeding internal hemorrhoids   • MALIGNANT SKIN LESION EXCISION  2006    legs; melanoma   • TUBAL ABDOMINAL LIGATION         Scheduled Meds:    Continuous Infusions:  No current facility-administered medications for this visit.     PRN Meds:.    Allergies   Allergen Reactions   • Tetracycline Myalgia     Caused abdominal pain 30 years ago per pt    • Tetracyclines & Related        Social History     Socioeconomic History   • Marital status:      Spouse name: Not on file   • Number of children: Not on file   • Years of education: Not on file   • Highest education level: Not on file   Tobacco Use   • Smoking status: Never Smoker   • Smokeless tobacco: Never Used   Substance and Sexual Activity   • Alcohol use: Yes     Comment: social drinker   • Drug use: No       Family History   Problem Relation Age of Onset   • Cancer Mother         lung   • Coronary artery disease Father    • Glaucoma Father    • Lung cancer Father    • Cancer Father         malignant neoplasm of trachea, bronchus and lung   • Stroke Father    •  Heart disease Father        Review of Systems   Constitutional: Negative for appetite change, chills, diaphoresis, fatigue, fever and unexpected weight change.   HENT: Negative for nosebleeds, postnasal drip, sore throat, trouble swallowing and voice change.    Respiratory: Negative for cough, choking, chest tightness, shortness of breath and wheezing.    Cardiovascular: Negative for chest pain, palpitations and leg swelling.   Gastrointestinal: Negative for abdominal distention, abdominal pain, anal bleeding, blood in stool, constipation, diarrhea, nausea, rectal pain and vomiting.   Endocrine: Negative for polydipsia, polyphagia and polyuria.   Musculoskeletal: Positive for back pain. Negative for gait problem.   Skin: Negative for rash and wound.   Allergic/Immunologic: Negative for food allergies.   Neurological: Negative for dizziness, speech difficulty and light-headedness.   Psychiatric/Behavioral: Negative for confusion, self-injury, sleep disturbance and suicidal ideas.       Vitals:    07/17/19 1302   BP: 126/74   Temp: 98.1 °F (36.7 °C)       Physical Exam   Constitutional: She is oriented to person, place, and time. She appears well-developed and well-nourished. She does not appear ill. No distress.   HENT:   Head: Normocephalic.   Eyes: Pupils are equal, round, and reactive to light.   Cardiovascular: Normal rate, regular rhythm and normal heart sounds.   Pulmonary/Chest: Effort normal and breath sounds normal.   Abdominal: Soft. Bowel sounds are normal. She exhibits no distension and no mass. There is no hepatosplenomegaly. There is no tenderness. There is no rebound and no guarding. No hernia.   Musculoskeletal: Normal range of motion.   Neurological: She is alert and oriented to person, place, and time.   Skin: Skin is warm and dry.   Psychiatric: She has a normal mood and affect. Her speech is normal and behavior is normal. Judgment normal.       No images are attached to the  encounter.    Assessment & Plan    1. Diarrhea, unspecified type    2. Microscopic colitis, unspecified microscopic colitis type    3. Adenomatous polyp of colon, unspecified part of colon    Diarrhea and fecal urgency have resolved. Patient's bowels are moving well now. Continue every other day benefiber for now. Continue daily probiotic. Call office with any issues. Follow up with Dr. Zavala in 3-4 months. Next colonoscopy due 2021. Continue to avoid PO NSAIDs. Can trial some voltaren gel per PCP for FMS pain.

## 2019-07-18 ENCOUNTER — TELEPHONE (OUTPATIENT)
Dept: GASTROENTEROLOGY | Facility: CLINIC | Age: 69
End: 2019-07-18

## 2019-07-18 NOTE — TELEPHONE ENCOUNTER
Would have her get this from her PCP when she gets a new one. In the mean time would trial Salonpas or Biofreeze topical gel OTC for her pain. Thanks.

## 2019-07-18 NOTE — TELEPHONE ENCOUNTER
----- Message from Lila Rogers sent at 7/18/2019 11:25 AM EDT -----  Regarding: PAIN GEL   Contact: [phone]  PT IS ASKING FOR PAIN GEL FOR HER BACK AND WAS IN THE OFFICE YESTERDAY

## 2019-07-18 NOTE — TELEPHONE ENCOUNTER
Call to pt.  Advise per GEMINI Marshall that would get t his from PCP when gets a new one.  In the meantime, would trial salonpas or biofreeze topical gel OTC for pain.  Verb understanding.

## 2019-08-28 ENCOUNTER — HOSPITAL ENCOUNTER (OUTPATIENT)
Dept: GENERAL RADIOLOGY | Facility: HOSPITAL | Age: 69
Discharge: HOME | End: 2019-08-28
Admitting: INTERNAL MEDICINE

## 2019-08-28 DIAGNOSIS — M54.2 CERVICALGIA: ICD-10-CM

## 2019-08-28 PROCEDURE — 72050 X-RAY EXAM NECK SPINE 4/5VWS: CPT

## 2019-08-29 ENCOUNTER — TELEPHONE (OUTPATIENT)
Dept: GASTROENTEROLOGY | Facility: CLINIC | Age: 69
End: 2019-08-29

## 2019-08-29 DIAGNOSIS — F51.01 PRIMARY INSOMNIA: ICD-10-CM

## 2019-08-29 RX ORDER — TRAZODONE HYDROCHLORIDE 50 MG/1
50 TABLET ORAL
Qty: 30 TABLET | Refills: 0 | Status: SHIPPED | OUTPATIENT
Start: 2019-08-29

## 2019-08-29 NOTE — TELEPHONE ENCOUNTER
----- Message from Claudia Kincaid sent at 8/29/2019  1:28 PM EDT -----  Regarding: questions for  the nurse  Contact: [phone]  Pt said she is constipated and would like for a nurse to call back

## 2019-08-29 NOTE — TELEPHONE ENCOUNTER
Great news.     Continue the fiber but add a daily miralax. If it causes diarrhea then take it every other day or even every 3rd day. Call next week with update. Thanks.

## 2019-08-29 NOTE — TELEPHONE ENCOUNTER
Call to pt.  States pepto for 6 weeks cleared up IBS-D.  Now struggling with constipation.  Has added benefiber and tried to increase dietary fiber and water.  Having BM every 2-3 days.  This causes discomfort.    Asking how much benefiber to take/day or if anything else need to be added to better manage stool pattern.    Update/question to GEMINI Hill

## 2019-08-29 NOTE — TELEPHONE ENCOUNTER
Called pt and advised per Annemarie great news.  She recommends to continue the fiber but add daily miralax. If it causes diarrhea then take it every other day or even every 3 rd day.  Advised to call next week with update. Pt verb understanding.

## 2019-09-05 ENCOUNTER — TRANSCRIBE ORDERS (OUTPATIENT)
Dept: PHYSICAL THERAPY | Facility: HOSPITAL | Age: 69
End: 2019-09-05

## 2019-09-05 DIAGNOSIS — M50.30 DDD (DEGENERATIVE DISC DISEASE), CERVICAL: Primary | ICD-10-CM

## 2019-09-20 ENCOUNTER — APPOINTMENT (OUTPATIENT)
Dept: WOMENS IMAGING | Facility: HOSPITAL | Age: 69
End: 2019-09-20

## 2019-09-20 ENCOUNTER — HOSPITAL ENCOUNTER (OUTPATIENT)
Dept: PHYSICAL THERAPY | Facility: HOSPITAL | Age: 69
Setting detail: THERAPIES SERIES
Discharge: HOME | End: 2019-09-20

## 2019-09-20 DIAGNOSIS — M54.2 NECK PAIN: Primary | ICD-10-CM

## 2019-09-20 PROCEDURE — 97110 THERAPEUTIC EXERCISES: CPT | Performed by: PHYSICAL THERAPIST

## 2019-09-20 PROCEDURE — 77063 BREAST TOMOSYNTHESIS BI: CPT | Performed by: RADIOLOGY

## 2019-09-20 PROCEDURE — 97162 PT EVAL MOD COMPLEX 30 MIN: CPT | Performed by: PHYSICAL THERAPIST

## 2019-09-20 PROCEDURE — 97140 MANUAL THERAPY 1/> REGIONS: CPT | Performed by: PHYSICAL THERAPIST

## 2019-09-20 PROCEDURE — 77067 SCR MAMMO BI INCL CAD: CPT | Performed by: RADIOLOGY

## 2019-09-20 NOTE — THERAPY EVALUATION
Outpatient Physical Therapy Ortho Initial Evaluation  [facility]     Patient Name: Quyen Jalloh  : 1950  MRN: [id number]  Today's Date: 2019      Visit Date: 2019    Patient Active Problem List   Diagnosis   • Adrenal cortical adenoma   • Decreased hearing   • Fatigue   • Hyperlipidemia   • Hypothyroidism   • Insomnia   • Seasonal allergic rhinitis   • Clicking shoulder   • Shoulder pain   • Vitamin D deficiency   • Osteopenia   • Fibromyalgia   • Neck pain   • Microscopic colitis   • Adenomatous polyp of colon        Past Medical History:   Diagnosis Date   • Bloating    • Depression    • Diverticulitis of colon 2016    Impression: 2015 - She had diverticulitis last year.  I think this is a mild flare compared to where she was last year.  Will go ahead and put her on Augmentin for 10 days.  Her WBC is normal now.  Impression: 04/10/2014 - Because of her blood in lucy stool, I am ordering the stat CT.  Will treat as outpt as long as there is not perf seen on CT.  I have put in Augmentin for TX so she doesn't have to use Flagyl.;    • Fibromyalgia     She had been folllowed by Dr. Heineke but he has retired.  She has been unable to find another FM specialist in the area.  She tried seeing Dr. Cordova, but this was a pain clinic and he pushed meds on her that she didn't want to take. She is not interested in narcotics for pain control.  I have agreed to continue her on her current meds and to fill out her disability forms when they come due.  H   • Gallstones    • Hyperlipidemia    • Leukocytosis    • Malignant melanoma of skin (CMS/HCC)    • Non-specific colitis 2016    Impression: 2015 - CT from [facility] reviewed.  There is some generalized edematous wall thickening in lucy entire rectosigmoid colon and some in descending colon.  She is tolerating the Augmentin at this point.  I would like for her to see a GI doctor to try to determine what kind of colitis we are  dealing with.  She is free to advance her diet as tolerated and I think this will actually help bulk up her stools.  Her WBC has been normal through all of this.;    • Osteopenia 04/13/2015    She will due for dexa next year.[id number] Appointment   • Polyp of sigmoid colon     Due for colonoscopy.  She will contact Dr. Harry Atwood's office to set this up.   • Tubular adenoma of colon 07/31/2014    c-scope Dr. Ornelas - repeat scope 5  years        Past Surgical History:   Procedure Laterality Date   • CHOLECYSTECTOMY     • COLONOSCOPY  01/12/2016    Erythematous mucosa in the entire examined colon, diverticulosis in the sigmoid colon, non-bleeding internal hemorrhoids   • MALIGNANT SKIN LESION EXCISION  2006    legs; melanoma   • TUBAL ABDOMINAL LIGATION         Visit Dx:     ICD-10-CM ICD-9-CM   1. Neck pain M54.2 723.1         Patient History     Row Name 09/20/19 1300             History    Chief Complaint  Pain  -RA      Type of Pain  Neck pain  -RA      Date Current Problem(s) Began  09/05/19 MD referral date  -RA      Brief Description of Current Complaint  Reports years and years of neck pain that has progressed to the point where she finally cried uncle, had imaging done - +DDD, stenosis, osteophytes  -RA      Previous treatment for THIS PROBLEM  Other (comment);Medication;Massage myofasial level soft tissue work, accupuncture  -RA      Patient/Caregiver Goals  Relieve pain;Improve mobility;Know what to do to help the symptoms  -RA      Hand Dominance  right-handed  -RA      Occupation/sports/leisure activities  retired, former teaching,   -RA      Patient seeing anyone else for problem(s)?  PCP  -RA      How has patient tried to help current problem?  Tylenol (takes edge off), [place], icy hot, heating pad (can't take Advil), Trazadone at night, Tumeric for inflammation, does yoga  -RA      What clinical tests have you had for this problem?  MRI;X-ray  -RA      Results of Clinical Tests   +DDD, stenosis, osteophytes C4-C7  -RA         Pain     Pain Location  Neck  -RA      Pain at Present  7  -RA      Pain at Best  6  -RA      Pain at Worst  8  -RA      Pain Frequency  Constant/continuous;Intermittent  -RA      Pain Description  Aching;Discomfort;Nagging;Spasm  -RA      What Performance Factors Make the Current Problem(s) WORSE?  worse in am and pm, turning head, sleeping at night (difficulty sleeping), lifting/carrying objects  -RA      What Performance Factors Make the Current Problem(s) BETTER?  nothing seems to help  -RA      Is your sleep disturbed?  Yes  -RA      Is medication used to assist with sleep?  Yes Trazadone  -RA      Total hours of sleep per night  wakes intermittently  -RA      What position do you sleep in?  Right sidelying;Left sidelying  -RA      Difficulties at work?  retired  -RA      Difficulties with ADL's?  sometimes putting on pullover, turning head with driving  -RA      Difficulties with recreational activities?  reading, volunteer work, walking, yardwork/gardening  -RA         Fall Risk Assessment    Any falls in the past year:  No  -RA         Daily Activities    Primary Language  English  -RA      Are you able to read  Yes  -RA      Are you able to write  Yes  -RA      How does patient learn best?  Reading;Listening  -RA      Teaching needs identified  Home Exercise Program;Management of Condition  -RA      Patient is concerned about/has problems with  Repetitive movements of the hand, arm, shoulder;Reaching over head;Performing home management (household chores, shopping, care of dependents);Grasping objects lifting;Difficulty with self care (i.e. bathing, dressing, toileting: pain  -RA      Does patient have problems with the following?  Depression  -RA      Barriers to learning  None  -RA      Explanation of Functional Status Problem  independent with pain and/or modification   -RA      Pt Participated in POC and Goals  Yes  -RA         Safety    Are you being hurt,  hit, or frightened by anyone at home or in your life?  No  -RA      Are you being neglected by a caregiver  No  -RA        User Key  (r) = Recorded By, (t) = Taken By, (c) = Cosigned By    Initials Name Provider Type    Azra Cross, PT Physical Therapist          PT Ortho     Row Name 09/20/19 1300       Posture/Observations    Posture/Observations Comments  FH/rounded shoulders, deecreased cervical lordosis    -RA       Myotomal Screen- Upper Quarter Clearing    Shoulder flexion (C5)  4 (Good)  -RA    Elbow flexion/wrist extension (C6)  4 (Good);4+ (Good +)  -RA    Elbow extension/wrist flexion (C7)  4 (Good);4+ (Good +)  -RA       Cervical/Shoulder ROM Screen    Cervical flexion  Normal feels tug in different place  -RA    Cervical extension  Impaired 75% with pain centrally down into shoulder blades  -RA    Cervical lateral flexion  Impaired R 15-20%, L grossly 25%, pain R>L  -RA    Cervical rotation  Impaired L 40%, R 50% pain L>R  -RA    Shoulder elevation   Impaired grossly 70-75%  -RA       Special Tests/Palpation    Special Tests/Palpation  Cervical/Thoracic  -RA       Cervical Palpation    Cervical Palpation- Location?  -- taut/tender R>L cer pvm/SCM/UT/levator tissues (trigger pts)  -RA       Cervical Accessory Motions    Cervical Accessory Motions Tested?  Yes hypomobility   -RA       Thoracic Accessory Motions    Thoracic Accessory Motions Tested?  Yes hypomobility  -RA       Rib Mobility    Rib Mobility Accessory Motions Tested?  Yes stiffness  -RA       General ROM    GENERAL ROM COMMENTS  functional shoulder ROM B   -RA       MMT (Manual Muscle Testing)    General MMT Comments  B shoulder strength grossly 4/5, L mildly weaker than R with MMT  -RA       Sensation    Light Touch  No apparent deficits  -RA       Flexibility    Flexibility Tested?  Upper Extremity  -RA       Upper Extremity Flexibility    UE Flexibility Comments  tightness of pecs, lats, UT, levator tissues  -RA        Pathomechanics    Spine Pathomechanics  Limited upper thoracic motion with cervical ROM;Limited opposite AA rotation with cervical sidebending  -RA      User Key  (r) = Recorded By, (t) = Taken By, (c) = Cosigned By    Initials Name Provider Type    Azra Cross, PT Physical Therapist                      Therapy Education  Given: HEP, Posture/body mechanics, Symptoms/condition management  Program: New  How Provided: Verbal, Demonstration  Provided to: Patient  Level of Understanding: Teach back education performed, Verbalized            PT OP Goals   09/20/19 1300  PT Short Term Goals  STG 1 Patient will be independent with initial HEP for posture and ROM/flexibility without increased symptoms  STG 2 Patient will be educated on and demonstrate knowledge of optimal posture, decompression techniques, stress management/relaxation strategies, ergonomics, good body mechanics, and proper lifting techniques to minimize stress to spine and soft tissues with daily function  STG 3 Patient will report >/= 25% increased ease/decreased pain with daily functional activities  Long Term Goals  LTG 1 Patient will be independent with established HEP for strength/stabilization to aid in self management of symptoms/condition  LTG 2 Patient will have >/= 50% cervical AROM with minimal pain for safety and increased ease with driving.  LTG 3 Patient will have postural/shoulder girdle strength at least 4/5 for increased stability/tolerance with home/yard management   LTG 4 Patient will have pain </= 5/10 with performance of household/community/leisure/recreational activities.  LTG 5 Patient will report >/= 50% improvement in quality/quantity of sleep   Time Calculation  PT Goal Re-Cert Due Date 12/18/19             PT Assessment/Plan     Row Name 09/20/19 1300          PT Assessment    Functional Limitations  Limitations in community activities;Performance in leisure activities;Limitation in home management  -RA     Impairments   Impaired flexibility;Muscle strength;Pain;Posture;Range of motion  -RA     Assessment Comments  Patient is a 68yo F R handed F referred to therapy for evolving condition of chronic neck/shoulder blade pain.  Her pain is reported as constant and affecting all facets of daily life.  She has comorbidities/personal factors including but not limited to chronicity of condition, hx of L frozen shoulder, R shoulder issues (reports floating debris shoulder “catches” at times), FM, and reported inability to take NSAIDS that may impact her therapy plan of care.  She demonstrates fair posture with FH/rounded shoulders and decreased cervical lordosis, limited/painful cervical ROM, functional shoulder ROM B (no increased pain), decreased postural/cervical/shoulder girdle strength/stabilization, tightness of SCM, UT, levator, lat, pec tissues, and taut/tender tissues with palpable trigger points in R>L SCM/UT/levator/interscapular tissues.  She will benefit from skilled PT for education, ROM/flexibility, strength/stabilization, and manual/modalities prn to help reduce pain, improve mobility/functional activity tolerance, and facilitate independent self-management of symptoms/condition.   -RA     Please refer to paper survey for additional self-reported information  Yes  -RA     Rehab Potential  Good  -RA     Patient/caregiver participated in establishment of treatment plan and goals  Yes  -RA     Patient would benefit from skilled therapy intervention  Yes  -RA        PT Plan    PT Frequency  2x/week  -RA     Predicted Duration of Therapy Intervention (Therapy Eval)  30-90 days  -RA     Planned CPT's?  PT EVAL MOD COMPLELITY: 24336;PT THER PROC EA 15 MIN: 04834;PT MANUAL THERAPY EA 15 MIN: 72074;PT NEUROMUSC RE-EDUCATION EA 15 MIN: 57638;PT SELF CARE/HOME MGMT/TRAIN EA 15: 87519;PT HOT OR COLD PACK TREAT MCARE;PT ELECTRICAL STIM UNATTEND:   -RA     PT Plan Comments  Skilled PT for education, ROM/flexibility,  strength/stabilization, and manual/modalities prn.  Consider warm up on UBE, postural ex (TB rows, extension, B ER, HA), pec stretch  -RA       User Key  (r) = Recorded By, (t) = Taken By, (c) = Cosigned By    Initials Name Provider Type    Azra Cross, PT Physical Therapist          Modalities     Row Name 09/20/19 1700             Moist Heat    MH Applied  Yes  -RA      Location  cervical/thoracic/lumbar spine in supine decompression (legs over traction stool).  -RA      Rx Minutes  10 mins  -RA      MH S/P Rx  Yes  -RA        User Key  (r) = Recorded By, (t) = Taken By, (c) = Cosigned By    Initials Name Provider Type    Azra Cross, PT Physical Therapist        OP Exercises     Row Name 09/20/19 1700             Total Minutes     20  -RA      45601 - PT Therapeutic Exercise Minutes  Instructed in initial HEP including reverse shoulder rolls, scap retraction, chin tuck, cervical rotation supine and decompression x 10 minutes (while on heat).        User Key  (r) = Recorded By, (t) = Taken By, (c) = Cosigned By    Initials Name Provider Type    Azra Cross, PT Physical Therapist           Manual Rx (last 36 hours)      Manual Treatments     Row Name 09/20/19 1700             Total Minutes    12729 - PT Manual Therapy Minutes  12  -RA         Manual Rx 1    Manual Rx 1 Location  Cervical region   -RA      Manual Rx 1 Type  STM to suboccipitals, UT, levator tissues, gentle up/down glides, passive pec/UT stretching   -RA        User Key  (r) = Recorded By, (t) = Taken By, (c) = Cosigned By    Initials Name Provider Type    Azra Cross, PT Physical Therapist                      Outcome Measure Options: (Patient given CHIP (incomplete) instead of NDI - next visit)         Time Calculation:     Start Time: 1315  Stop Time: 1400  Time Calculation (min): 45 min         PT G-Codes  Outcome Measure Options: (Patient given CHIP (incomplete) instead of NDI - next visit)         Azra Crawford  PT  9/21/2019

## 2019-09-27 ENCOUNTER — APPOINTMENT (OUTPATIENT)
Dept: PHYSICAL THERAPY | Facility: HOSPITAL | Age: 69
End: 2019-09-27

## 2019-10-01 ENCOUNTER — HOSPITAL ENCOUNTER (OUTPATIENT)
Dept: PHYSICAL THERAPY | Facility: HOSPITAL | Age: 69
Setting detail: THERAPIES SERIES
Discharge: HOME | End: 2019-10-01

## 2019-10-01 DIAGNOSIS — M54.2 NECK PAIN: Primary | ICD-10-CM

## 2019-10-01 PROCEDURE — 97140 MANUAL THERAPY 1/> REGIONS: CPT | Performed by: PHYSICAL THERAPIST

## 2019-10-01 PROCEDURE — 97110 THERAPEUTIC EXERCISES: CPT | Performed by: PHYSICAL THERAPIST

## 2019-10-01 NOTE — THERAPY TREATMENT NOTE
Outpatient Physical Therapy Ortho Treatment Note  [facility]     Patient Name: Quyen Jalloh  : 1950  MRN: [id number]  Today's Date: 10/1/2019      Visit Date: 10/01/2019    Visit Dx:    ICD-10-CM ICD-9-CM   1. Neck pain M54.2 723.1       Patient Active Problem List   Diagnosis   • Adrenal cortical adenoma   • Decreased hearing   • Fatigue   • Hyperlipidemia   • Hypothyroidism   • Insomnia   • Seasonal allergic rhinitis   • Clicking shoulder   • Shoulder pain   • Vitamin D deficiency   • Osteopenia   • Fibromyalgia   • Neck pain   • Microscopic colitis   • Adenomatous polyp of colon        Past Medical History:   Diagnosis Date   • Bloating    • Depression    • Diverticulitis of colon 2016    Impression: 2015 - She had diverticulitis last year.  I think this is a mild flare compared to where she was last year.  Will go ahead and put her on Augmentin for 10 days.  Her WBC is normal now.  Impression: 04/10/2014 - Because of her blood in lucy stool, I am ordering the stat CT.  Will treat as outpt as long as there is not perf seen on CT.  I have put in Augmentin for TX so she doesn't have to use Flagyl.;    • Fibromyalgia     She had been folllowed by Dr. Heineke but he has retired.  She has been unable to find another FM specialist in the area.  She tried seeing Dr. Cordova, but this was a pain clinic and he pushed meds on her that she didn't want to take. She is not interested in narcotics for pain control.  I have agreed to continue her on her current meds and to fill out her disability forms when they come due.  H   • Gallstones    • Hyperlipidemia    • Leukocytosis    • Malignant melanoma of skin (CMS/HCC)    • Non-specific colitis 2016    Impression: 2015 - CT from [facility].  There is some generalized edematous wall thickening in lucy entire rectosigmoid colon and some in descending colon.  She is tolerating the Augmentin at this point.  I would like for her to see a  "GI doctor to try to determine what kind of colitis we are dealing with.  She is free to advance her diet as tolerated and I think this will actually help bulk up her stools.  Her WBC has been normal through all of this.;    • Osteopenia 04/13/2015    She will due for dexa next year.[id number] Appointment   • Polyp of sigmoid colon     Due for colonoscopy.  She will contact Dr. Harry Atwood's office to set this up.   • Tubular adenoma of colon 07/31/2014    c-scope Dr. Ornelas - repeat scope 5  years        Past Surgical History:   Procedure Laterality Date   • CHOLECYSTECTOMY     • COLONOSCOPY  01/12/2016    Erythematous mucosa in the entire examined colon, diverticulosis in the sigmoid colon, non-bleeding internal hemorrhoids   • MALIGNANT SKIN LESION EXCISION  2006    legs; melanoma   • TUBAL ABDOMINAL LIGATION                         PT Assessment/Plan     Row Name 10/01/19 3346          PT Assessment    Assessment Comments  Patient reports exacerbation of neck/shoulder pain for 3-4 days following initial therapy visit.  She indicates she's been having more issues with R shoulder \"catching\" and feels like the shoulder blade squeeze ex is aggravating her shoulder.  Introduced UBE L1 this visit and continued with initial ex changing to supine submaximal scap retraction (shoulder blade imprint on mat).  Repeated manual with GENTLE STM/MFR, GENTLE passive stretching, and GENTLE cervical distraction to address muscle tension and taut/tender tissues.   -RA        PT Plan    PT Plan Comments  Assess response to ex modification and gentle manual techniques.  Continue with education on condition and activity modification, gradually progress ther ex for ROM/flexibility and strength/stabilization, and repeat manual if beneficial.  Consider cervical isometrics, pec stretch (doorway or over noodle), and postural ex (TB rows, extension, B ER, HA) as patient tolerates.   -RA       User Key  (r) = Recorded By, (t) = Taken By, (c) " "= Cosigned By    Initials Name Provider Type    Azra Cross, PT Physical Therapist          Modalities     Row Name 10/01/19 1600             Moist Heat    MH Applied  Yes  -RA      Location  cervical/thoracic spine in supine decompression (legs over over bolsterl).  -RA      Rx Minutes  15 mins  -RA      MH S/P Rx  Yes  -RA        User Key  (r) = Recorded By, (t) = Taken By, (c) = Cosigned By    Initials Name Provider Type    Azra Cross, PT Physical Therapist        OP Exercises     Row Name 10/01/19 1700 10/01/19 1600          Subjective Comments    Subjective Comments  --  Had increased pain in neck/shoulders for 3-4 days after I was here last time  -RA        Subjective Pain    Able to rate subjective pain?  --  yes  -RA     Pre-Treatment Pain Level  --  6  -RA        Total Minutes    77399 - PT Therapeutic Exercise Minutes  --  10  -RA     57621 - PT Manual Therapy Minutes  30  -RA  --        Exercise 1    Exercise Name 1  --  supine chin tucks  -RA     Reps 1  --  10  -RA     Time 1  --  5s  -RA        Exercise 2    Exercise Name 2  --  supine cervical rotation   -RA     Reps 2  --  10  -RA     Time 2  --  5s  -RA        Exercise 3    Exercise Name 3  --  supine scap ret 50% intensity (\"light shoulder blade imprint\")  -RA     Reps 3  --  10  -RA     Time 3  --  5s  -RA     Additional Comments  --  held reverse shoulder rolls for now  -RA        Exercise 4    Exercise Name 4  --  UBE L1  -RA     Time 4  --  3 min  -RA        Exercise 5    Exercise Name 5  --  --  -RA     Time 5  --  --  -RA       User Key  (r) = Recorded By, (t) = Taken By, (c) = Cosigned By    Initials Name Provider Type    Azra Cross, PT Physical Therapist                      Manual Rx (last 36 hours)      Manual Treatments     Row Name 10/01/19 1700             Total Minutes    70739 - PT Manual Therapy Minutes  30  -RA         Manual Rx 1    Manual Rx 1 Location  Cervical region   -RA      Manual Rx 1 Type  Gentle " STM/MFR to suboccipitals, cervical paraspinals, UT, levator tissues, decompressive stroking, gentle cervical distraction; gentle passive UT/levator stretching  -KAUSHAL        User Key  (r) = Recorded By, (t) = Taken By, (c) = Cosigned By    Initials Name Provider Type    Azra Cross PT Physical Therapist              Therapy Education  Given: HEP, Posture/body mechanics, Symptoms/condition management  Program: Reinforced  How Provided: Verbal, Demonstration  Provided to: Patient  Level of Understanding: Teach back education performed, Verbalized    Outcome Measure Options: Neck Disability Index (NDI)  Neck Disability Index  Section 1 - Pain Intensity: The pain is moderate at the moment.  Section 2 - Personal Care: I can look after myself normally, but it causes extra pain.  Section 3 - Lifting: I can lift only very light weights.  Section 4 - Work: I can do most of my usual work, but no more  Section 5 - Headaches: I have moderate headaches that come infrequently.  Section 6 - Concentration: I can concentrate fully with slight difficulty.  Section 7 - Sleeping: My sleep is moderately disturbed for up to 2-3 hours.  Section 8 - Driving: I can drive as long as I want with moderate neck pain.  Section 9 - Reading: I can read as much as I want with moderate neck pain.  Section 10 - Recreation: I have some neck pain with a few recreational activities.  Neck Disability Index Score: 21  Neck Disability Index Comments: 42% disability      Time Calculation:   Start Time: 1620  Stop Time: 1715  Time Calculation (min): 55 min  Therapy Charges for Today     Code Description Service Date Service Provider Modifiers Qty    66623928699 HC PT THER PROC EA 15 MIN 10/1/2019 Azra Crawford, PT GP 1    02716004626 HC PT MANUAL THERAPY EA 15 MIN 10/1/2019 Azra Crawford, PT GP 2    53149675453 HC PT HOT OR COLD PACK TREAT MCARE 10/1/2019 Azra Crawford, PT GP 1          PT G-Codes  Outcome Measure Options: Neck Disability  Index (NDI)  Neck Disability Index Score: 21         Azra Crawford, PT  10/1/2019

## 2019-10-03 ENCOUNTER — APPOINTMENT (OUTPATIENT)
Dept: PHYSICAL THERAPY | Facility: HOSPITAL | Age: 69
End: 2019-10-03

## 2019-10-08 ENCOUNTER — HOSPITAL ENCOUNTER (OUTPATIENT)
Dept: PHYSICAL THERAPY | Facility: HOSPITAL | Age: 69
Setting detail: THERAPIES SERIES
Discharge: HOME | End: 2019-10-08

## 2019-10-08 DIAGNOSIS — M54.2 NECK PAIN: Primary | ICD-10-CM

## 2019-10-08 PROCEDURE — 97140 MANUAL THERAPY 1/> REGIONS: CPT

## 2019-10-08 PROCEDURE — 97110 THERAPEUTIC EXERCISES: CPT

## 2019-10-08 NOTE — THERAPY TREATMENT NOTE
Outpatient Physical Therapy Ortho Treatment Note  [facility]     Patient Name: Quyen Jalloh  : 1950  MRN: [id number]  Today's Date: 10/8/2019      Visit Date: 10/08/2019    Visit Dx:    ICD-10-CM ICD-9-CM   1. Neck pain M54.2 723.1       Patient Active Problem List   Diagnosis   • Adrenal cortical adenoma   • Decreased hearing   • Fatigue   • Hyperlipidemia   • Hypothyroidism   • Insomnia   • Seasonal allergic rhinitis   • Clicking shoulder   • Shoulder pain   • Vitamin D deficiency   • Osteopenia   • Fibromyalgia   • Neck pain   • Microscopic colitis   • Adenomatous polyp of colon        Past Medical History:   Diagnosis Date   • Bloating    • Depression    • Diverticulitis of colon 2016    Impression: 2015 - She had diverticulitis last year.  I think this is a mild flare compared to where she was last year.  Will go ahead and put her on Augmentin for 10 days.  Her WBC is normal now.  Impression: 04/10/2014 - Because of her blood in lucy stool, I am ordering the stat CT.  Will treat as outpt as long as there is not perf seen on CT.  I have put in Augmentin for TX so she doesn't have to use Flagyl.;    • Fibromyalgia     She had been folllowed by Dr. Heineke but he has retired.  She has been unable to find another FM specialist in the area.  She tried seeing Dr. Cordova, but this was a pain clinic and he pushed meds on her that she didn't want to take. She is not interested in narcotics for pain control.  I have agreed to continue her on her current meds and to fill out her disability forms when they come due.  H   • Gallstones    • Hyperlipidemia    • Leukocytosis    • Malignant melanoma of skin (CMS/HCC)    • Non-specific colitis 2016    Impression: 2015 - CT from [facility] reviewed.  There is some generalized edematous wall thickening in lucy entire rectosigmoid colon and some in descending colon.  She is tolerating the Augmentin at this point.  I would like for her to see a  GI doctor to try to determine what kind of colitis we are dealing with.  She is free to advance her diet as tolerated and I think this will actually help bulk up her stools.  Her WBC has been normal through all of this.;    • Osteopenia 04/13/2015    She will due for dexa next year.[id number] Appointment   • Polyp of sigmoid colon     Due for colonoscopy.  She will contact Dr. Harry Atwood's office to set this up.   • Tubular adenoma of colon 07/31/2014    c-scope Dr. Ornelas - repeat scope 5  years        Past Surgical History:   Procedure Laterality Date   • CHOLECYSTECTOMY     • COLONOSCOPY  01/12/2016    Erythematous mucosa in the entire examined colon, diverticulosis in the sigmoid colon, non-bleeding internal hemorrhoids   • MALIGNANT SKIN LESION EXCISION  2006    legs; melanoma   • TUBAL ABDOMINAL LIGATION                         PT Assessment/Plan     Row Name 10/08/19 1201          PT Assessment    Assessment Comments  .Pt w/ report of cont exacerbation of pain up 3 to days after last session. Pt is educated on effect of fibro diagnosis on increased soreness and need for very slow gradual progression of activity to improve symptoms. Also educated on sleeping postures to better support the neck and shoulders. Exercises progressed to include supine shoulder shrug, and modified openbook to improve postural effects of symptoms.  -        PT Plan    PT Plan Comments  cont to progress as tolerated, cont manual treatment, try to progress off table if tolerated  -       User Key  (r) = Recorded By, (t) = Taken By, (c) = Cosigned By    Initials Name Provider Type     Latonia Bella, PT Physical Therapist            OP Exercises     Row Name 10/08/19 1100             Subjective Pain    Able to rate subjective pain?  yes  -      Pre-Treatment Pain Level  6  -         Total Minutes    72177 - PT Therapeutic Exercise Minutes  15  -      24948 - PT Manual Therapy Minutes  25  -         Exercise 1     "Exercise Name 1  supine chin tucks  -      Reps 1  10  -JH      Time 1  5s  -JH         Exercise 2    Exercise Name 2  supine cervical rotation   -      Reps 2  10  -JH      Time 2  5s  -JH         Exercise 3    Exercise Name 3  supine scap ret 50% intensity (\"light shoulder blade imprint\")  -JH      Reps 3  10  -JH      Time 3  5s  -JH         Exercise 4    Exercise Name 4  UBE L1  -      Time 4  3 min  -JH         Exercise 5    Exercise Name 5  sustained shrug in supine for autogenic inhibition of UT  -      Cueing 5  Verbal;Tactile  -      Sets 5  1  -JH      Reps 5  5  -JH      Time 5  10  -JH      Additional Comments  --  -         Exercise 6    Exercise Name 6  modifed openbooks  -      Cueing 6  Verbal;Demo  -      Sets 6  1  -JH      Reps 6  5 each side  -         Exercise 7    Exercise Name 7  --  -      Cueing 7  --  -JH      Sets 7  --  -      Reps 7  --  -JH      Time 7  --  -        User Key  (r) = Recorded By, (t) = Taken By, (c) = Cosigned By    Initials Name Provider Type     Latonia Bella, PT Physical Therapist                      Manual Rx (last 36 hours)      Manual Treatments     Row Name 10/08/19 1100             Total Minutes    83423 - PT Manual Therapy Minutes  25  -         Manual Rx 1    Manual Rx 1 Location  Cervical region   -      Manual Rx 1 Type  Gentle STM/MFR to suboccipitals, cervical paraspinals, UT, levator tissues, decompressive stroking, gentle cervical distraction; gentle passive UT/levator stretching  -        User Key  (r) = Recorded By, (t) = Taken By, (c) = Cosigned By    Initials Name Provider Type     Latonia Bella, PT Physical Therapist          PT OP Goals     Row Name 10/08/19 1100          PT Short Term Goals    STG 1  Patient will be independent with initial HEP for posture and ROM/flexibility without increased symptoms  -     STG 1 Progress  Partially Met  -     STG 1 Progress Comments  Pt reports at least 1x per day  - "     STG 2  Patient will be educated on and demonstrate knowledge of optimal posture, decompression techniques, stress management/relaxation strategies, ergonomics, good body mechanics, and proper lifting techniques to minimize stress to spine and soft tissues with daily function  -     STG 3  Patient will report >/= 25% increased ease/decreased pain with daily functional activities  -        Long Term Goals    LTG 1  Patient will be independent with established HEP for strength/stabilization to aid in self management of symptoms/condition  -     LTG 2  Patient will have >/= 50% cervical AROM with minimal pain for safety and increased ease with driving.  -     LTG 3  Patient will have postural/shoulder girdle strength at least 4/5 for increased stability/tolerance with home/yard management   -     LTG 4  Patient will have pain </= 5/10 with performance of household/community/leisure/recreational activities.  -     LTG 5  Patient will report >/= 50% improvement in quality/quantity of sleep   -       User Key  (r) = Recorded By, (t) = Taken By, (c) = Cosigned By    Initials Name Provider Type     Latonia Bella, PT Physical Therapist          Therapy Education  Education Details: need for slow gradual increase in activity as tolerated to improved symptoms  Given: HEP, Posture/body mechanics, Symptoms/condition management  Program: Reinforced  How Provided: Verbal, Demonstration  Provided to: Patient  Level of Understanding: Teach back education performed, Verbalized              Time Calculation:   Start Time: 1130  Stop Time: 1215  Time Calculation (min): 45 min  Therapy Charges for Today     Code Description Service Date Service Provider Modifiers Qty    55241180843  PT MANUAL THERAPY EA 15 MIN 10/8/2019 Latonia Bella, PT GP 2    [id number]  PT THER PROC EA 15 MIN 10/8/2019 Latonia Bella, PT GP 1                    Latonia Bella PT  10/8/2019

## 2019-10-10 ENCOUNTER — HOSPITAL ENCOUNTER (OUTPATIENT)
Dept: PHYSICAL THERAPY | Facility: HOSPITAL | Age: 69
Setting detail: THERAPIES SERIES
Discharge: HOME | End: 2019-10-10

## 2019-10-10 DIAGNOSIS — M54.2 NECK PAIN: Primary | ICD-10-CM

## 2019-10-10 PROCEDURE — 97110 THERAPEUTIC EXERCISES: CPT

## 2019-10-10 PROCEDURE — 97140 MANUAL THERAPY 1/> REGIONS: CPT

## 2019-10-10 NOTE — THERAPY TREATMENT NOTE
Outpatient Physical Therapy Ortho Treatment Note  [facility]     Patient Name: Quyen Jalloh  : 1950  MRN: [id number]  Today's Date: 10/10/2019      Visit Date: 10/10/2019    Visit Dx:    ICD-10-CM ICD-9-CM   1. Neck pain M54.2 723.1       Patient Active Problem List   Diagnosis   • Adrenal cortical adenoma   • Decreased hearing   • Fatigue   • Hyperlipidemia   • Hypothyroidism   • Insomnia   • Seasonal allergic rhinitis   • Clicking shoulder   • Shoulder pain   • Vitamin D deficiency   • Osteopenia   • Fibromyalgia   • Neck pain   • Microscopic colitis   • Adenomatous polyp of colon        Past Medical History:   Diagnosis Date   • Bloating    • Depression    • Diverticulitis of colon 2016    Impression: 2015 - She had diverticulitis last year.  I think this is a mild flare compared to where she was last year.  Will go ahead and put her on Augmentin for 10 days.  Her WBC is normal now.  Impression: 04/10/2014 - Because of her blood in lucy stool, I am ordering the stat CT.  Will treat as outpt as long as there is not perf seen on CT.  I have put in Augmentin for TX so she doesn't have to use Flagyl.;    • Fibromyalgia     She had been folllowed by Dr. Heineke but he has retired.  She has been unable to find another FM specialist in the area.  She tried seeing Dr. Cordova, but this was a pain clinic and he pushed meds on her that she didn't want to take. She is not interested in narcotics for pain control.  I have agreed to continue her on her current meds and to fill out her disability forms when they come due.  H   • Gallstones    • Hyperlipidemia    • Leukocytosis    • Malignant melanoma of skin (CMS/HCC)    • Non-specific colitis 2016    Impression: 2015 - CT from [facility] reviewed.  There is some generalized edematous wall thickening in lucy entire rectosigmoid colon and some in descending colon.  She is tolerating the Augmentin at this point.  I would like for her to see  a GI doctor to try to determine what kind of colitis we are dealing with.  She is free to advance her diet as tolerated and I think this will actually help bulk up her stools.  Her WBC has been normal through all of this.;    • Osteopenia 04/13/2015    She will due for dexa next year.[id number] Appointment   • Polyp of sigmoid colon     Due for colonoscopy.  She will contact Dr. Harry Atwood's office to set this up.   • Tubular adenoma of colon 07/31/2014    c-scope Dr. Ornelas - repeat scope 5  years        Past Surgical History:   Procedure Laterality Date   • CHOLECYSTECTOMY     • COLONOSCOPY  01/12/2016    Erythematous mucosa in the entire examined colon, diverticulosis in the sigmoid colon, non-bleeding internal hemorrhoids   • MALIGNANT SKIN LESION EXCISION  2006    legs; melanoma   • TUBAL ABDOMINAL LIGATION                         PT Assessment/Plan     Row Name 10/10/19 1241          PT Assessment    Assessment Comments  Pt reports continued intermittent exacerbation of symptoms as well as guarding in shoulder/cervical tissues. Pt tolerated manual techniques with minimal increase in symptoms and discussed relaxation techniques in order to reduce guarding through affected tissues.  -CN        PT Plan    PT Plan Comments  Continue with gentle manual techniques and postural strengthening as tolerated.   -CN       User Key  (r) = Recorded By, (t) = Taken By, (c) = Cosigned By    Initials Name Provider Type    Denisha Hernandez, PT Physical Therapist          Modalities     Row Name 10/10/19 1200             Moist Heat    MH Applied  Yes  -CN      Location  cervical/thoracic spine in supine decompression (legs over over bolsterl).  -CN      Rx Minutes  15 mins  -CN      MH S/P Rx  Yes  -CN        User Key  (r) = Recorded By, (t) = Taken By, (c) = Cosigned By    Initials Name Provider Type    Denisha Hernandez, PT Physical Therapist        OP Exercises     Row Name 10/10/19 1200 10/10/19 1100     "      Subjective Comments    Subjective Comments  I started a medication from the doctor and it gave me hot flashes and nausea so I can't take it anymore.   -CN  --        Subjective Pain    Able to rate subjective pain?  yes  -CN  --     Pre-Treatment Pain Level  6  -CN  --        Total Minutes    58453 - PT Therapeutic Exercise Minutes  15  -CN  --     33114 - PT Manual Therapy Minutes  --  25  -CN        Exercise 1    Exercise Name 1  supine chin tucks  -CN  --     Reps 1  10  -CN  --     Time 1  5s  -CN  --        Exercise 2    Exercise Name 2  supine cervical rotation   -CN  --     Reps 2  10  -CN  --     Time 2  5s  -CN  --        Exercise 3    Exercise Name 3  supine scap ret 50% intensity (\"light shoulder blade imprint\")  -CN  --     Reps 3  10  -CN  --     Time 3  5s  -CN  --        Exercise 4    Exercise Name 4  UBE L1  -CN  --     Time 4  4 min  -CN  --        Exercise 5    Exercise Name 5  sustained shrug in supine for autogenic inhibition of UT  -CN  --     Cueing 5  Verbal;Tactile  -CN  --     Sets 5  1  -CN  --     Reps 5  5  -CN  --     Time 5  10  -CN  --        Exercise 6    Exercise Name 6  modifed openbooks  -CN  --     Cueing 6  Verbal;Demo  -CN  --     Sets 6  1  -CN  --     Reps 6  5 each side  -CN  --       User Key  (r) = Recorded By, (t) = Taken By, (c) = Cosigned By    Initials Name Provider Type    Denisha Hernandez, PT Physical Therapist                      Manual Rx (last 36 hours)      Manual Treatments     Row Name 10/10/19 1100             Total Minutes    83072 - PT Manual Therapy Minutes  25  -CN         Manual Rx 1    Manual Rx 1 Location  Cervical region   -CN      Manual Rx 1 Type  Gentle STM/MFR to suboccipitals, cervical paraspinals, UT, levator tissues, decompressive stroking, gentle cervical distraction; gentle passive UT/levator stretching  -CN        User Key  (r) = Recorded By, (t) = Taken By, (c) = Cosigned By    Initials Name Provider Type    MAME Worrell" Denisha Martinez, PT Physical Therapist              Therapy Education  Given: HEP, Posture/body mechanics, Symptoms/condition management  Program: Reinforced  How Provided: Verbal, Demonstration  Provided to: Patient  Level of Understanding: Teach back education performed, Verbalized              Time Calculation:   Start Time: 1202  Stop Time: 1252  Time Calculation (min): 50 min  Therapy Charges for Today     Code Description Service Date Service Provider Modifiers Qty    17987021549 HC PT THER PROC EA 15 MIN 10/10/2019 Denisha Worrell, PT GP 1    90614081898 HC PT MANUAL THERAPY EA 15 MIN 10/10/2019 Denisha Worrell, PT GP 2    75805167043 HC PT HOT OR COLD PACK TREAT MCARE 10/10/2019 Denisha Worrell, PT GP 1                    Denisha Worrell, PT  10/10/2019

## 2019-10-15 ENCOUNTER — APPOINTMENT (OUTPATIENT)
Dept: PHYSICAL THERAPY | Facility: HOSPITAL | Age: 69
End: 2019-10-15

## 2019-10-17 ENCOUNTER — APPOINTMENT (OUTPATIENT)
Dept: PHYSICAL THERAPY | Facility: HOSPITAL | Age: 69
End: 2019-10-17

## 2019-10-22 ENCOUNTER — HOSPITAL ENCOUNTER (OUTPATIENT)
Dept: PHYSICAL THERAPY | Facility: HOSPITAL | Age: 69
Setting detail: THERAPIES SERIES
Discharge: HOME | End: 2019-10-22

## 2019-10-22 DIAGNOSIS — M54.2 NECK PAIN: Primary | ICD-10-CM

## 2019-10-22 PROCEDURE — 97110 THERAPEUTIC EXERCISES: CPT

## 2019-10-22 PROCEDURE — 97140 MANUAL THERAPY 1/> REGIONS: CPT

## 2019-10-22 NOTE — THERAPY PROGRESS REPORT/RE-CERT
Outpatient Physical Therapy Ortho Re-Assessment  [facility]     Patient Name: Quyen Jalloh  : 1950  MRN: [id number]  Today's Date: 10/22/2019      Visit Date: 10/22/2019    Patient Active Problem List   Diagnosis   • Adrenal cortical adenoma   • Decreased hearing   • Fatigue   • Hyperlipidemia   • Hypothyroidism   • Insomnia   • Seasonal allergic rhinitis   • Clicking shoulder   • Shoulder pain   • Vitamin D deficiency   • Osteopenia   • Fibromyalgia   • Neck pain   • Microscopic colitis   • Adenomatous polyp of colon        Past Medical History:   Diagnosis Date   • Bloating    • Depression    • Diverticulitis of colon 2016    Impression: 2015 - She had diverticulitis last year.  I think this is a mild flare compared to where she was last year.  Will go ahead and put her on Augmentin for 10 days.  Her WBC is normal now.  Impression: 04/10/2014 - Because of her blood in lucy stool, I am ordering the stat CT.  Will treat as outpt as long as there is not perf seen on CT.  I have put in Augmentin for TX so she doesn't have to use Flagyl.;    • Fibromyalgia     She had been folllowed by Dr. Heineke but he has retired.  She has been unable to find another FM specialist in the area.  She tried seeing Dr. Cordova, but this was a pain clinic and he pushed meds on her that she didn't want to take. She is not interested in narcotics for pain control.  I have agreed to continue her on her current meds and to fill out her disability forms when they come due.  H   • Gallstones    • Hyperlipidemia    • Leukocytosis    • Malignant melanoma of skin (CMS/HCC)    • Non-specific colitis 2016    Impression: 2015 - CT from [facility] reviewed.  There is some generalized edematous wall thickening in lucy entire rectosigmoid colon and some in descending colon.  She is tolerating the Augmentin at this point.  I would like for her to see a GI doctor to try to determine what kind of colitis we are  dealing with.  She is free to advance her diet as tolerated and I think this will actually help bulk up her stools.  Her WBC has been normal through all of this.;    • Osteopenia 04/13/2015    She will due for dexa next year.[id number] Appointment   • Polyp of sigmoid colon     Due for colonoscopy.  She will contact Dr. Harry Atwood's office to set this up.   • Tubular adenoma of colon 07/31/2014    c-scope Dr. Ornelas - repeat scope 5  years        Past Surgical History:   Procedure Laterality Date   • CHOLECYSTECTOMY     • COLONOSCOPY  01/12/2016    Erythematous mucosa in the entire examined colon, diverticulosis in the sigmoid colon, non-bleeding internal hemorrhoids   • MALIGNANT SKIN LESION EXCISION  2006    legs; melanoma   • TUBAL ABDOMINAL LIGATION         Visit Dx:     ICD-10-CM ICD-9-CM   1. Neck pain M54.2 723.1                             Therapy Education  Given: HEP, Posture/body mechanics, Symptoms/condition management  Program: Reinforced  How Provided: Verbal, Demonstration  Provided to: Patient  Level of Understanding: Teach back education performed, Verbalized     PT OP Goals     Row Name 10/22/19 1200          PT Short Term Goals    STG 1  Patient will be independent with initial HEP for posture and ROM/flexibility without increased symptoms  -CN     STG 1 Progress  Partially Met  -CN     STG 2  Patient will be educated on and demonstrate knowledge of optimal posture, decompression techniques, stress management/relaxation strategies, ergonomics, good body mechanics, and proper lifting techniques to minimize stress to spine and soft tissues with daily function  -CN     STG 2 Progress  Progressing  -CN     STG 2 Progress Comments  Continuing to work on relaxation strategies to decrease guarding in affected tissues.   -CN     STG 3  Patient will report >/= 25% increased ease/decreased pain with daily functional activities  -CN     STG 3 Progress  Ongoing  -CN     STG 3 Progress Comments  No change in  ability to perform functional activities to date.   -CN        Long Term Goals    LTG 1  Patient will be independent with established HEP for strength/stabilization to aid in self management of symptoms/condition  -CN     LTG 1 Progress  Ongoing  -CN     LTG 1 Progress Comments  Progressing HEP per pt tolerance.  -CN     LTG 2  Patient will have >/= 50% cervical AROM with minimal pain for safety and increased ease with driving.  -CN     LTG 2 Progress  Partially Met  -CN     LTG 2 Progress Comments  Pt with improved cervical ROM noted today.   -CN     LTG 3  Patient will have postural/shoulder girdle strength at least 4/5 for increased stability/tolerance with home/yard management   -CN     LTG 3 Progress  Ongoing  -CN     LTG 3 Progress Comments  No change in shoulder MMT to date.   -CN     LTG 4  Patient will have pain </= 5/10 with performance of household/community/leisure/recreational activities.  -CN     LTG 4 Progress  Ongoing  -CN     LTG 5  Patient will report >/= 50% improvement in quality/quantity of sleep   -CN     LTG 5 Progress  Ongoing  -CN     LTG 5 Progress Comments  No change in sleep to date.   -CN       User Key  (r) = Recorded By, (t) = Taken By, (c) = Cosigned By    Initials Name Provider Type    Denisha Hernandez, PT Physical Therapist          PT Assessment/Plan     Row Name 10/22/19 1212          PT Assessment    Functional Limitations  Limitations in community activities;Performance in leisure activities;Limitation in home management  -CN     Impairments  Impaired flexibility;Muscle strength;Pain;Posture;Range of motion  -CN     Assessment Comments  Pt has attended 5 skilled therapy sessions for treatment of cervical and shoulder pain, chronic in nature with recent exacerbation. Pt with limited progress with PT at this time due to vacation last week and poor tolerance to exercise progression. Pt flares up easily with manual treatment and strengthening exercise and denies improved  symptoms to date. Pt with tenderness and guarding through UT, suboccipital and pec tissues which exacerbate symptoms and would benefit from continued skilled PT to address the deficits and return to PLOF.   -CN     Please refer to paper survey for additional self-reported information  Yes  -CN     Rehab Potential  Good  -CN     Patient/caregiver participated in establishment of treatment plan and goals  Yes  -CN     Patient would benefit from skilled therapy intervention  Yes  -CN        PT Plan    PT Frequency  2x/week  -CN     Predicted Duration of Therapy Intervention (Therapy Eval)  4 weeks  -CN     PT Plan Comments  PT to continue with current treatment plan of manual and progressive postural strengthening as tolerated.   -CN       User Key  (r) = Recorded By, (t) = Taken By, (c) = Cosigned By    Initials Name Provider Type    Denisha Hernandez, KAY Physical Therapist          Modalities     Row Name 10/22/19 1100             Moist Heat    MH Applied  Yes  -CN      Location  cervical/thoracic spine in supine decompression (legs over over bolsterl).  -CN      Rx Minutes  15 mins  -CN      MH S/P Rx  Yes  -CN        User Key  (r) = Recorded By, (t) = Taken By, (c) = Cosigned By    Initials Name Provider Type    Denisha Hernandez, PT Physical Therapist        OP Exercises     Row Name 10/22/19 1200 10/22/19 1100          Subjective Comments    Subjective Comments  The right shoulder has been giving me fits. I was in the car and had trouble with the positioning of the head rest.   -CN  --        Subjective Pain    Able to rate subjective pain?  yes  -CN  --     Pre-Treatment Pain Level  6  -CN  --        Total Minutes    14531 - PT Therapeutic Exercise Minutes  15  -CN  --     17649 - PT Manual Therapy Minutes  --  25  -CN        Exercise 1    Exercise Name 1  supine chin tucks  -CN  --     Reps 1  10  -CN  --     Time 1  5s  -CN  --        Exercise 2    Exercise Name 2  supine cervical rotation    "-CN  --     Reps 2  10  -CN  --     Time 2  5s  -CN  --        Exercise 3    Exercise Name 3  supine scap ret 50% intensity (\"light shoulder blade imprint\")  -CN  --     Reps 3  10  -CN  --     Time 3  5s  -CN  --        Exercise 4    Exercise Name 4  UBE L1  -CN  --     Time 4  3 min  -CN  --     Additional Comments  stopped after 3 min due to inc neck symptoms  -CN  --        Exercise 7    Exercise Name 7  Small range doorway pec stretch, R only  -CN  --     Cueing 7  Demo  -CN  --     Reps 7  3  -CN  --     Time 7  20 sec  -CN  --       User Key  (r) = Recorded By, (t) = Taken By, (c) = Cosigned By    Initials Name Provider Type    Denisha Hernandez PT Physical Therapist           Manual Rx (last 36 hours)      Manual Treatments     Row Name 10/22/19 1100             Total Minutes    07173 - PT Manual Therapy Minutes  25  -CN         Manual Rx 1    Manual Rx 1 Location  Cervical region   -CN      Manual Rx 1 Type  Gentle STM/MFR to suboccipitals, cervical paraspinals, UT, levator tissues, decompressive stroking, gentle cervical distraction; gentle passive UT/levator stretching  -CN        User Key  (r) = Recorded By, (t) = Taken By, (c) = Cosigned By    Initials Name Provider Type    Denisha Hernandez, KAY Physical Therapist                      Outcome Measure Options: Neck Disability Index (NDI)(56% where 0% is no disability)         Time Calculation:     Start Time: 1200  Stop Time: 1250  Time Calculation (min): 50 min     Therapy Charges for Today     Code Description Service Date Service Provider Modifiers Qty    02625485214 HC PT THER PROC EA 15 MIN 10/22/2019 Denisha Worrell, PT GP 1    19088204770 HC PT MANUAL THERAPY EA 15 MIN 10/22/2019 Denisha Worrell, PT GP 2    [id number]  PT HOT OR COLD PACK TREAT MCARE 10/22/2019 Denisha Worrell, PT GP 1          PT G-Codes  Outcome Measure Options: Neck Disability Index (NDI)(56% where 0% is no disability) "         Denisha Worrell, PT  10/22/2019

## 2019-10-24 ENCOUNTER — HOSPITAL ENCOUNTER (OUTPATIENT)
Dept: PHYSICAL THERAPY | Facility: HOSPITAL | Age: 69
Setting detail: THERAPIES SERIES
Discharge: HOME | End: 2019-10-24

## 2019-10-24 DIAGNOSIS — M54.2 NECK PAIN: Primary | ICD-10-CM

## 2019-10-24 PROCEDURE — 97140 MANUAL THERAPY 1/> REGIONS: CPT

## 2019-10-24 PROCEDURE — 97110 THERAPEUTIC EXERCISES: CPT

## 2019-10-24 NOTE — THERAPY TREATMENT NOTE
Outpatient Physical Therapy Ortho Treatment Note  [facility]     Patient Name: Quyen Jalloh  : 1950  MRN: [id number]  Today's Date: 10/24/2019      Visit Date: 10/24/2019    Visit Dx:    ICD-10-CM ICD-9-CM   1. Neck pain M54.2 723.1       Patient Active Problem List   Diagnosis   • Adrenal cortical adenoma   • Decreased hearing   • Fatigue   • Hyperlipidemia   • Hypothyroidism   • Insomnia   • Seasonal allergic rhinitis   • Clicking shoulder   • Shoulder pain   • Vitamin D deficiency   • Osteopenia   • Fibromyalgia   • Neck pain   • Microscopic colitis   • Adenomatous polyp of colon        Past Medical History:   Diagnosis Date   • Bloating    • Depression    • Diverticulitis of colon 2016    Impression: 2015 - She had diverticulitis last year.  I think this is a mild flare compared to where she was last year.  Will go ahead and put her on Augmentin for 10 days.  Her WBC is normal now.  Impression: 04/10/2014 - Because of her blood in lucy stool, I am ordering the stat CT.  Will treat as outpt as long as there is not perf seen on CT.  I have put in Augmentin for TX so she doesn't have to use Flagyl.;    • Fibromyalgia     She had been folllowed by Dr. Heineke but he has retired.  She has been unable to find another FM specialist in the area.  She tried seeing Dr. Cordova, but this was a pain clinic and he pushed meds on her that she didn't want to take. She is not interested in narcotics for pain control.  I have agreed to continue her on her current meds and to fill out her disability forms when they come due.  H   • Gallstones    • Hyperlipidemia    • Leukocytosis    • Malignant melanoma of skin (CMS/HCC)    • Non-specific colitis 2016    Impression: 2015 - CT from [facility] reviewed.  There is some generalized edematous wall thickening in lucy entire rectosigmoid colon and some in descending colon.  She is tolerating the Augmentin at this point.  I would like for her to see  a GI doctor to try to determine what kind of colitis we are dealing with.  She is free to advance her diet as tolerated and I think this will actually help bulk up her stools.  Her WBC has been normal through all of this.;    • Osteopenia 04/13/2015    She will due for dexa next year.[id number] Appointment   • Polyp of sigmoid colon     Due for colonoscopy.  She will contact Dr. Harry Atwood's office to set this up.   • Tubular adenoma of colon 07/31/2014    c-scope Dr. Ornelas - repeat scope 5  years        Past Surgical History:   Procedure Laterality Date   • CHOLECYSTECTOMY     • COLONOSCOPY  01/12/2016    Erythematous mucosa in the entire examined colon, diverticulosis in the sigmoid colon, non-bleeding internal hemorrhoids   • MALIGNANT SKIN LESION EXCISION  2006    legs; melanoma   • TUBAL ABDOMINAL LIGATION                         PT Assessment/Plan     Row Name 10/24/19 1432          PT Assessment    Assessment Comments  Pt reports decreased pain overall today which she is unsure is attributed to sleeping well for past 3 nights vs yoga this morning involving gentle cervical/shoulder stretching. Pt with decreased tautness noted with manual therapy today and able to gently progress exercises with only mild increase in symptoms.   -CN        PT Plan    PT Plan Comments  Assess response to added exercises and continue to progress as able.   -CN       User Key  (r) = Recorded By, (t) = Taken By, (c) = Cosigned By    Initials Name Provider Type    Denisha Hernandez, PT Physical Therapist          Modalities     Row Name 10/24/19 1300             Moist Heat    MH Applied  Yes  -CN      Location  cervical/thoracic spine in supine decompression (legs over over bolsterl).  -CN      Rx Minutes  15 mins  -CN      MH S/P Rx  Yes  -CN        User Key  (r) = Recorded By, (t) = Taken By, (c) = Cosigned By    Initials Name Provider Type    Denisha Hernandez, PT Physical Therapist        OP Exercises     Row  "Name 10/24/19 1300             Subjective Comments    Subjective Comments  It actually feels ok today I think. I went to yoga this morning and we did some of the same exercises.   -CN         Subjective Pain    Able to rate subjective pain?  yes  -CN      Pre-Treatment Pain Level  5  -CN         Total Minutes    84765 - PT Therapeutic Exercise Minutes  15  -CN      02238 - PT Manual Therapy Minutes  25  -CN         Exercise 1    Exercise Name 1  supine chin tucks  -CN      Reps 1  10  -CN      Time 1  5s  -CN         Exercise 2    Exercise Name 2  supine cervical rotation   -CN      Reps 2  10  -CN      Time 2  5s  -CN         Exercise 3    Exercise Name 3  supine scap ret 50% intensity (\"light shoulder blade imprint\")  -CN      Reps 3  15  -CN      Time 3  5s  -CN         Exercise 4    Exercise Name 4  UBE L1  -CN      Time 4  4 min  -CN         Exercise 8    Exercise Name 8  Supine OH flexion, L only  -CN      Cueing 8  Demo  -CN      Reps 8  10  -CN         Exercise 9    Exercise Name 9  Seated small range UT stretch  -CN      Cueing 9  Demo  -CN      Reps 9  3  -CN      Time 9  20 sec  -CN        User Key  (r) = Recorded By, (t) = Taken By, (c) = Cosigned By    Initials Name Provider Type    CN Denisha Worrell, PT Physical Therapist                      Manual Rx (last 36 hours)      Manual Treatments     Row Name 10/24/19 1300             Total Minutes    82393 - PT Manual Therapy Minutes  25  -CN         Manual Rx 1    Manual Rx 1 Location  Cervical region   -CN      Manual Rx 1 Type  Gentle STM/MFR to suboccipitals, cervical paraspinals, UT, levator tissues, decompressive stroking, gentle cervical distraction; gentle passive UT/levator stretching  -CN        User Key  (r) = Recorded By, (t) = Taken By, (c) = Cosigned By    Initials Name Provider Type    CN Denisha Worrell, PT Physical Therapist              Therapy Education  Given: HEP, Posture/body mechanics, Symptoms/condition " management  Program: Reinforced  How Provided: Verbal, Demonstration  Provided to: Patient  Level of Understanding: Teach back education performed, Verbalized              Time Calculation:   Start Time: 1345  Stop Time: 1435  Time Calculation (min): 50 min  Therapy Charges for Today     Code Description Service Date Service Provider Modifiers Qty    11093277250  PT THER PROC EA 15 MIN 10/24/2019 Denisha Worrell, PT GP 1    [id number]  PT MANUAL THERAPY EA 15 MIN 10/24/2019 Denisha Worrell, PT GP 2    [id number]  PT HOT OR COLD PACK TREAT MCARE 10/24/2019 Denisha Worrell, PT GP 1                    Denisha Worrell, PT  10/24/2019

## 2019-10-29 ENCOUNTER — HOSPITAL ENCOUNTER (OUTPATIENT)
Dept: PHYSICAL THERAPY | Facility: HOSPITAL | Age: 69
Setting detail: THERAPIES SERIES
Discharge: HOME | End: 2019-10-29

## 2019-10-29 DIAGNOSIS — M54.2 NECK PAIN: Primary | ICD-10-CM

## 2019-10-29 PROCEDURE — 97140 MANUAL THERAPY 1/> REGIONS: CPT | Performed by: PHYSICAL THERAPIST

## 2019-10-29 PROCEDURE — 97110 THERAPEUTIC EXERCISES: CPT | Performed by: PHYSICAL THERAPIST

## 2019-10-30 NOTE — THERAPY TREATMENT NOTE
Outpatient Physical Therapy Ortho Treatment Note  [facility]     Patient Name: Quyen Jalloh  : 1950  MRN: [id number]  Today's Date: 10/29/2019      Visit Date: 10/29/2019    Visit Dx:    ICD-10-CM ICD-9-CM   1. Neck pain M54.2 723.1       Patient Active Problem List   Diagnosis   • Adrenal cortical adenoma   • Decreased hearing   • Fatigue   • Hyperlipidemia   • Hypothyroidism   • Insomnia   • Seasonal allergic rhinitis   • Clicking shoulder   • Shoulder pain   • Vitamin D deficiency   • Osteopenia   • Fibromyalgia   • Neck pain   • Microscopic colitis   • Adenomatous polyp of colon        Past Medical History:   Diagnosis Date   • Bloating    • Depression    • Diverticulitis of colon 2016    Impression: 2015 - She had diverticulitis last year.  I think this is a mild flare compared to where she was last year.  Will go ahead and put her on Augmentin for 10 days.  Her WBC is normal now.  Impression: 04/10/2014 - Because of her blood in lucy stool, I am ordering the stat CT.  Will treat as outpt as long as there is not perf seen on CT.  I have put in Augmentin for TX so she doesn't have to use Flagyl.;    • Fibromyalgia     She had been folllowed by Dr. Heineke but he has retired.  She has been unable to find another FM specialist in the area.  She tried seeing Dr. Cordova, but this was a pain clinic and he pushed meds on her that she didn't want to take. She is not interested in narcotics for pain control.  I have agreed to continue her on her current meds and to fill out her disability forms when they come due.  H   • Gallstones    • Hyperlipidemia    • Leukocytosis    • Malignant melanoma of skin (CMS/HCC)    • Non-specific colitis 2016    Impression: 2015 - CT from [facility] reviewed.  There is some generalized edematous wall thickening in lucy entire rectosigmoid colon and some in descending colon.  She is tolerating the Augmentin at this point.  I would like for her to see  a GI doctor to try to determine what kind of colitis we are dealing with.  She is free to advance her diet as tolerated and I think this will actually help bulk up her stools.  Her WBC has been normal through all of this.;    • Osteopenia 04/13/2015    She will due for dexa next year.[id number] Appointment   • Polyp of sigmoid colon     Due for colonoscopy.  She will contact Dr. Harry Atwood's office to set this up.   • Tubular adenoma of colon 07/31/2014    c-scope Dr. Ornelas - repeat scope 5  years        Past Surgical History:   Procedure Laterality Date   • CHOLECYSTECTOMY     • COLONOSCOPY  01/12/2016    Erythematous mucosa in the entire examined colon, diverticulosis in the sigmoid colon, non-bleeding internal hemorrhoids   • MALIGNANT SKIN LESION EXCISION  2006    legs; melanoma   • TUBAL ABDOMINAL LIGATION                         PT Assessment/Plan     Row Name 10/29/19 1448          PT Assessment    Assessment Comments  Patient  indicates her neck does seem to be getting better but improvement is slower than she would like.    -RA       User Key  (r) = Recorded By, (t) = Taken By, (c) = Cosigned By    Initials Name Provider Type    Azra Cross, PT Physical Therapist          Modalities     Row Name 10/29/19 1400             Moist Heat    MH Applied  Yes  -RA      Location  cervical/thoracic spine in supine decompression (legs over over bolsterl).  -RA      Rx Minutes  15 mins  -RA      MH S/P Rx  Yes  -RA        User Key  (r) = Recorded By, (t) = Taken By, (c) = Cosigned By    Initials Name Provider Type    Azra Cross, PT Physical Therapist        OP Exercises     Row Name 10/29/19 1400 10/29/19 1300          Subjective Comments    Subjective Comments  Neck seems to be getting better, shoulder about the same  -RA  --        Subjective Pain    Able to rate subjective pain?  yes  -RA  --     Pre-Treatment Pain Level  5  -RA  --     Subjective Pain Comment  4-5/10  -RA  --        Total Minutes  "   81623 - PT Therapeutic Exercise Minutes  15  -RA  --     37422 - PT Manual Therapy Minutes  --  25  -RA        Exercise 1    Exercise Name 1  supine chin tucks  -RA  --     Reps 1  10  -RA  --     Time 1  5s  -RA  --        Exercise 2    Exercise Name 2  supine cervical rotation   -RA  --     Reps 2  10  -RA  --     Time 2  5s  -RA  --        Exercise 3    Exercise Name 3  supine scap ret 50% intensity (\"light shoulder blade imprint\")  -RA  --     Reps 3  15  -RA  --     Time 3  5s  -RA  --        Exercise 4    Exercise Name 4  UBE L1  -RA  --     Time 4  4 min  -RA  --        Exercise 8    Exercise Name 8  Supine OH flexion, L only  -RA  --     Cueing 8  Demo  -RA  --     Reps 8  10  -RA  --        Exercise 9    Exercise Name 9  Seated small range UT stretch  -RA  --     Cueing 9  Demo  -RA  --     Reps 9  3  -RA  --     Time 9  20 sec  -RA  --       User Key  (r) = Recorded By, (t) = Taken By, (c) = Cosigned By    Initials Name Provider Type    Azra Cross, PT Physical Therapist                      Manual Rx (last 36 hours)      Manual Treatments     Row Name 10/29/19 1300             Total Minutes    44123 - PT Manual Therapy Minutes  25  -RA         Manual Rx 1    Manual Rx 1 Location  Cervical region   -RA      Manual Rx 1 Type  Gentle STM/MFR to suboccipitals, cervical paraspinals, UT, levator tissues, decompressive stroking, gentle cervical distraction; gentle passive UT/levator stretching  -RA        User Key  (r) = Recorded By, (t) = Taken By, (c) = Cosigned By    Initials Name Provider Type    Azra Cross, PT Physical Therapist              Therapy Education  Given: HEP, Posture/body mechanics, Symptoms/condition management  Program: Reinforced  How Provided: Verbal, Demonstration  Provided to: Patient  Level of Understanding: Teach back education performed, Verbalized              Time Calculation:   Start Time: 1405  Stop Time: 1500  Time Calculation (min): 55 min  Therapy Charges for " Today     Code Description Service Date Service Provider Modifiers Qty    38575900024 HC PT THER PROC EA 15 MIN 10/29/2019 Azra Crawford, PT GP 1    84407162097 HC PT MANUAL THERAPY EA 15 MIN 10/29/2019 Azra Crawofrd, PT GP 2    [id number]  PT HOT OR COLD PACK TREAT MCARE 10/29/2019 Azra Crawford, PT GP 1                    Azra Crawford, PT  10/29/2019

## 2019-10-31 ENCOUNTER — HOSPITAL ENCOUNTER (OUTPATIENT)
Dept: PHYSICAL THERAPY | Facility: HOSPITAL | Age: 69
Setting detail: THERAPIES SERIES
Discharge: HOME | End: 2019-10-31

## 2019-10-31 DIAGNOSIS — M54.2 NECK PAIN: Primary | ICD-10-CM

## 2019-10-31 PROCEDURE — 97110 THERAPEUTIC EXERCISES: CPT

## 2019-10-31 PROCEDURE — 97010 HOT OR COLD PACKS THERAPY: CPT

## 2019-10-31 PROCEDURE — 97140 MANUAL THERAPY 1/> REGIONS: CPT

## 2019-11-05 ENCOUNTER — HOSPITAL ENCOUNTER (OUTPATIENT)
Dept: PHYSICAL THERAPY | Facility: HOSPITAL | Age: 69
Setting detail: THERAPIES SERIES
Discharge: HOME | End: 2019-11-05

## 2019-11-05 ENCOUNTER — OFFICE VISIT (OUTPATIENT)
Dept: GASTROENTEROLOGY | Facility: CLINIC | Age: 69
End: 2019-11-05

## 2019-11-05 VITALS
DIASTOLIC BLOOD PRESSURE: 80 MMHG | WEIGHT: 136.4 LBS | BODY MASS INDEX: 25.75 KG/M2 | TEMPERATURE: 98.3 F | HEIGHT: 61 IN | SYSTOLIC BLOOD PRESSURE: 132 MMHG

## 2019-11-05 DIAGNOSIS — R15.2 FECAL URGENCY: ICD-10-CM

## 2019-11-05 DIAGNOSIS — R19.4 ALTERED BOWEL HABITS: Primary | ICD-10-CM

## 2019-11-05 DIAGNOSIS — M54.2 NECK PAIN: Primary | ICD-10-CM

## 2019-11-05 PROCEDURE — 99213 OFFICE O/P EST LOW 20 MIN: CPT | Performed by: INTERNAL MEDICINE

## 2019-11-05 PROCEDURE — 97110 THERAPEUTIC EXERCISES: CPT | Performed by: PHYSICAL THERAPIST

## 2019-11-05 PROCEDURE — 97140 MANUAL THERAPY 1/> REGIONS: CPT | Performed by: PHYSICAL THERAPIST

## 2019-11-05 RX ORDER — TRAMADOL HYDROCHLORIDE 50 MG/1
50 TABLET ORAL EVERY 6 HOURS PRN
Refills: 0 | COMMUNITY
Start: 2019-10-09 | End: 2022-07-12

## 2019-11-05 NOTE — PROGRESS NOTES
Subjective   Chief Complaint   Patient presents with   • Diarrhea     improved    • Constipation       Quyen Jalloh is a  69 y.o. female here for a follow up visit for colitis, likely lymphocytic.     She has had improvement in her symptoms with Xifaxan in the past.  She was doing better but now symptoms have recurred.  She is now having pain and this is new.  She has to take imodium but this constipates her.    She has a history of colon polyps and is due for next colonoscopy in 2021.  Last c/s did show diffuse mild inflammatory change most suggestive of micfroscopic colitis.     Since her last visit - She completed 2-3 months of pepto and the diarrhea resolved.  She now is more constipated than anything.  She is taking fiber most days -- if she takes more than a few days in a row she will have multiple BMs and urgency.  She is avoiding nsaids.Urgency limits her activities and travel.      Not taking tramadol.  .   HPI  Past Medical History:   Diagnosis Date   • Bloating    • DDD (degenerative disc disease), cervical    • Depression    • Diverticulitis of colon 1/28/2016    Impression: 11/18/2015 - She had diverticulitis last year.  I think this is a mild flare compared to where she was last year.  Will go ahead and put her on Augmentin for 10 days.  Her WBC is normal now.  Impression: 04/10/2014 - Because of her blood in lucy stool, I am ordering the stat CT.  Will treat as outpt as long as there is not perf seen on CT.  I have put in Augmentin for TX so she doesn't have to use Flagyl.;    • Fibromyalgia     She had been folllowed by Dr. Heineke but he has retired.  She has been unable to find another FM specialist in the area.  She tried seeing Dr. Cordova, but this was a pain clinic and he pushed meds on her that she didn't want to take. She is not interested in narcotics for pain control.  I have agreed to continue her on her current meds and to fill out her disability forms when they come due.  H   •  Gallstones    • Hyperlipidemia    • Leukocytosis    • Malignant melanoma of skin (CMS/HCC)    • Non-specific colitis 1/28/2016    Impression: 12/07/2015 - CT from [facility] reviewed.  There is some generalized edematous wall thickening in lucy entire rectosigmoid colon and some in descending colon.  She is tolerating the Augmentin at this point.  I would like for her to see a GI doctor to try to determine what kind of colitis we are dealing with.  She is free to advance her diet as tolerated and I think this will actually help bulk up her stools.  Her WBC has been normal through all of this.;    • Osteopenia 04/13/2015    She will due for dexa next year.[id number] Appointment   • Polyp of sigmoid colon     Due for colonoscopy.  She will contact Dr. Harry Atwood's office to set this up.   • Tubular adenoma of colon 07/31/2014    c-scope Dr. Ornelas - repeat scope 5  years     Past Surgical History:   Procedure Laterality Date   • CHOLECYSTECTOMY     • COLONOSCOPY  01/12/2016    Erythematous mucosa in the entire examined colon, diverticulosis in the sigmoid colon, non-bleeding internal hemorrhoids   • MALIGNANT SKIN LESION EXCISION  2006    legs; melanoma   • TUBAL ABDOMINAL LIGATION         Current Outpatient Medications:   •  buPROPion XL (WELLBUTRIN XL) 300 MG 24 hr tablet, Take 1 tablet by mouth Daily., Disp: 90 tablet, Rfl: 1  •  CALCIUM PO, Take by mouth., Disp: , Rfl:   •  Cholecalciferol (VITAMIN D-3) 1000 UNITS capsule, Take by mouth. Take as directed, Disp: , Rfl:   •  dicyclomine (BENTYL) 20 MG tablet, Take 1 tablet by mouth 4 (Four) Times a Day Before Meals & at Bedtime As Needed (diarrhea)., Disp: 180 tablet, Rfl: 2  •  Digestive Enzymes (DIGESTIVE ENZYME PO), Take  by mouth., Disp: , Rfl:   •  fenofibrate (TRICOR) 145 MG tablet, TAKE 1 TABLET BY MOUTH EVERY DAY, Disp: 30 tablet, Rfl: 3  •  levothyroxine (SYNTHROID, LEVOTHROID) 75 MCG tablet, TAKE 1 TABLET BY MOUTH EVERY DAY, Disp: 90 tablet, Rfl: 1  •   melatonin 5 MG tablet tablet, Take by mouth. Take as directed, Disp: , Rfl:   •  Probiotic Product (PROBIOTIC PO), Take  by mouth., Disp: , Rfl:   •  traZODone (DESYREL) 50 MG tablet, Take 1 tablet by mouth every night at bedtime., Disp: 30 tablet, Rfl: 0  •  TURMERIC PO, Take  by mouth., Disp: , Rfl:   •  diclofenac (VOLTAREN) 1 % gel gel, APPLY 2 GRAMS 4 TIMES A DAY TO AFFECTED UPPER EXTREMITY JOINTS, Disp: , Rfl: 2  •  traMADol (ULTRAM) 50 MG tablet, Take 50 mg by mouth Every 6 (Six) Hours As Needed. for pain, Disp: , Rfl: 0  PRN Meds:.  Allergies   Allergen Reactions   • Tetracycline Myalgia     Caused abdominal pain 30 years ago per pt    • Tetracyclines & Related      Social History     Socioeconomic History   • Marital status:      Spouse name: Not on file   • Number of children: Not on file   • Years of education: Not on file   • Highest education level: Not on file   Tobacco Use   • Smoking status: Never Smoker   • Smokeless tobacco: Never Used   Substance and Sexual Activity   • Alcohol use: Yes     Comment: social drinker   • Drug use: No     Family History   Problem Relation Age of Onset   • Cancer Mother         lung   • Coronary artery disease Father    • Glaucoma Father    • Lung cancer Father    • Cancer Father         malignant neoplasm of trachea, bronchus and lung   • Stroke Father    • Heart disease Father      Review of Systems   Constitutional: Negative for appetite change and unexpected weight change.   Gastrointestinal: Negative for abdominal pain and blood in stool.   Musculoskeletal: Positive for neck pain.   All other systems reviewed and are negative.    Vitals:    11/05/19 1409   BP: 132/80   Temp: 98.3 °F (36.8 °C)         11/05/19  1409   Weight: 61.9 kg (136 lb 6.4 oz)       Objective   Physical Exam   Constitutional: She appears well-developed and well-nourished.   HENT:   Head: Normocephalic and atraumatic.   Eyes: No scleral icterus.   Pulmonary/Chest: Effort normal.    Abdominal: Soft. She exhibits no distension.   Neurological: She is alert.   Skin: Skin is warm and dry.   Psychiatric: She has a normal mood and affect.     No images are attached to the encounter.    Assessment/Plan   Diagnoses and all orders for this visit:    Altered bowel habits    Fecal urgency    Other orders  -     traMADol (ULTRAM) 50 MG tablet; Take 50 mg by mouth Every 6 (Six) Hours As Needed. for pain  -     diclofenac (VOLTAREN) 1 % gel gel; APPLY 2 GRAMS 4 TIMES A DAY TO AFFECTED UPPER EXTREMITY JOINTS      Plan:  · Recommend more consistent use of fiber - she tends to hold this at times and she becomes constipated again and the cycle repeats  · Try to avoid bentyl for now - hopefully urgency will improve with more consistent evacuation  · use IBgard prn

## 2019-11-05 NOTE — THERAPY TREATMENT NOTE
Outpatient Physical Therapy Ortho Treatment Note  [facility]     Patient Name: Quyen Jalloh  : 1950  MRN: [id number]  Today's Date: 2019      Visit Date: 2019    Visit Dx:    ICD-10-CM ICD-9-CM   1. Neck pain M54.2 723.1       Patient Active Problem List   Diagnosis   • Adrenal cortical adenoma   • Decreased hearing   • Fatigue   • Hyperlipidemia   • Hypothyroidism   • Insomnia   • Seasonal allergic rhinitis   • Clicking shoulder   • Shoulder pain   • Vitamin D deficiency   • Osteopenia   • Fibromyalgia   • Neck pain   • Microscopic colitis   • Adenomatous polyp of colon        Past Medical History:   Diagnosis Date   • Bloating    • DDD (degenerative disc disease), cervical    • Depression    • Diverticulitis of colon 2016    Impression: 2015 - She had diverticulitis last year.  I think this is a mild flare compared to where she was last year.  Will go ahead and put her on Augmentin for 10 days.  Her WBC is normal now.  Impression: 04/10/2014 - Because of her blood in lucy stool, I am ordering the stat CT.  Will treat as outpt as long as there is not perf seen on CT.  I have put in Augmentin for TX so she doesn't have to use Flagyl.;    • Fibromyalgia     She had been folllowed by Dr. Heineke but he has retired.  She has been unable to find another FM specialist in the area.  She tried seeing Dr. Cordova, but this was a pain clinic and he pushed meds on her that she didn't want to take. She is not interested in narcotics for pain control.  I have agreed to continue her on her current meds and to fill out her disability forms when they come due.  H   • Gallstones    • Hyperlipidemia    • Leukocytosis    • Malignant melanoma of skin (CMS/HCC)    • Non-specific colitis 2016    Impression: 2015 - CT from [facility] reviewed.  There is some generalized edematous wall thickening in lucy entire rectosigmoid colon and some in descending colon.  She is tolerating the  Augmentin at this point.  I would like for her to see a GI doctor to try to determine what kind of colitis we are dealing with.  She is free to advance her diet as tolerated and I think this will actually help bulk up her stools.  Her WBC has been normal through all of this.;    • Osteopenia 04/13/2015    She will due for dexa next year.[id number] Appointment   • Polyp of sigmoid colon     Due for colonoscopy.  She will contact Dr. Harry Atwood's office to set this up.   • Tubular adenoma of colon 07/31/2014    c-scope Dr. Ornelas - repeat scope 5  years        Past Surgical History:   Procedure Laterality Date   • CHOLECYSTECTOMY     • COLONOSCOPY  01/12/2016    Erythematous mucosa in the entire examined colon, diverticulosis in the sigmoid colon, non-bleeding internal hemorrhoids   • MALIGNANT SKIN LESION EXCISION  2006    legs; melanoma   • TUBAL ABDOMINAL LIGATION                         PT Assessment/Plan     Row Name 11/05/19 5061          PT Assessment    Assessment Comments  Patient pleased with improvement in her neck pain but feels pain across her shoulders is getting worse.  Continued with previous ex and manual work.  R shoulder pain limiting patient ability to perform some ther ex.  She has palpable trigger points/knots in R>L UT/levator tissues and notes some relief from manual work.    -RA        PT Plan    PT Plan Comments  Continue working on postural/shoulder girdle strength/stabilitym and thoracic mobility.  May consider seated thoracic extension, prone upper thoracic mobs, trial of SL ER, and possible trial of DDN or traction   -RA       User Key  (r) = Recorded By, (t) = Taken By, (c) = Cosigned By    Initials Name Provider Type    Azra Cross, PT Physical Therapist            OP Exercises     Row Name 11/05/19 1700 11/05/19 1600          Subjective Comments    Subjective Comments  --  Neck is definitely doing but across my shoulders seems worse, especially at the end of the day.    -KAUSHAL   "      Subjective Pain    Able to rate subjective pain?  --  yes  -RA     Pre-Treatment Pain Level  --  4  -RA        Total Minutes    27773 - PT Therapeutic Exercise Minutes  --  20  -RA     40062 - PT Manual Therapy Minutes  28  -RA  --        Exercise 1    Exercise Name 1  --  supine chin tucks  -RA     Reps 1  --  15  -RA     Time 1  --  5s  -RA        Exercise 2    Exercise Name 2  --  supine cervical rotation   -RA     Reps 2  --  15  -RA     Time 2  --  5s  -RA        Exercise 3    Exercise Name 3  --  supine scap ret 50% intensity (\"light shoulder blade imprint\")  -RA     Reps 3  --  15  -RA     Time 3  --  5s  -RA        Exercise 4    Exercise Name 4  --  UBE L1  -RA     Time 4  --  4 min  -RA        Exercise 5    Exercise Name 5  --  supine B ER w/ elt and towels under arms  -RA     Reps 5  --  4  -RA     Time 5  --  30  -RA        Exercise 8    Exercise Name 8  --  Supine OH flexion, L only  -RA     Cueing 8  --  Demo  -RA     Reps 8  --  10  -RA        Exercise 9    Exercise Name 9  --  Seated small range UT stretch  -RA     Cueing 9  --  Demo  -RA     Reps 9  --  3  -RA     Time 9  --  20 sec  -RA       User Key  (r) = Recorded By, (t) = Taken By, (c) = Cosigned By    Initials Name Provider Type    RA Azra Crawford, PT Physical Therapist                      Manual Rx (last 36 hours)      Manual Treatments     Row Name 11/05/19 1700             Total Minutes    50836 - PT Manual Therapy Minutes  28  -RA         Manual Rx 1    Manual Rx 1 Location  Cervical region   -RA      Manual Rx 1 Type  Gentle STM/MFR to suboccipitals, cervical paraspinals, UT, levator tissues, decompressive stroking, gentle cervical distraction; gentle passive UT/levator stretching  -RA         Manual Rx 2    Manual Rx 2 Location  R UT / levator   -RA      Manual Rx 2 Type  ischemic pressure release to palpable knots  -RA        User Key  (r) = Recorded By, (t) = Taken By, (c) = Cosigned By    Initials Name Provider Type    RA " Azra Crawford, PT Physical Therapist              Therapy Education  Given: HEP, Posture/body mechanics, Symptoms/condition management  Program: Reinforced  How Provided: Verbal, Demonstration  Provided to: Patient  Level of Understanding: Teach back education performed, Verbalized              Time Calculation:   Start Time: 1620  Stop Time: 1708  Time Calculation (min): 48 min                Azra Crawford, PT  11/5/2019

## 2019-11-07 ENCOUNTER — APPOINTMENT (OUTPATIENT)
Dept: PHYSICAL THERAPY | Facility: HOSPITAL | Age: 69
End: 2019-11-07

## 2019-11-12 ENCOUNTER — HOSPITAL ENCOUNTER (OUTPATIENT)
Dept: PHYSICAL THERAPY | Facility: HOSPITAL | Age: 69
Setting detail: THERAPIES SERIES
Discharge: HOME | End: 2019-11-12

## 2019-11-12 DIAGNOSIS — M54.2 NECK PAIN: Primary | ICD-10-CM

## 2019-11-12 PROCEDURE — 97110 THERAPEUTIC EXERCISES: CPT

## 2019-11-12 PROCEDURE — 97140 MANUAL THERAPY 1/> REGIONS: CPT

## 2019-11-12 NOTE — THERAPY TREATMENT NOTE
Outpatient Physical Therapy Ortho Treatment Note  [facility]     Patient Name: Quyen Jalloh  : 1950  MRN: [id number]  Today's Date: 2019      Visit Date: 2019    Visit Dx:    ICD-10-CM ICD-9-CM   1. Neck pain M54.2 723.1       Patient Active Problem List   Diagnosis   • Adrenal cortical adenoma   • Decreased hearing   • Fatigue   • Hyperlipidemia   • Hypothyroidism   • Insomnia   • Seasonal allergic rhinitis   • Clicking shoulder   • Shoulder pain   • Vitamin D deficiency   • Osteopenia   • Fibromyalgia   • Neck pain   • Microscopic colitis   • Adenomatous polyp of colon        Past Medical History:   Diagnosis Date   • Bloating    • DDD (degenerative disc disease), cervical    • Depression    • Diverticulitis of colon 2016    Impression: 2015 - She had diverticulitis last year.  I think this is a mild flare compared to where she was last year.  Will go ahead and put her on Augmentin for 10 days.  Her WBC is normal now.  Impression: 04/10/2014 - Because of her blood in lucy stool, I am ordering the stat CT.  Will treat as outpt as long as there is not perf seen on CT.  I have put in Augmentin for TX so she doesn't have to use Flagyl.;    • Fibromyalgia     She had been folllowed by Dr. Heineke but he has retired.  She has been unable to find another FM specialist in the area.  She tried seeing Dr. Cordova, but this was a pain clinic and he pushed meds on her that she didn't want to take. She is not interested in narcotics for pain control.  I have agreed to continue her on her current meds and to fill out her disability forms when they come due.  H   • Gallstones    • Hyperlipidemia    • Leukocytosis    • Malignant melanoma of skin (CMS/HCC)    • Non-specific colitis 2016    Impression: 2015 - CT from [facility] reviewed.  There is some generalized edematous wall thickening in lucy entire rectosigmoid colon and some in descending colon.  She is tolerating the  Augmentin at this point.  I would like for her to see a GI doctor to try to determine what kind of colitis we are dealing with.  She is free to advance her diet as tolerated and I think this will actually help bulk up her stools.  Her WBC has been normal through all of this.;    • Osteopenia 04/13/2015    She will due for dexa next year.[id number] Appointment   • Polyp of sigmoid colon     Due for colonoscopy.  She will contact Dr. Harry Atwood's office to set this up.   • Tubular adenoma of colon 07/31/2014    c-scope Dr. Ornelas - repeat scope 5  years        Past Surgical History:   Procedure Laterality Date   • CHOLECYSTECTOMY     • COLONOSCOPY  01/12/2016    Erythematous mucosa in the entire examined colon, diverticulosis in the sigmoid colon, non-bleeding internal hemorrhoids   • MALIGNANT SKIN LESION EXCISION  2006    legs; melanoma   • TUBAL ABDOMINAL LIGATION                         PT Assessment/Plan     Row Name 11/12/19 5925          PT Assessment    Assessment Comments  Pt reporting decreased cervical symptoms with remaining shoulder pain. Held on DDN as pt easily flares up and unsure if pt would respond positively to treatment. Continued with current treatment plan for postural strength and stability.   -CN        PT Plan    PT Plan Comments  Continue with current program and consider prone t/s mobs and SL ER next visit.   -CN       User Key  (r) = Recorded By, (t) = Taken By, (c) = Cosigned By    Initials Name Provider Type    Denisha Hernandez, PT Physical Therapist          Modalities     Row Name 11/12/19 1300             Moist Heat    Location  cervical/thoracic spine in supine decompression (legs over over bolsterl).  -CN      Rx Minutes  12 mins  -CN      MH S/P Rx  Yes  -CN        User Key  (r) = Recorded By, (t) = Taken By, (c) = Cosigned By    Initials Name Provider Type    Denisha Hernandez, PT Physical Therapist        OP Exercises     Row Name 11/12/19 1300              "Subjective Comments    Subjective Comments  I am definitely getting better, but I still have that place in my shoulder.   -CN         Subjective Pain    Able to rate subjective pain?  yes  -CN      Pre-Treatment Pain Level  4  -CN         Total Minutes    93486 - PT Therapeutic Exercise Minutes  20  -CN      82738 - PT Manual Therapy Minutes  25  -CN         Exercise 1    Exercise Name 1  supine chin tucks  -CN      Reps 1  15  -CN      Time 1  5s  -CN         Exercise 2    Exercise Name 2  supine cervical rotation   -CN      Reps 2  15  -CN      Time 2  5s  -CN         Exercise 3    Exercise Name 3  supine scap ret 50% intensity (\"light shoulder blade imprint\")  -CN      Reps 3  15  -CN      Time 3  5s  -CN         Exercise 4    Exercise Name 4  UBE L1  -CN      Time 4  4 min  -CN         Exercise 5    Exercise Name 5  supine B ER w/ elt and towels under arms  -CN      Reps 5  4  -CN      Time 5  30  -CN         Exercise 8    Exercise Name 8  Supine OH flexion, L only  -CN      Cueing 8  Demo  -CN      Reps 8  10  -CN         Exercise 9    Exercise Name 9  Seated small range UT stretch  -CN      Cueing 9  Demo  -CN      Reps 9  3  -CN      Time 9  20 sec  -CN        User Key  (r) = Recorded By, (t) = Taken By, (c) = Cosigned By    Initials Name Provider Type    CN Denisha Worrell, PT Physical Therapist                      Manual Rx (last 36 hours)      Manual Treatments     Row Name 11/12/19 1300             Total Minutes    58891 - PT Manual Therapy Minutes  25  -CN         Manual Rx 1    Manual Rx 1 Location  Cervical region   -CN      Manual Rx 1 Type  Gentle STM/MFR to suboccipitals, cervical paraspinals, UT, levator tissues, decompressive stroking, gentle cervical distraction; gentle passive UT/levator stretching  -CN         Manual Rx 2    Manual Rx 2 Location  R UT / levator   -CN      Manual Rx 2 Type  ischemic pressure release to palpable knots  -CN        User Key  (r) = Recorded By, (t) = Taken " By, (c) = Cosigned By    Initials Name Provider Type    Denisha Hernandez, PT Physical Therapist          PT OP Goals     Row Name 11/12/19 1500          PT Short Term Goals    STG 1  Patient will be independent with initial HEP for posture and ROM/flexibility without increased symptoms  -CN     STG 1 Progress  Partially Met  -CN     STG 2  Patient will be educated on and demonstrate knowledge of optimal posture, decompression techniques, stress management/relaxation strategies, ergonomics, good body mechanics, and proper lifting techniques to minimize stress to spine and soft tissues with daily function  -CN     STG 2 Progress  Progressing  -CN     STG 2 Progress Comments  Pt reporting improve relaxation techniques for decreased muscle guarding.   -CN     STG 3  Patient will report >/= 25% increased ease/decreased pain with daily functional activities  -CN     STG 3 Progress  Ongoing  -CN        Long Term Goals    LTG 1  Patient will be independent with established HEP for strength/stabilization to aid in self management of symptoms/condition  -CN     LTG 1 Progress  Ongoing  -CN     LTG 2  Patient will have >/= 50% cervical AROM with minimal pain for safety and increased ease with driving.  -CN     LTG 2 Progress  Partially Met  -CN     LTG 3  Patient will have postural/shoulder girdle strength at least 4/5 for increased stability/tolerance with home/yard management   -CN     LTG 3 Progress  Ongoing  -CN     LTG 4  Patient will have pain </= 5/10 with performance of household/community/leisure/recreational activities.  -CN     LTG 4 Progress  Ongoing  -CN     LTG 5  Patient will report >/= 50% improvement in quality/quantity of sleep   -CN     LTG 5 Progress  Ongoing  -CN       User Key  (r) = Recorded By, (t) = Taken By, (c) = Cosigned By    Initials Name Provider Type    Denisha Hernandez, KAY Physical Therapist          Therapy Education  Given: HEP, Posture/body mechanics, Symptoms/condition  management  Program: Reinforced  How Provided: Verbal, Demonstration  Provided to: Patient  Level of Understanding: Teach back education performed, Verbalized              Time Calculation:   Start Time: 1345  Stop Time: 1440  Time Calculation (min): 55 min  Therapy Charges for Today     Code Description Service Date Service Provider Modifiers Qty    [id number]  PT THER PROC EA 15 MIN 11/12/2019 Denisha Worrell, PT GP 1    [id number]  PT MANUAL THERAPY EA 15 MIN 11/12/2019 Denisha Worrell, PT GP 2    [id number]  PT HOT OR COLD PACK TREAT MCARE 11/12/2019 Denisha Worrell, PT GP 1                    Denisha Worrell, PT  11/12/2019

## 2019-11-14 ENCOUNTER — APPOINTMENT (OUTPATIENT)
Dept: PHYSICAL THERAPY | Facility: HOSPITAL | Age: 69
End: 2019-11-14

## 2019-11-25 ENCOUNTER — APPOINTMENT (OUTPATIENT)
Dept: PHYSICAL THERAPY | Facility: HOSPITAL | Age: 69
End: 2019-11-25

## 2019-11-25 ENCOUNTER — TELEPHONE (OUTPATIENT)
Dept: GASTROENTEROLOGY | Facility: CLINIC | Age: 69
End: 2019-11-25

## 2019-11-25 ENCOUNTER — APPOINTMENT (OUTPATIENT)
Dept: LAB | Facility: HOSPITAL | Age: 69
End: 2019-11-25

## 2019-11-25 LAB — TSH SERPL DL<=0.05 MIU/L-ACNC: 3.42 UIU/ML (ref 0.27–4.2)

## 2019-11-25 PROCEDURE — 84443 ASSAY THYROID STIM HORMONE: CPT | Performed by: INTERNAL MEDICINE

## 2019-11-25 PROCEDURE — 36415 COLL VENOUS BLD VENIPUNCTURE: CPT

## 2019-12-05 ENCOUNTER — OFFICE VISIT (OUTPATIENT)
Dept: GASTROENTEROLOGY | Facility: CLINIC | Age: 69
End: 2019-12-05

## 2019-12-05 VITALS
SYSTOLIC BLOOD PRESSURE: 124 MMHG | HEIGHT: 61 IN | BODY MASS INDEX: 25.68 KG/M2 | DIASTOLIC BLOOD PRESSURE: 76 MMHG | TEMPERATURE: 98.3 F | WEIGHT: 136 LBS

## 2019-12-05 DIAGNOSIS — K62.89 ANAL OR RECTAL PAIN: ICD-10-CM

## 2019-12-05 DIAGNOSIS — D72.829 LEUKOCYTOSIS, UNSPECIFIED TYPE: Primary | ICD-10-CM

## 2019-12-05 DIAGNOSIS — R10.30 LOWER ABDOMINAL PAIN: ICD-10-CM

## 2019-12-05 PROCEDURE — 99213 OFFICE O/P EST LOW 20 MIN: CPT | Performed by: INTERNAL MEDICINE

## 2019-12-05 NOTE — PROGRESS NOTES
Subjective   Chief Complaint   Patient presents with   • Follow-up   • Abdominal Pain   • Irritable Bowel Syndrome       Quyen Jalloh is a  69 y.o. female here for a follow up visit for abdominal pain.     She recently had to go to the ER in [place] - wbc ct 18K, CT reportedly normal with contrast (body of the report describes changes associated with diverticulitis however conclusion states no diverticulitis).    She was having significant rectal spasm - at one point she had nausea, diaphoresis and felt like she would faint.  Thereafter she was having copious amounts of mucus from her rectum.  She is been treated with now 2 antibiotics and is improving.  She never saw any blood in her stool.  She is not having diarrhea and she has not been constipated.  Her stools have been soft.            HPI  Past Medical History:   Diagnosis Date   • Bloating    • DDD (degenerative disc disease), cervical    • Depression    • Diverticulitis of colon 1/28/2016    Impression: 11/18/2015 - She had diverticulitis last year.  I think this is a mild flare compared to where she was last year.  Will go ahead and put her on Augmentin for 10 days.  Her WBC is normal now.  Impression: 04/10/2014 - Because of her blood in lucy stool, I am ordering the stat CT.  Will treat as outpt as long as there is not perf seen on CT.  I have put in Augmentin for TX so she doesn't have to use Flagyl.;    • Fibromyalgia     She had been folllowed by Dr. Heineke but he has retired.  She has been unable to find another FM specialist in the area.  She tried seeing Dr. Cordova, but this was a pain clinic and he pushed meds on her that she didn't want to take. She is not interested in narcotics for pain control.  I have agreed to continue her on her current meds and to fill out her disability forms when they come due.  H   • Gallstones    • Hyperlipidemia    • Leukocytosis    • Malignant melanoma of skin (CMS/HCC)    • Non-specific colitis 1/28/2016     Impression: 12/07/2015 - CT from [facility] reviewed.  There is some generalized edematous wall thickening in lucy entire rectosigmoid colon and some in descending colon.  She is tolerating the Augmentin at this point.  I would like for her to see a GI doctor to try to determine what kind of colitis we are dealing with.  She is free to advance her diet as tolerated and I think this will actually help bulk up her stools.  Her WBC has been normal through all of this.;    • Osteopenia 04/13/2015    She will due for dexa next year.[id number] Appointment   • Polyp of sigmoid colon     Due for colonoscopy.  She will contact Dr. Harry Atwood's office to set this up.   • Tubular adenoma of colon 07/31/2014    c-scope Dr. Ornelas - repeat scope 5  years     Past Surgical History:   Procedure Laterality Date   • CHOLECYSTECTOMY     • COLONOSCOPY  01/12/2016    Erythematous mucosa in the entire examined colon, diverticulosis in the sigmoid colon, non-bleeding internal hemorrhoids   • MALIGNANT SKIN LESION EXCISION  2006    legs; melanoma   • TUBAL ABDOMINAL LIGATION         Current Outpatient Medications:   •  buPROPion XL (WELLBUTRIN XL) 300 MG 24 hr tablet, Take 1 tablet by mouth Daily., Disp: 90 tablet, Rfl: 1  •  CALCIUM PO, Take by mouth., Disp: , Rfl:   •  Cholecalciferol (VITAMIN D-3) 1000 UNITS capsule, Take by mouth. Take as directed, Disp: , Rfl:   •  dicyclomine (BENTYL) 20 MG tablet, Take 1 tablet by mouth 4 (Four) Times a Day Before Meals & at Bedtime As Needed (diarrhea)., Disp: 180 tablet, Rfl: 2  •  Digestive Enzymes (DIGESTIVE ENZYME PO), Take  by mouth., Disp: , Rfl:   •  fenofibrate (TRICOR) 145 MG tablet, TAKE 1 TABLET BY MOUTH EVERY DAY, Disp: 30 tablet, Rfl: 3  •  levothyroxine (SYNTHROID, LEVOTHROID) 75 MCG tablet, TAKE 1 TABLET BY MOUTH EVERY DAY, Disp: 90 tablet, Rfl: 1  •  melatonin 5 MG tablet tablet, Take by mouth. Take as directed, Disp: , Rfl:   •  Probiotic Product (PROBIOTIC PO), Take  by mouth.,  Disp: , Rfl:   •  traZODone (DESYREL) 50 MG tablet, Take 1 tablet by mouth every night at bedtime., Disp: 30 tablet, Rfl: 0  •  diclofenac (VOLTAREN) 1 % gel gel, APPLY 2 GRAMS 4 TIMES A DAY TO AFFECTED UPPER EXTREMITY JOINTS, Disp: , Rfl: 2  •  traMADol (ULTRAM) 50 MG tablet, Take 50 mg by mouth Every 6 (Six) Hours As Needed. for pain, Disp: , Rfl: 0  •  TURMERIC PO, Take  by mouth., Disp: , Rfl:   PRN Meds:.  Allergies   Allergen Reactions   • Tetracycline Myalgia     Caused abdominal pain 30 years ago per pt    • Tetracyclines & Related      Social History     Socioeconomic History   • Marital status:      Spouse name: Not on file   • Number of children: Not on file   • Years of education: Not on file   • Highest education level: Not on file   Tobacco Use   • Smoking status: Never Smoker   • Smokeless tobacco: Never Used   Substance and Sexual Activity   • Alcohol use: Yes     Comment: social drinker   • Drug use: No     Family History   Problem Relation Age of Onset   • Cancer Mother         lung   • Coronary artery disease Father    • Glaucoma Father    • Lung cancer Father    • Cancer Father         malignant neoplasm of trachea, bronchus and lung   • Stroke Father    • Heart disease Father      Review of Systems   Constitutional: Positive for appetite change. Negative for fever and unexpected weight change.   Gastrointestinal: Positive for abdominal pain and rectal pain. Negative for blood in stool.   All other systems reviewed and are negative.    Vitals:    12/05/19 1154   BP: 124/76   Temp: 98.3 °F (36.8 °C)         12/05/19  1154   Weight: 61.7 kg (136 lb)       Objective   Physical Exam   Constitutional: She appears well-developed and well-nourished.   HENT:   Head: Normocephalic and atraumatic.   Eyes: No scleral icterus.   Abdominal: Soft. She exhibits no distension and no mass. There is no tenderness.   Neurological: She is alert.   Skin: Skin is warm and dry.   Psychiatric: She has a normal  mood and affect.     No images are attached to the encounter.    Assessment/Plan   Diagnoses and all orders for this visit:    Leukocytosis, unspecified type  -     CBC & Differential    Anal or rectal pain    Lower abdominal pain      Plan:  · Symptoms are improving on antibiotics.  · We will recheck her CBC today to ensure that her white count has normalized  · I suspected that she had had diverticulitis and that her report had been mistranscribed.  I spoke with [facility] today and a radiologist there reviewed her films and confirmed that she did indeed have diverticulitis.  Her report will be amended.  She has at this point been treated adequately.  As long as she continues to improve no further treatment is necessary.

## 2019-12-06 ENCOUNTER — TELEPHONE (OUTPATIENT)
Dept: GASTROENTEROLOGY | Facility: CLINIC | Age: 69
End: 2019-12-06

## 2019-12-06 LAB
BASOPHILS # BLD AUTO: 0.08 10*3/MM3 (ref 0–0.2)
BASOPHILS NFR BLD AUTO: 0.9 % (ref 0–1.5)
EOSINOPHIL # BLD AUTO: 0.25 10*3/MM3 (ref 0–0.4)
EOSINOPHIL NFR BLD AUTO: 2.9 % (ref 0.3–6.2)
ERYTHROCYTE [DISTWIDTH] IN BLOOD BY AUTOMATED COUNT: 12 % (ref 12.3–15.4)
HCT VFR BLD AUTO: 39 % (ref 34–46.6)
HGB BLD-MCNC: 12.7 G/DL (ref 12–15.9)
IMM GRANULOCYTES # BLD AUTO: 0.12 10*3/MM3 (ref 0–0.05)
IMM GRANULOCYTES NFR BLD AUTO: 1.4 % (ref 0–0.5)
LYMPHOCYTES # BLD AUTO: 2.18 10*3/MM3 (ref 0.7–3.1)
LYMPHOCYTES NFR BLD AUTO: 25.5 % (ref 19.6–45.3)
MCH RBC QN AUTO: 28.3 PG (ref 26.6–33)
MCHC RBC AUTO-ENTMCNC: 32.6 G/DL (ref 31.5–35.7)
MCV RBC AUTO: 87.1 FL (ref 79–97)
MONOCYTES # BLD AUTO: 0.59 10*3/MM3 (ref 0.1–0.9)
MONOCYTES NFR BLD AUTO: 6.9 % (ref 5–12)
NEUTROPHILS # BLD AUTO: 5.32 10*3/MM3 (ref 1.7–7)
NEUTROPHILS NFR BLD AUTO: 62.4 % (ref 42.7–76)
NRBC BLD AUTO-RTO: 0 /100 WBC (ref 0–0.2)
PLATELET # BLD AUTO: 431 10*3/MM3 (ref 140–450)
RBC # BLD AUTO: 4.48 10*6/MM3 (ref 3.77–5.28)
WBC # BLD AUTO: 8.54 10*3/MM3 (ref 3.4–10.8)

## 2019-12-06 NOTE — TELEPHONE ENCOUNTER
----- Message from Ly Zavala MD sent at 12/6/2019 10:20 AM EST -----  Please let her know that her labs have normalized.  Please request CT report from the hospital she visited in [place] recently

## 2019-12-06 NOTE — TELEPHONE ENCOUNTER
Call to pt.  Advise per Dr Zavala that labs have normalized.  Request CT from hosp visited in [place] recently.    Verb understanding - states dropped off disc.  Located - to DR Zavala.  Pt states has paper copy - will drop off Monday to this RN attention.

## 2019-12-09 NOTE — TELEPHONE ENCOUNTER
Please let her know that I spoke with the radiologist at [facility] in [place] and they confirmed that she did indeed have sigmoid diverticulitis.  They will be amending her report.  I do have a copy of the disc which I will keep for her until I see her again.  I expect that this was the cause of all of the symptoms that she experienced.

## 2019-12-10 NOTE — TELEPHONE ENCOUNTER
Called pt and advised per Dr Zavala that she spoke with the radiologsit at [facility] in [place] and they confirmed that she did indeed have sigmoid diverticulitis.  They will be amending her report.  She does have a copy of the disc which she will keep until she see her again.. She suspects that this was the cause of all of the symptoms that she experienced. Pt verb understanding.

## 2020-01-07 ENCOUNTER — HOSPITAL ENCOUNTER (OUTPATIENT)
Dept: PHYSICAL THERAPY | Facility: HOSPITAL | Age: 70
Setting detail: THERAPIES SERIES
Discharge: HOME | End: 2020-01-07

## 2020-01-07 DIAGNOSIS — M54.2 NECK PAIN: Primary | ICD-10-CM

## 2020-01-07 PROCEDURE — 97110 THERAPEUTIC EXERCISES: CPT

## 2020-01-07 PROCEDURE — 97140 MANUAL THERAPY 1/> REGIONS: CPT

## 2020-01-08 NOTE — THERAPY PROGRESS REPORT/RE-CERT
Outpatient Physical Therapy Ortho Re-Assessment  [facility]     Patient Name: Quyen Jalloh  : 1950  MRN: [id number]  Today's Date: 2020      Visit Date: 2020    Patient Active Problem List   Diagnosis   • Adrenal cortical adenoma   • Decreased hearing   • Fatigue   • Hyperlipidemia   • Hypothyroidism   • Insomnia   • Seasonal allergic rhinitis   • Clicking shoulder   • Shoulder pain   • Vitamin D deficiency   • Osteopenia   • Fibromyalgia   • Neck pain   • Microscopic colitis   • Adenomatous polyp of colon        Past Medical History:   Diagnosis Date   • Bloating    • DDD (degenerative disc disease), cervical    • Depression    • Diverticulitis of colon 2016    Impression: 2015 - She had diverticulitis last year.  I think this is a mild flare compared to where she was last year.  Will go ahead and put her on Augmentin for 10 days.  Her WBC is normal now.  Impression: 04/10/2014 - Because of her blood in lucy stool, I am ordering the stat CT.  Will treat as outpt as long as there is not perf seen on CT.  I have put in Augmentin for TX so she doesn't have to use Flagyl.;    • Fibromyalgia     She had been folllowed by Dr. Heineke but he has retired.  She has been unable to find another FM specialist in the area.  She tried seeing Dr. Cordova, but this was a pain clinic and he pushed meds on her that she didn't want to take. She is not interested in narcotics for pain control.  I have agreed to continue her on her current meds and to fill out her disability forms when they come due.  H   • Gallstones    • Hyperlipidemia    • Leukocytosis    • Malignant melanoma of skin (CMS/HCC)    • Non-specific colitis 2016    Impression: 2015 - CT from [facility] reviewed.  There is some generalized edematous wall thickening in lucy entire rectosigmoid colon and some in descending colon.  She is tolerating the Augmentin at this point.  I would like for her to see a GI doctor to try to  determine what kind of colitis we are dealing with.  She is free to advance her diet as tolerated and I think this will actually help bulk up her stools.  Her WBC has been normal through all of this.;    • Osteopenia 04/13/2015    She will due for dexa next year.[id number] Appointment   • Polyp of sigmoid colon     Due for colonoscopy.  She will contact Dr. Harry Atwood's office to set this up.   • Tubular adenoma of colon 07/31/2014    c-scope Dr. Ornelas - repeat scope 5  years        Past Surgical History:   Procedure Laterality Date   • CHOLECYSTECTOMY     • COLONOSCOPY  01/12/2016    Erythematous mucosa in the entire examined colon, diverticulosis in the sigmoid colon, non-bleeding internal hemorrhoids   • MALIGNANT SKIN LESION EXCISION  2006    legs; melanoma   • TUBAL ABDOMINAL LIGATION         Visit Dx:     ICD-10-CM ICD-9-CM   1. Neck pain M54.2 723.1                             Therapy Education  Given: HEP, Posture/body mechanics, Symptoms/condition management  Program: Reinforced  How Provided: Verbal, Demonstration  Provided to: Patient  Level of Understanding: Teach back education performed, Verbalized     PT OP Goals     Row Name 01/07/20 1300          PT Short Term Goals    STG 1  Patient will be independent with initial HEP for posture and ROM/flexibility without increased symptoms  -CN     STG 1 Progress  Partially Met  -CN     STG 2  Patient will be educated on and demonstrate knowledge of optimal posture, decompression techniques, stress management/relaxation strategies, ergonomics, good body mechanics, and proper lifting techniques to minimize stress to spine and soft tissues with daily function  -CN     STG 2 Progress  Progressing  -CN     STG 2 Progress Comments  Discussed posture and relaxation techniques to improve body mechanics with ADLs.   -CN     STG 3  Patient will report >/= 25% increased ease/decreased pain with daily functional activities  -CN     STG 3 Progress  Ongoing  -CN     STG 3  Progress Comments  Decreased ability to perform ADLs compared to last session.   -CN        Long Term Goals    LTG 1  Patient will be independent with established HEP for strength/stabilization to aid in self management of symptoms/condition  -CN     LTG 1 Progress  Ongoing  -CN     LTG 1 Progress Comments  Held on progression today.   -CN     LTG 2  Patient will have >/= 50% cervical AROM with minimal pain for safety and increased ease with driving.  -CN     LTG 2 Progress  Partially Met  -CN     LTG 3  Patient will have postural/shoulder girdle strength at least 4/5 for increased stability/tolerance with home/yard management   -CN     LTG 3 Progress  Ongoing  -CN     LTG 4  Patient will have pain </= 5/10 with performance of household/community/leisure/recreational activities.  -CN     LTG 4 Progress  Ongoing  -CN     LTG 4 Progress Comments  Pt reports 6-7/10 pain with household chores.   -CN     LTG 5  Patient will report >/= 50% improvement in quality/quantity of sleep   -CN     LTG 5 Progress  Met  -CN     LTG 5 Progress Comments  Pt reports no difficulty sleeping at this time.   -CN       User Key  (r) = Recorded By, (t) = Taken By, (c) = Cosigned By    Initials Name Provider Type    Denisha Hernandez, PT Physical Therapist          PT Assessment/Plan     Row Name 01/07/20 3335          PT Assessment    Functional Limitations  Limitations in community activities;Performance in leisure activities;Limitation in home management  -CN     Impairments  Impaired flexibility;Muscle strength;Pain;Posture;Range of motion  -CN     Assessment Comments  Pt has attended 11 skilled therapy sessions for treatment of shoulder/cervical pain. Symptoms were improving until recent hiatus in care due to trip with subsequent sickness as well as insurance/billing questions. Pt states that symptoms have increased since last visit, however not as significant as at time of evaluation. Pt continues to avoid OH movement and  difficulty with ADLs due to pain. Pt without difficulty sleeping at this time and continues to work on relaxation strategies to decrease stress on affected tissues. Pt would benefit from continued skilled PT to address remaining deficits and return to PLOF.   -CN     Please refer to paper survey for additional self-reported information  Yes  -CN     Rehab Potential  Good  -CN     Patient/caregiver participated in establishment of treatment plan and goals  Yes  -CN     Patient would benefit from skilled therapy intervention  Yes  -CN        PT Plan    PT Frequency  2x/week  -CN     Predicted Duration of Therapy Intervention (Therapy Eval)  4 weeks  -CN     PT Plan Comments  Pt to continue treating for progressive postural and shoulder flexibility/stability. Consider [facility] ER next visit.   -CN       User Key  (r) = Recorded By, (t) = Taken By, (c) = Cosigned By    Initials Name Provider Type    Denisha Hernandez, KAY Physical Therapist          Modalities     Row Name 01/07/20 1100             Moist Heat    Location  cervical/thoracic spine in supine decompression (legs over over bolsterl).  -CN      Rx Minutes  12 mins  -CN      MH S/P Rx  Yes  -CN        User Key  (r) = Recorded By, (t) = Taken By, (c) = Cosigned By    Initials Name Provider Type    Denisha Hernandez, PT Physical Therapist        OP Exercises     Row Name 01/07/20 1200 01/07/20 1100          Subjective Comments    Subjective Comments  Well, I haven't been in a while because I got sick then I was trying to figure out how much I owe from the billing department.   -CN  --        Subjective Pain    Able to rate subjective pain?  yes  -CN  --     Pre-Treatment Pain Level  5  -CN  --        Total Minutes    41723 - PT Therapeutic Exercise Minutes  20  -CN  --     45383 - PT Manual Therapy Minutes  --  25  -CN        Exercise 1    Exercise Name 1  supine chin tucks  -CN  --     Reps 1  15  -CN  --     Time 1  5s  -CN  --        Exercise 2     "Exercise Name 2  supine cervical rotation   -CN  --     Reps 2  15  -CN  --     Time 2  5s  -CN  --        Exercise 3    Exercise Name 3  supine scap ret 50% intensity (\"light shoulder blade imprint\")  -CN  --     Reps 3  15  -CN  --     Time 3  5s  -CN  --        Exercise 4    Exercise Name 4  UBE L1  -CN  --     Time 4  4 min  -CN  --        Exercise 5    Exercise Name 5  supine B ER w/ elt and towels under arms  -CN  --     Reps 5  4  -CN  --     Time 5  30  -CN  --        Exercise 8    Exercise Name 8  Supine OH flexion  -CN  --     Cueing 8  Demo  -CN  --     Reps 8  10 alt  -CN  --        Exercise 9    Exercise Name 9  Seated small range UT stretch  -CN  --     Cueing 9  Demo  -CN  --     Reps 9  3  -CN  --     Time 9  20 sec  -CN  --       User Key  (r) = Recorded By, (t) = Taken By, (c) = Cosigned By    Initials Name Provider Type    Denisha Hernandez, KAY Physical Therapist           Manual Rx (last 36 hours)      Manual Treatments     Row Name 01/07/20 1100             Total Minutes    26065 - PT Manual Therapy Minutes  25  -CN         Manual Rx 1    Manual Rx 1 Location  Cervical region   -CN      Manual Rx 1 Type  Gentle STM/MFR to suboccipitals, cervical paraspinals, UT, levator tissues, decompressive stroking, gentle cervical distraction; gentle passive UT/levator stretching  -CN         Manual Rx 2    Manual Rx 2 Location  R UT / levator   -CN      Manual Rx 2 Type  ischemic pressure release to palpable knots  -CN        User Key  (r) = Recorded By, (t) = Taken By, (c) = Cosigned By    Initials Name Provider Type    Denisha Hernandez, KAY Physical Therapist                      Outcome Measure Options: Neck Disability Index (NDI)(44% where 0% is no disability)         Time Calculation:     Start Time: 1245  Stop Time: 1340  Time Calculation (min): 55 min     Therapy Charges for Today     Code Description Service Date Service Provider Modifiers Qty    54176277601  PT THER PROC EA 15 " MIN 1/7/2020 Denisha Worrell, PT GP 1    36535246009 HC PT MANUAL THERAPY EA 15 MIN 1/7/2020 Denisha Worrell, PT GP 2    43324684558 HC PT HOT OR COLD PACK TREAT MCARE 1/7/2020 Denisha Worrell, PT GP 1          PT G-Codes  Outcome Measure Options: Neck Disability Index (NDI)(44% where 0% is no disability)         Denisha Worrell, PT  1/7/2020

## 2020-01-15 ENCOUNTER — APPOINTMENT (OUTPATIENT)
Dept: PHYSICAL THERAPY | Facility: HOSPITAL | Age: 70
End: 2020-01-15

## 2020-01-16 ENCOUNTER — HOSPITAL ENCOUNTER (OUTPATIENT)
Dept: PHYSICAL THERAPY | Facility: HOSPITAL | Age: 70
Setting detail: THERAPIES SERIES
Discharge: HOME | End: 2020-01-16

## 2020-01-16 DIAGNOSIS — M54.2 NECK PAIN: Primary | ICD-10-CM

## 2020-01-16 PROCEDURE — 97140 MANUAL THERAPY 1/> REGIONS: CPT | Performed by: PHYSICAL THERAPIST

## 2020-01-16 PROCEDURE — 97110 THERAPEUTIC EXERCISES: CPT | Performed by: PHYSICAL THERAPIST

## 2020-01-16 NOTE — THERAPY TREATMENT NOTE
Outpatient Physical Therapy Ortho Treatment Note  [facility]     Patient Name: Quyen Jalloh  : 1950  MRN: [id number]  Today's Date: 2020      Visit Date: 2020    Visit Dx:    ICD-10-CM ICD-9-CM   1. Neck pain M54.2 723.1       Patient Active Problem List   Diagnosis   • Adrenal cortical adenoma   • Decreased hearing   • Fatigue   • Hyperlipidemia   • Hypothyroidism   • Insomnia   • Seasonal allergic rhinitis   • Clicking shoulder   • Shoulder pain   • Vitamin D deficiency   • Osteopenia   • Fibromyalgia   • Neck pain   • Microscopic colitis   • Adenomatous polyp of colon        Past Medical History:   Diagnosis Date   • Bloating    • DDD (degenerative disc disease), cervical    • Depression    • Diverticulitis of colon 2016    Impression: 2015 - She had diverticulitis last year.  I think this is a mild flare compared to where she was last year.  Will go ahead and put her on Augmentin for 10 days.  Her WBC is normal now.  Impression: 04/10/2014 - Because of her blood in lucy stool, I am ordering the stat CT.  Will treat as outpt as long as there is not perf seen on CT.  I have put in Augmentin for TX so she doesn't have to use Flagyl.;    • Fibromyalgia     She had been folllowed by Dr. Heineke but he has retired.  She has been unable to find another FM specialist in the area.  She tried seeing Dr. Cordova, but this was a pain clinic and he pushed meds on her that she didn't want to take. She is not interested in narcotics for pain control.  I have agreed to continue her on her current meds and to fill out her disability forms when they come due.  H   • Gallstones    • Hyperlipidemia    • Leukocytosis    • Malignant melanoma of skin (CMS/HCC)    • Non-specific colitis 2016    Impression: 2015 - CT from [facility] reviewed.  There is some generalized edematous wall thickening in lucy entire rectosigmoid colon and some in descending colon.  She is tolerating the  "Augmentin at this point.  I would like for her to see a GI doctor to try to determine what kind of colitis we are dealing with.  She is free to advance her diet as tolerated and I think this will actually help bulk up her stools.  Her WBC has been normal through all of this.;    • Osteopenia 04/13/2015    She will due for dexa next year.[id number] Appointment   • Polyp of sigmoid colon     Due for colonoscopy.  She will contact Dr. Harry Atwood's office to set this up.   • Tubular adenoma of colon 07/31/2014    c-scope Dr. Ornelas - repeat scope 5  years        Past Surgical History:   Procedure Laterality Date   • CHOLECYSTECTOMY     • COLONOSCOPY  01/12/2016    Erythematous mucosa in the entire examined colon, diverticulosis in the sigmoid colon, non-bleeding internal hemorrhoids   • MALIGNANT SKIN LESION EXCISION  2006    legs; melanoma   • TUBAL ABDOMINAL LIGATION                         PT Assessment/Plan     Row Name 01/16/20 1330          PT Assessment    Assessment Comments  Patient reporting increased symptoms in neck and R shoulder since last treatment session secondary to lifting[place]ering her 40lb [place] the zoo. Progressed scapular strengthening program with light resistance. Also added gentle noodle work to promote thoracic extension and pectoral opening. Patient has a histroy of Fibro and thus reports she will \"feel it\" for a day or two and she cannot take NSAIDS secondary to stomach/GI issue.   -        PT Plan    PT Plan Comments  sidelying thoracic open books  -       User Key  (r) = Recorded By, (t) = Taken By, (c) = Cosigned By    Initials Name Provider Type    Jt Lee, PT Physical Therapist            OP Exercises     Row Name 01/16/20 1300 01/16/20 1200          Subjective Comments    Subjective Comments  --  I took my granddaughter to the zoo and picked her up then lowered her down from something and my shoulder has been killing me sense.   -        Subjective Pain    " Able to rate subjective pain?  --  yes  -     Pre-Treatment Pain Level  --  7  -CJ        Total Minutes    52607 - PT Therapeutic Exercise Minutes  --  30  -CJ     08566 - PT Manual Therapy Minutes  15  -CJ  --        Exercise 1    Exercise Name 1  --  supine chin tuck with rotation  -CJ     Reps 1  --  10  -CJ     Time 1  --  3 sec  -CJ        Exercise 2    Exercise Name 2  --  Shldr ext, YTB  -CJ     Reps 2  --  15  -CJ        Exercise 3    Exercise Name 3  --  rows, YTB  -     Reps 3  --  15  -CJ        Exercise 4    Exercise Name 4  --  UBE L2  -CJ     Time 4  --  4 min  -CJ        Exercise 5    Exercise Name 5  --  supine B ER w/ YTB  -CJ     Reps 5  --  15  -CJ        Exercise 6    Exercise Name 6  --  Serratus punches on orange noodle  -CJ     Reps 6  --  15, #1  -CJ        Exercise 7    Exercise Name 7  --  Supine HA, YTB  -CJ     Reps 7  --  10  -CJ        Exercise 8    Exercise Name 8  --  Supine OH flexion  -     Cueing 8  --  Demo  -CJ     Reps 8  --  10 alt  -CJ     Time 8  --  on orange noodle  -CJ        Exercise 9    Exercise Name 9  --  doorway stretch-upside down V  -CJ     Reps 9  --  3  -CJ     Time 9  --  10 sec  -CJ       User Key  (r) = Recorded By, (t) = Taken By, (c) = Cosigned By    Initials Name Provider Type    Jt Lee, PT Physical Therapist                      Manual Rx (last 36 hours)      Manual Treatments     Row Name 01/16/20 1300             Total Minutes    54247 - PT Manual Therapy Minutes  15  -CJ         Manual Rx 1    Manual Rx 1 Location  prone thoracic and scapular STM  -      Manual Rx 1 Type  then supine for cervical STM, gentle PA/lateral glides  -      Manual Rx 1 Grade  gentle manual traction and passive stretching to B upper traps and levators  -        User Key  (r) = Recorded By, (t) = Taken By, (c) = Cosigned By    Initials Name Provider Type    Jt Lee, PT Physical Therapist                             Time Calculation:   Start Time:  1230  Stop Time: 1315  Time Calculation (min): 45 min  Total Timed Code Minutes- PT: 45 minute(s)  Therapy Charges for Today     Code Description Service Date Service Provider Modifiers Qty    48582555122  PT THER PROC EA 15 MIN 1/16/2020 Jt Mendoza, PT GP 2    [id number]  PT MANUAL THERAPY EA 15 MIN 1/16/2020 Jt Mendoza, PT GP 1                    Jt Mendoza PT  1/16/2020

## 2020-01-21 ENCOUNTER — HOSPITAL ENCOUNTER (OUTPATIENT)
Dept: PHYSICAL THERAPY | Facility: HOSPITAL | Age: 70
Setting detail: THERAPIES SERIES
Discharge: HOME | End: 2020-01-21

## 2020-01-21 DIAGNOSIS — M54.2 NECK PAIN: Primary | ICD-10-CM

## 2020-01-21 PROCEDURE — 97140 MANUAL THERAPY 1/> REGIONS: CPT

## 2020-01-21 PROCEDURE — 97110 THERAPEUTIC EXERCISES: CPT

## 2020-01-21 NOTE — THERAPY TREATMENT NOTE
Outpatient Physical Therapy Ortho Treatment Note  [facility]     Patient Name: Quyen Jalloh  : 1950  MRN: [id number]  Today's Date: 2020      Visit Date: 2020    Visit Dx:    ICD-10-CM ICD-9-CM   1. Neck pain M54.2 723.1       Patient Active Problem List   Diagnosis   • Adrenal cortical adenoma   • Decreased hearing   • Fatigue   • Hyperlipidemia   • Hypothyroidism   • Insomnia   • Seasonal allergic rhinitis   • Clicking shoulder   • Shoulder pain   • Vitamin D deficiency   • Osteopenia   • Fibromyalgia   • Neck pain   • Microscopic colitis   • Adenomatous polyp of colon        Past Medical History:   Diagnosis Date   • Bloating    • DDD (degenerative disc disease), cervical    • Depression    • Diverticulitis of colon 2016    Impression: 2015 - She had diverticulitis last year.  I think this is a mild flare compared to where she was last year.  Will go ahead and put her on Augmentin for 10 days.  Her WBC is normal now.  Impression: 04/10/2014 - Because of her blood in lucy stool, I am ordering the stat CT.  Will treat as outpt as long as there is not perf seen on CT.  I have put in Augmentin for TX so she doesn't have to use Flagyl.;    • Fibromyalgia     She had been folllowed by Dr. Heineke but he has retired.  She has been unable to find another FM specialist in the area.  She tried seeing Dr. Cordova, but this was a pain clinic and he pushed meds on her that she didn't want to take. She is not interested in narcotics for pain control.  I have agreed to continue her on her current meds and to fill out her disability forms when they come due.  H   • Gallstones    • Hyperlipidemia    • Leukocytosis    • Malignant melanoma of skin (CMS/HCC)    • Non-specific colitis 2016    Impression: 2015 - CT from [facility] reviewed.  There is some generalized edematous wall thickening in lucy entire rectosigmoid colon and some in descending colon.  She is tolerating the  Augmentin at this point.  I would like for her to see a GI doctor to try to determine what kind of colitis we are dealing with.  She is free to advance her diet as tolerated and I think this will actually help bulk up her stools.  Her WBC has been normal through all of this.;    • Osteopenia 04/13/2015    She will due for dexa next year.[id number] Appointment   • Polyp of sigmoid colon     Due for colonoscopy.  She will contact Dr. Harry Atwood's office to set this up.   • Tubular adenoma of colon 07/31/2014    c-scope Dr. Ornelas - repeat scope 5  years        Past Surgical History:   Procedure Laterality Date   • CHOLECYSTECTOMY     • COLONOSCOPY  01/12/2016    Erythematous mucosa in the entire examined colon, diverticulosis in the sigmoid colon, non-bleeding internal hemorrhoids   • MALIGNANT SKIN LESION EXCISION  2006    legs; melanoma   • TUBAL ABDOMINAL LIGATION                         PT Assessment/Plan     Row Name 01/21/20 1517          PT Assessment    Assessment Comments  Pt reports flare up of symptoms over past few days as well as difficulty with cervical rotation to back out of driveway. Held on adding noodle to supine exercise and several strengthening exercises per increased pain, however able to perform several exercises with YTB with minimal inc in symptoms.   -CN        PT Plan    PT Plan Comments  Continue to gently progress strengthening and postural exercises as tolerated. Consider SL arcs next visit.   -CN       User Key  (r) = Recorded By, (t) = Taken By, (c) = Cosigned By    Initials Name Provider Type    Denisha Hernandez, PT Physical Therapist          Modalities     Row Name 01/21/20 1300             Moist Heat    Location  cervical/thoracic spine in supine decompression (legs over over bolsterl).  -CN      Rx Minutes  12 mins  -CN      MH S/P Rx  Yes  -CN        User Key  (r) = Recorded By, (t) = Taken By, (c) = Cosigned By    Initials Name Provider Type    Denisha Hernandez  Michelle, PT Physical Therapist        OP Exercises     Row Name 01/21/20 1400 01/21/20 1300          Subjective Comments    Subjective Comments  It is flared up right now. I did some different things last time so I don't know if that was what did it or what.   -CN  --        Subjective Pain    Able to rate subjective pain?  yes  -CN  --     Pre-Treatment Pain Level  7  -CN  --        Total Minutes    99728 - PT Therapeutic Exercise Minutes  25  -CN  --     30811 - PT Manual Therapy Minutes  --  15  -CN        Exercise 1    Exercise Name 1  supine chin tuck with rotation  -CN  --     Reps 1  10  -CN  --     Time 1  3 sec  -CN  --        Exercise 2    Exercise Name 2  Shldr ext, YTB  -CN  --     Reps 2  15  -CN  --        Exercise 3    Exercise Name 3  rows, YTB  -CN  --     Reps 3  15  -CN  --        Exercise 4    Exercise Name 4  UBE L1  -CN  --     Time 4  4 min  -CN  --        Exercise 5    Exercise Name 5  supine B ER w/ YTB  -CN  --     Reps 5  15  -CN  --        Exercise 8    Exercise Name 8  Supine OH flexion  -CN  --     Cueing 8  Demo  -CN  --     Reps 8  10 alt  -CN  --       User Key  (r) = Recorded By, (t) = Taken By, (c) = Cosigned By    Initials Name Provider Type    Denisha Hernandez, KAY Physical Therapist                      Manual Rx (last 36 hours)      Manual Treatments     Row Name 01/21/20 1300             Total Minutes    19881 - PT Manual Therapy Minutes  15  -CN         Manual Rx 1    Manual Rx 1 Type  Gentle STM/MFR to suboccipitals, cervical paraspinals, UT, levator tissues, decompressive stroking, gentle cervical distraction; gentle passive UT/levator stretching  -CN         Manual Rx 2    Manual Rx 2 Location  R UT / levator   -CN      Manual Rx 2 Type  ischemic pressure release to palpable knots  -CN        User Key  (r) = Recorded By, (t) = Taken By, (c) = Cosigned By    Initials Name Provider Type    Denisha Hernandez, PT Physical Therapist          PT OP Goals     Row  Name 01/21/20 1500          PT Short Term Goals    STG 1  Patient will be independent with initial HEP for posture and ROM/flexibility without increased symptoms  -CN     STG 1 Progress  Partially Met  -CN     STG 2  Patient will be educated on and demonstrate knowledge of optimal posture, decompression techniques, stress management/relaxation strategies, ergonomics, good body mechanics, and proper lifting techniques to minimize stress to spine and soft tissues with daily function  -CN     STG 2 Progress  Met  -CN     STG 3  Patient will report >/= 25% increased ease/decreased pain with daily functional activities  -CN     STG 3 Progress  Ongoing  -CN     STG 3 Progress Comments  Pt reports flare up at this time inhibiting her ability to perform ADLs.   -CN        Long Term Goals    LTG 1  Patient will be independent with established HEP for strength/stabilization to aid in self management of symptoms/condition  -CN     LTG 1 Progress  Ongoing  -CN     LTG 2  Patient will have >/= 50% cervical AROM with minimal pain for safety and increased ease with driving.  -CN     LTG 2 Progress  Partially Met  -CN     LTG 3  Patient will have postural/shoulder girdle strength at least 4/5 for increased stability/tolerance with home/yard management   -CN     LTG 3 Progress  Ongoing  -CN     LTG 4  Patient will have pain </= 5/10 with performance of household/community/leisure/recreational activities.  -CN     LTG 4 Progress  Ongoing  -CN     LTG 5  Patient will report >/= 50% improvement in quality/quantity of sleep   -CN     LTG 5 Progress  Met  -CN       User Key  (r) = Recorded By, (t) = Taken By, (c) = Cosigned By    Initials Name Provider Type    Denisha Hernandez, PT Physical Therapist          Therapy Education  Given: HEP, Posture/body mechanics, Symptoms/condition management  Program: Reinforced  How Provided: Verbal, Demonstration  Provided to: Patient  Level of Understanding: Teach back education performed,  Verbalized              Time Calculation:   Start Time: 1430  Stop Time: 1520  Time Calculation (min): 50 min  Therapy Charges for Today     Code Description Service Date Service Provider Modifiers Qty    40729041611 HC PT MANUAL THERAPY EA 15 MIN 1/21/2020 Denisha Worrell, PT GP 1    84554135655 HC PT HOT OR COLD PACK TREAT MCARE 1/21/2020 Denisha Worrell, PT GP 1    [id number] HC PT THER PROC EA 15 MIN 1/21/2020 Denisha Worrell, PT GP 2                    Denisha Worrell, PT  1/21/2020

## 2020-01-23 ENCOUNTER — HOSPITAL ENCOUNTER (OUTPATIENT)
Dept: PHYSICAL THERAPY | Facility: HOSPITAL | Age: 70
Setting detail: THERAPIES SERIES
Discharge: HOME | End: 2020-01-23

## 2020-01-23 DIAGNOSIS — M54.2 NECK PAIN: Primary | ICD-10-CM

## 2020-01-23 PROCEDURE — 97110 THERAPEUTIC EXERCISES: CPT | Performed by: PHYSICAL THERAPIST

## 2020-01-23 NOTE — THERAPY TREATMENT NOTE
Outpatient Physical Therapy Ortho Treatment Note  [facility]     Patient Name: Quyen Jalloh  : 1950  MRN: [id number]  Today's Date: 2020      Visit Date: 2020    Visit Dx:    ICD-10-CM ICD-9-CM   1. Neck pain M54.2 723.1       Patient Active Problem List   Diagnosis   • Adrenal cortical adenoma   • Decreased hearing   • Fatigue   • Hyperlipidemia   • Hypothyroidism   • Insomnia   • Seasonal allergic rhinitis   • Clicking shoulder   • Shoulder pain   • Vitamin D deficiency   • Osteopenia   • Fibromyalgia   • Neck pain   • Microscopic colitis   • Adenomatous polyp of colon        Past Medical History:   Diagnosis Date   • Bloating    • DDD (degenerative disc disease), cervical    • Depression    • Diverticulitis of colon 2016    Impression: 2015 - She had diverticulitis last year.  I think this is a mild flare compared to where she was last year.  Will go ahead and put her on Augmentin for 10 days.  Her WBC is normal now.  Impression: 04/10/2014 - Because of her blood in lucy stool, I am ordering the stat CT.  Will treat as outpt as long as there is not perf seen on CT.  I have put in Augmentin for TX so she doesn't have to use Flagyl.;    • Fibromyalgia     She had been folllowed by Dr. Heineke but he has retired.  She has been unable to find another FM specialist in the area.  She tried seeing Dr. Cordova, but this was a pain clinic and he pushed meds on her that she didn't want to take. She is not interested in narcotics for pain control.  I have agreed to continue her on her current meds and to fill out her disability forms when they come due.  H   • Gallstones    • Hyperlipidemia    • Leukocytosis    • Malignant melanoma of skin (CMS/HCC)    • Non-specific colitis 2016    Impression: 2015 - CT from [facility] reviewed.  There is some generalized edematous wall thickening in lucy entire rectosigmoid colon and some in descending colon.  She is tolerating the  "Augmentin at this point.  I would like for her to see a GI doctor to try to determine what kind of colitis we are dealing with.  She is free to advance her diet as tolerated and I think this will actually help bulk up her stools.  Her WBC has been normal through all of this.;    • Osteopenia 04/13/2015    She will due for dexa next year.[id number] Appointment   • Polyp of sigmoid colon     Due for colonoscopy.  She will contact Dr. Harry Atwood's office to set this up.   • Tubular adenoma of colon 07/31/2014    c-scope Dr. Ornelas - repeat scope 5  years        Past Surgical History:   Procedure Laterality Date   • CHOLECYSTECTOMY     • COLONOSCOPY  01/12/2016    Erythematous mucosa in the entire examined colon, diverticulosis in the sigmoid colon, non-bleeding internal hemorrhoids   • MALIGNANT SKIN LESION EXCISION  2006    legs; melanoma   • TUBAL ABDOMINAL LIGATION                         PT Assessment/Plan     Row Name 01/23/20 1304          PT Assessment    Assessment Comments  Patient reporting what seems to be \"delayed onset muscle soreness\" approximately 36-48 hours after therapy session which she labels \"a flare\". Patient reporting inconsistent accounts of improvements since initiating therapy stating she is \"better overall\" but now having \"daily headaches\" which she did not have initially. She reports no current interest in ESIs as she does not want to \"mask the pain and hurt herself worse.\" Unsure if therapy is helping patient at this time.   -       User Key  (r) = Recorded By, (t) = Taken By, (c) = Cosigned By    Initials Name Provider Type    CJ Jt Mendoza S, PT Physical Therapist          Modalities     Row Name 01/23/20 1200             Moist Heat    MH Applied  Yes  -LEIDY      Location  across upper back and wrapped around cervical spine  -      Rx Minutes  15 mins  -LEIDY      MH S/P Rx  Yes  -        User Key  (r) = Recorded By, (t) = Taken By, (c) = Cosigned By    Initials Name Provider Type    " Jt Lee, PT Physical Therapist        OP Exercises     Row Name 01/23/20 1200             Subjective Comments    Subjective Comments  I am in a flare. It happens. I have no pain if I sit still but if I move it shoots up to a 6-7/10. I am getting headaches every time after therapy and I am sore about two days later.  -CJ         Subjective Pain    Able to rate subjective pain?  yes  -CJ      Pre-Treatment Pain Level  6  -CJ         Total Minutes    53075 - PT Therapeutic Exercise Minutes  30  -CJ         Exercise 1    Exercise Name 1  supine chin tuck with rotation  -CJ      Reps 1  10  -CJ      Time 1  3 sec  -CJ         Exercise 2    Exercise Name 2  Shldr ext, YTB  -CJ      Reps 2  15  -CJ         Exercise 3    Exercise Name 3  rows, YTB  -CJ      Reps 3  15  -CJ         Exercise 4    Exercise Name 4  UBE L1; 2 min fwd/back  -CJ      Time 4  4 min  -CJ         Exercise 5    Exercise Name 5  supine B ER w/ YTB  -CJ      Reps 5  15  -CJ         Exercise 6    Exercise Name 6  Serratus punches on orange noodle  -CJ      Reps 6  15, no weight-make fist  -CJ         Exercise 7    Exercise Name 7  Supine HA, YTB  -CJ      Reps 7  10  -CJ         Exercise 8    Exercise Name 8  Supine OH flexion  -CJ      Cueing 8  Demo  -CJ      Reps 8  10 alt  -CJ      Additional Comments  do not push past pain  -CJ         Exercise 9    Exercise Name 9  cervical extension isometric  -CJ      Reps 9  10  -CJ      Time 9  5 sec  -CJ      Additional Comments  supine, push into pillow  -CJ         Exercise 10    Exercise Name 10  s/l open books, B  -CJ      Reps 10  5  -CJ      Time 10  5 sec  -CJ         Exercise 11    Exercise Name 11  Lat pulls, YTB  -CJ      Reps 11  15  -CJ        User Key  (r) = Recorded By, (t) = Taken By, (c) = Cosigned By    Initials Name Provider Type    Jt Lee, PT Physical Therapist                                          Time Calculation:   Start Time: 1230  Stop Time: 1315  Time  Calculation (min): 45 min  Total Timed Code Minutes- PT: 30 minute(s)  Therapy Charges for Today     Code Description Service Date Service Provider Modifiers Qty    63340388279 HC PT THER PROC EA 15 MIN 1/23/2020 Jt Mendoza, PT GP 2    [id number] HC PT HOT OR COLD PACK TREAT MCARE 1/23/2020 Jt Mendoza, PT GP 1                    Jt Mendoza, PT  1/23/2020

## 2020-01-28 ENCOUNTER — HOSPITAL ENCOUNTER (OUTPATIENT)
Dept: PHYSICAL THERAPY | Facility: HOSPITAL | Age: 70
Setting detail: THERAPIES SERIES
Discharge: HOME | End: 2020-01-28

## 2020-01-28 DIAGNOSIS — M54.2 NECK PAIN: Primary | ICD-10-CM

## 2020-01-28 PROCEDURE — 97012 MECHANICAL TRACTION THERAPY: CPT

## 2020-01-28 PROCEDURE — 97110 THERAPEUTIC EXERCISES: CPT

## 2020-01-28 PROCEDURE — 97140 MANUAL THERAPY 1/> REGIONS: CPT

## 2020-01-28 NOTE — THERAPY TREATMENT NOTE
Outpatient Physical Therapy Ortho Treatment Note  [facility]     Patient Name: Quyen Jalloh  : 1950  MRN: [id number]  Today's Date: 2020      Visit Date: 2020    Visit Dx:    ICD-10-CM ICD-9-CM   1. Neck pain M54.2 723.1       Patient Active Problem List   Diagnosis   • Adrenal cortical adenoma   • Decreased hearing   • Fatigue   • Hyperlipidemia   • Hypothyroidism   • Insomnia   • Seasonal allergic rhinitis   • Clicking shoulder   • Shoulder pain   • Vitamin D deficiency   • Osteopenia   • Fibromyalgia   • Neck pain   • Microscopic colitis   • Adenomatous polyp of colon        Past Medical History:   Diagnosis Date   • Bloating    • DDD (degenerative disc disease), cervical    • Depression    • Diverticulitis of colon 2016    Impression: 2015 - She had diverticulitis last year.  I think this is a mild flare compared to where she was last year.  Will go ahead and put her on Augmentin for 10 days.  Her WBC is normal now.  Impression: 04/10/2014 - Because of her blood in lucy stool, I am ordering the stat CT.  Will treat as outpt as long as there is not perf seen on CT.  I have put in Augmentin for TX so she doesn't have to use Flagyl.;    • Fibromyalgia     She had been folllowed by Dr. Heineke but he has retired.  She has been unable to find another FM specialist in the area.  She tried seeing Dr. Cordova, but this was a pain clinic and he pushed meds on her that she didn't want to take. She is not interested in narcotics for pain control.  I have agreed to continue her on her current meds and to fill out her disability forms when they come due.  H   • Gallstones    • Hyperlipidemia    • Leukocytosis    • Malignant melanoma of skin (CMS/HCC)    • Non-specific colitis 2016    Impression: 2015 - CT from [facility] reviewed.  There is some generalized edematous wall thickening in lucy entire rectosigmoid colon and some in descending colon.  She is tolerating the  Augmentin at this point.  I would like for her to see a GI doctor to try to determine what kind of colitis we are dealing with.  She is free to advance her diet as tolerated and I think this will actually help bulk up her stools.  Her WBC has been normal through all of this.;    • Osteopenia 04/13/2015    She will due for dexa next year.[id number] Appointment   • Polyp of sigmoid colon     Due for colonoscopy.  She will contact Dr. Harry Atwood's office to set this up.   • Tubular adenoma of colon 07/31/2014    c-scope Dr. Ornelas - repeat scope 5  years        Past Surgical History:   Procedure Laterality Date   • CHOLECYSTECTOMY     • COLONOSCOPY  01/12/2016    Erythematous mucosa in the entire examined colon, diverticulosis in the sigmoid colon, non-bleeding internal hemorrhoids   • MALIGNANT SKIN LESION EXCISION  2006    legs; melanoma   • TUBAL ABDOMINAL LIGATION                         PT Assessment/Plan     Row Name 01/28/20 1205          PT Assessment    Assessment Comments  Pt reports flare up after progressed strengthening last visit, however slight dec in consistency of headache. Initiated trial of mechanical traction per improved pain with manual distraction. Pt c/o pain due to traction headpiece and may apply towels next visit if needed for comfort. Discussed possible referral back to MD if symptoms do not improve in next 1-2 weeks.   -CN        PT Plan    PT Plan Comments  Assess response to traction and continue if beneficial.  -CN       User Key  (r) = Recorded By, (t) = Taken By, (c) = Cosigned By    Initials Name Provider Type    CN Denisha Worrell, PT Physical Therapist          Modalities     Row Name 01/28/20 0900             Moist Heat     Applied  Yes  -CN      Location  thoracic and lumbar spine with traction  -CN      Rx Minutes  10 mins  -CN       S/P Rx  Yes  -CN         Traction 74575    Traction Type  Cervical  -CN      Rx Minutes  10  -CN      Duration  Intermittent  -CN       Position  -- 90/90  -CN      Weight  16  -CN      Hold  7  -CN      Relax  10  -CN        User Key  (r) = Recorded By, (t) = Taken By, (c) = Cosigned By    Initials Name Provider Type    Denisha Hernandez, PT Physical Therapist        OP Exercises     Row Name 01/28/20 1000 01/28/20 0900          Subjective Comments    Subjective Comments  It has been flared up since last time. It calmed down a notch over the weekend.   -CN  --        Subjective Pain    Able to rate subjective pain?  yes  -CN  --     Pre-Treatment Pain Level  7  -CN  --        Total Minutes    32791 - PT Therapeutic Exercise Minutes  20  -CN  --     36857 - PT Manual Therapy Minutes  --  10  -CN        Exercise 1    Exercise Name 1  supine chin tuck with rotation  -CN  --     Reps 1  10  -CN  --     Time 1  3 sec  -CN  --        Exercise 4    Exercise Name 4  UBE L1; 2 min fwd/back  -CN  --     Time 4  4 min  -CN  --        Exercise 5    Exercise Name 5  supine B ER w/ YTB  -CN  --     Reps 5  15  -CN  --        Exercise 8    Exercise Name 8  Supine OH flexion  -CN  --     Cueing 8  Demo  -CN  --     Reps 8  10 alt  -CN  --       User Key  (r) = Recorded By, (t) = Taken By, (c) = Cosigned By    Initials Name Provider Type    Denisha eHrnandez, PT Physical Therapist                      Manual Rx (last 36 hours)      Manual Treatments     Row Name 01/28/20 0900             Total Minutes    25562 - PT Manual Therapy Minutes  10  -CN         Manual Rx 1    Manual Rx 1 Type  stm to UT, levator, suboccipitals; manual UT stretching, gentle PA mobs  -CN        User Key  (r) = Recorded By, (t) = Taken By, (c) = Cosigned By    Initials Name Provider Type    Denisha Hernandez, PT Physical Therapist          PT OP Goals     Row Name 01/28/20 1100          PT Short Term Goals    STG 1  Patient will be independent with initial HEP for posture and ROM/flexibility without increased symptoms  -CN     STG 1 Progress  Partially Met  -CN      STG 2  Patient will be educated on and demonstrate knowledge of optimal posture, decompression techniques, stress management/relaxation strategies, ergonomics, good body mechanics, and proper lifting techniques to minimize stress to spine and soft tissues with daily function  -CN     STG 2 Progress  Met  -CN     STG 3  Patient will report >/= 25% increased ease/decreased pain with daily functional activities  -CN     STG 3 Progress  Ongoing  -CN     STG 3 Progress Comments  Pt reports slight reduction in HA over the weekend.  -CN        Long Term Goals    LTG 1  Patient will be independent with established HEP for strength/stabilization to aid in self management of symptoms/condition  -CN     LTG 1 Progress  Ongoing  -CN     LTG 2  Patient will have >/= 50% cervical AROM with minimal pain for safety and increased ease with driving.  -CN     LTG 2 Progress  Partially Met  -CN     LTG 3  Patient will have postural/shoulder girdle strength at least 4/5 for increased stability/tolerance with home/yard management   -CN     LTG 3 Progress  Ongoing  -CN     LTG 4  Patient will have pain </= 5/10 with performance of household/community/leisure/recreational activities.  -CN     LTG 4 Progress  Ongoing  -CN     LTG 5  Patient will report >/= 50% improvement in quality/quantity of sleep   -CN     LTG 5 Progress  Met  -CN       User Key  (r) = Recorded By, (t) = Taken By, (c) = Cosigned By    Initials Name Provider Type    Denisha Hernandez, PT Physical Therapist          Therapy Education  Given: HEP, Posture/body mechanics, Symptoms/condition management  Program: Reinforced  How Provided: Verbal, Demonstration  Provided to: Patient  Level of Understanding: Teach back education performed, Verbalized              Time Calculation:   Start Time: 1046  Stop Time: 1136  Time Calculation (min): 50 min  Therapy Charges for Today     Code Description Service Date Service Provider Modifiers Qty    14281389531  PT HOT OR  COLD PACK TREAT MCARE 1/28/2020 Denisha Worrell, PT GP 1    64803721518 HC PT MANUAL THERAPY EA 15 MIN 1/28/2020 Denisha Worrell, PT 60, GP 1    44923889195 HC PT THER PROC EA 15 MIN 1/28/2020 Denisha Worrell, PT GP 1    11101702258 HC PT-TRACTION MECHANICAL 1/28/2020 Denisha Worrell, PT 59 1                    Denisha Worrell, PT  1/28/2020

## 2020-01-30 ENCOUNTER — HOSPITAL ENCOUNTER (OUTPATIENT)
Dept: PHYSICAL THERAPY | Facility: HOSPITAL | Age: 70
Setting detail: THERAPIES SERIES
Discharge: HOME | End: 2020-01-30

## 2020-01-30 DIAGNOSIS — M54.2 NECK PAIN: Primary | ICD-10-CM

## 2020-01-30 PROCEDURE — 97012 MECHANICAL TRACTION THERAPY: CPT

## 2020-01-30 PROCEDURE — 97140 MANUAL THERAPY 1/> REGIONS: CPT

## 2020-01-30 PROCEDURE — 97110 THERAPEUTIC EXERCISES: CPT

## 2020-01-30 NOTE — THERAPY TREATMENT NOTE
Outpatient Physical Therapy Ortho Treatment Note  [facility]     Patient Name: Quyen Jalloh  : 1950  MRN: [id number]  Today's Date: 2020      Visit Date: 2020    Visit Dx:    ICD-10-CM ICD-9-CM   1. Neck pain M54.2 723.1       Patient Active Problem List   Diagnosis   • Adrenal cortical adenoma   • Decreased hearing   • Fatigue   • Hyperlipidemia   • Hypothyroidism   • Insomnia   • Seasonal allergic rhinitis   • Clicking shoulder   • Shoulder pain   • Vitamin D deficiency   • Osteopenia   • Fibromyalgia   • Neck pain   • Microscopic colitis   • Adenomatous polyp of colon        Past Medical History:   Diagnosis Date   • Bloating    • DDD (degenerative disc disease), cervical    • Depression    • Diverticulitis of colon 2016    Impression: 2015 - She had diverticulitis last year.  I think this is a mild flare compared to where she was last year.  Will go ahead and put her on Augmentin for 10 days.  Her WBC is normal now.  Impression: 04/10/2014 - Because of her blood in lucy stool, I am ordering the stat CT.  Will treat as outpt as long as there is not perf seen on CT.  I have put in Augmentin for TX so she doesn't have to use Flagyl.;    • Fibromyalgia     She had been folllowed by Dr. Heineke but he has retired.  She has been unable to find another FM specialist in the area.  She tried seeing Dr. Cordova, but this was a pain clinic and he pushed meds on her that she didn't want to take. She is not interested in narcotics for pain control.  I have agreed to continue her on her current meds and to fill out her disability forms when they come due.  H   • Gallstones    • Hyperlipidemia    • Leukocytosis    • Malignant melanoma of skin (CMS/HCC)    • Non-specific colitis 2016    Impression: 2015 - CT from [facility] reviewed.  There is some generalized edematous wall thickening in lucy entire rectosigmoid colon and some in descending colon.  She is tolerating the  Augmentin at this point.  I would like for her to see a GI doctor to try to determine what kind of colitis we are dealing with.  She is free to advance her diet as tolerated and I think this will actually help bulk up her stools.  Her WBC has been normal through all of this.;    • Osteopenia 04/13/2015    She will due for dexa next year.[id number] Appointment   • Polyp of sigmoid colon     Due for colonoscopy.  She will contact Dr. Harry Atwood's office to set this up.   • Tubular adenoma of colon 07/31/2014    c-scope Dr. Ornelas - repeat scope 5  years        Past Surgical History:   Procedure Laterality Date   • CHOLECYSTECTOMY     • COLONOSCOPY  01/12/2016    Erythematous mucosa in the entire examined colon, diverticulosis in the sigmoid colon, non-bleeding internal hemorrhoids   • MALIGNANT SKIN LESION EXCISION  2006    legs; melanoma   • TUBAL ABDOMINAL LIGATION                         PT Assessment/Plan     Row Name 01/30/20 1232          PT Assessment    Assessment Comments  Pt reports reduced symptoms after last visit, unsure if attributed to manual vs traction. Pt reports pain rated 5/10 today with premorbid pain rated 3/10. Pt progressing well with current program and will likely tolerate progressed strengthening in upcoming visits.   -CN        PT Plan    PT Plan Comments  Continue with progressive strengthening as tolerated. Consider additon of rows, ext and lat pulls next visit.   -CN       User Key  (r) = Recorded By, (t) = Taken By, (c) = Cosigned By    Initials Name Provider Type    CN Denisha Worrell, PT Physical Therapist          Modalities     Row Name 01/30/20 0900             Moist Heat    MH Applied  Yes  -CN      Location  thoracic and lumbar spine with traction  -CN      Rx Minutes  10 mins  -CN       S/P Rx  Yes  -CN         Traction 67908    Traction Type  Cervical  -CN      Rx Minutes  10  -CN      Duration  Intermittent  -CN      Position  Other 90/90  -CN      Weight  18 /7   -CN      Hold  40  -CN      Relax  10  -CN        User Key  (r) = Recorded By, (t) = Taken By, (c) = Cosigned By    Initials Name Provider Type    Denisha Hernandez PT Physical Therapist        OP Exercises     Row Name 01/30/20 1100             Subjective Comments    Subjective Comments  It feels better actually. I am going to get a massage after this so I am looking foward to that. My pain is less though.   -CN         Subjective Pain    Able to rate subjective pain?  yes  -CN      Pre-Treatment Pain Level  5  -CN         Total Minutes    19963 - PT Therapeutic Exercise Minutes  20  -CN      83119 - PT Manual Therapy Minutes  10  -CN         Exercise 1    Exercise Name 1  supine chin tuck with rotation  -CN      Reps 1  10  -CN      Time 1  3 sec  -CN         Exercise 4    Exercise Name 4  UBE L1; 2 min fwd/back  -CN      Time 4  4 min  -CN         Exercise 5    Exercise Name 5  supine B ER w/ RTB  -CN      Reps 5  15  -CN         Exercise 8    Exercise Name 8  Supine OH flexion  -CN      Cueing 8  Demo  -CN      Reps 8  10 alt  -CN        User Key  (r) = Recorded By, (t) = Taken By, (c) = Cosigned By    Initials Name Provider Type    Denisha Hernandez PT Physical Therapist                      Manual Rx (last 36 hours)      Manual Treatments     Row Name 01/30/20 1100             Total Minutes    47851 - PT Manual Therapy Minutes  10  -CN         Manual Rx 1    Manual Rx 1 Type  stm to UT, levator, suboccipitals; manual UT stretching, gentle PA mobs  -CN        User Key  (r) = Recorded By, (t) = Taken By, (c) = Cosigned By    Initials Name Provider Type    Denisha Hernandez PT Physical Therapist              Therapy Education  Given: HEP, Posture/body mechanics, Symptoms/condition management  Program: Reinforced  How Provided: Verbal, Demonstration  Provided to: Patient  Level of Understanding: Teach back education performed, Verbalized              Time Calculation:   Start Time:  1045  Stop Time: 1130  Time Calculation (min): 45 min  Therapy Charges for Today     Code Description Service Date Service Provider Modifiers Qty    18713358474 HC PT THER PROC EA 15 MIN 1/30/2020 Denisha Worrell, PT GP 1    64878263662 HC PT MANUAL THERAPY EA 15 MIN 1/30/2020 Denisha Worrell, PT 60, GP 1    27861262326 HC PT HOT OR COLD PACK TREAT MCARE 1/30/2020 Denisha Worrell, PT GP 1    [id number]  PT-TRACTION MECHANICAL 1/30/2020 Denisha Worrell, PT 59 1                    Denisha Worrell, PT  1/30/2020

## 2020-02-04 ENCOUNTER — HOSPITAL ENCOUNTER (OUTPATIENT)
Dept: PHYSICAL THERAPY | Facility: HOSPITAL | Age: 70
Setting detail: THERAPIES SERIES
Discharge: HOME | End: 2020-02-04

## 2020-02-04 DIAGNOSIS — M54.2 NECK PAIN: Primary | ICD-10-CM

## 2020-02-04 PROCEDURE — 97110 THERAPEUTIC EXERCISES: CPT

## 2020-02-04 PROCEDURE — 97140 MANUAL THERAPY 1/> REGIONS: CPT

## 2020-02-04 NOTE — THERAPY TREATMENT NOTE
Outpatient Physical Therapy Ortho Treatment Note  [facility]     Patient Name: Quyen Jalloh  : 1950  MRN: [id number]  Today's Date: 2020      Visit Date: 2020    Visit Dx:    ICD-10-CM ICD-9-CM   1. Neck pain M54.2 723.1       Patient Active Problem List   Diagnosis   • Adrenal cortical adenoma   • Decreased hearing   • Fatigue   • Hyperlipidemia   • Hypothyroidism   • Insomnia   • Seasonal allergic rhinitis   • Clicking shoulder   • Shoulder pain   • Vitamin D deficiency   • Osteopenia   • Fibromyalgia   • Neck pain   • Microscopic colitis   • Adenomatous polyp of colon        Past Medical History:   Diagnosis Date   • Bloating    • DDD (degenerative disc disease), cervical    • Depression    • Diverticulitis of colon 2016    Impression: 2015 - She had diverticulitis last year.  I think this is a mild flare compared to where she was last year.  Will go ahead and put her on Augmentin for 10 days.  Her WBC is normal now.  Impression: 04/10/2014 - Because of her blood in lucy stool, I am ordering the stat CT.  Will treat as outpt as long as there is not perf seen on CT.  I have put in Augmentin for TX so she doesn't have to use Flagyl.;    • Fibromyalgia     She had been folllowed by Dr. Heineke but he has retired.  She has been unable to find another FM specialist in the area.  She tried seeing Dr. Cordova, but this was a pain clinic and he pushed meds on her that she didn't want to take. She is not interested in narcotics for pain control.  I have agreed to continue her on her current meds and to fill out her disability forms when they come due.  H   • Gallstones    • Hyperlipidemia    • Leukocytosis    • Malignant melanoma of skin (CMS/HCC)    • Non-specific colitis 2016    Impression: 2015 - CT from [facility] reviewed.  There is some generalized edematous wall thickening in lucy entire rectosigmoid colon and some in descending colon.  She is tolerating the Augmentin  at this point.  I would like for her to see a GI doctor to try to determine what kind of colitis we are dealing with.  She is free to advance her diet as tolerated and I think this will actually help bulk up her stools.  Her WBC has been normal through all of this.;    • Osteopenia 04/13/2015    She will due for dexa next year.[id number] Appointment   • Polyp of sigmoid colon     Due for colonoscopy.  She will contact Dr. Harry Atwood's office to set this up.   • Tubular adenoma of colon 07/31/2014    c-scope Dr. Ornelas - repeat scope 5  years        Past Surgical History:   Procedure Laterality Date   • CHOLECYSTECTOMY     • COLONOSCOPY  01/12/2016    Erythematous mucosa in the entire examined colon, diverticulosis in the sigmoid colon, non-bleeding internal hemorrhoids   • MALIGNANT SKIN LESION EXCISION  2006    legs; melanoma   • TUBAL ABDOMINAL LIGATION                         PT Assessment/Plan     Row Name 02/04/20 1355          PT Assessment    Assessment Comments  Pt reports no change in symptoms after traction last visit. Continued with manual therapy and progressed postural strengthening today. Pt with limited tolerance of progression and reports pain with extended repetitions of exercises.   -CN        PT Plan    PT Plan Comments  Continue to progress strengthening as tolerated and consider D/C in next 1-2 weeks if little to no progress observed.   -CN       User Key  (r) = Recorded By, (t) = Taken By, (c) = Cosigned By    Initials Name Provider Type    Denisha Hernandez, PT Physical Therapist          Modalities     Row Name 02/04/20 1100             Moist Heat    MH Applied  Yes  -CN      Location  thoracic and lumbar spine  -CN      Rx Minutes  10 mins  -CN      MH S/P Rx  Yes  -CN        User Key  (r) = Recorded By, (t) = Taken By, (c) = Cosigned By    Initials Name Provider Type    Denisha Hernandez, PT Physical Therapist        OP Exercises     Row Name 02/04/20 1200 02/04/20 1100           Subjective Comments    Subjective Comments  It has really been bothering me. Dr. Dunaway is sending me to an ortho to be evaluated.   -CN  --        Subjective Pain    Able to rate subjective pain?  yes  -CN  --     Pre-Treatment Pain Level  6  -CN  --        Total Minutes    35163 - PT Therapeutic Exercise Minutes  30  -CN  --     71394 - PT Manual Therapy Minutes  --  10  -CN        Exercise 1    Exercise Name 1  supine chin tuck with rotation  -CN  --     Reps 1  10  -CN  --     Time 1  3 sec  -CN  --        Exercise 2    Exercise Name 2  Supine chin tuck  -CN  --     Cueing 2  Demo  -CN  --     Reps 2  10  -CN  --     Time 2  3 sec  -CN  --        Exercise 3    Exercise Name 3  rows, YTB  -CN  --     Reps 3  15  -CN  --        Exercise 4    Exercise Name 4  UBE L1  -CN  --     Time 4  4 min  -CN  --        Exercise 5    Exercise Name 5  supine B ER w/ RTB  -CN  --     Reps 5  15  -CN  --        Exercise 6    Exercise Name 6  shoudler extension  -CN  --     Cueing 6  Demo  -CN  --     Reps 6  15  -CN  --     Additional Comments  YTB  -CN  --        Exercise 7    Exercise Name 7  Supine HA, YTB  -CN  --     Reps 7  10  -CN  --        Exercise 8    Exercise Name 8  Supine OH flexion  -CN  --     Cueing 8  Demo  -CN  --     Reps 8  10 alt  -CN  --        Exercise 9    Exercise Name 9  Wall angles with noodle  -CN  --     Cueing 9  Demo  -CN  --     Reps 9  10  -CN  --        Exercise 10    Exercise Name 10  s/l open books, B  -CN  --     Reps 10  5  -CN  --     Time 10  5 sec  -CN  --        Exercise 11    Exercise Name 11  Chin tuck with rotation into ball  -CN  --     Cueing 11  Demo  -CN  --     Reps 11  10 B  -CN  --       User Key  (r) = Recorded By, (t) = Taken By, (c) = Cosigned By    Initials Name Provider Type    Denisha Hernandez, PT Physical Therapist                      Manual Rx (last 36 hours)      Manual Treatments     Row Name 02/04/20 1100             Total Minutes    79517 - PT  Manual Therapy Minutes  10  -CN         Manual Rx 1    Manual Rx 1 Type  stm to UT, levator, suboccipitals; manual UT stretching, gentle PA mobs  -CN        User Key  (r) = Recorded By, (t) = Taken By, (c) = Cosigned By    Initials Name Provider Type    Denisha Hernandez, PT Physical Therapist          PT OP Goals     Row Name 02/04/20 1200          PT Short Term Goals    STG 1  Patient will be independent with initial HEP for posture and ROM/flexibility without increased symptoms  -CN     STG 1 Progress  Partially Met  -CN     STG 2  Patient will be educated on and demonstrate knowledge of optimal posture, decompression techniques, stress management/relaxation strategies, ergonomics, good body mechanics, and proper lifting techniques to minimize stress to spine and soft tissues with daily function  -CN     STG 2 Progress  Met  -CN     STG 3  Patient will report >/= 25% increased ease/decreased pain with daily functional activities  -CN     STG 3 Progress  Met  -CN     STG 3 Progress Comments  Pt reports 40-50% decrease in symptoms since IE.   -CN        Long Term Goals    LTG 1  Patient will be independent with established HEP for strength/stabilization to aid in self management of symptoms/condition  -CN     LTG 1 Progress  Ongoing  -CN     LTG 2  Patient will have >/= 50% cervical AROM with minimal pain for safety and increased ease with driving.  -CN     LTG 2 Progress  Partially Met  -CN     LTG 3  Patient will have postural/shoulder girdle strength at least 4/5 for increased stability/tolerance with home/yard management   -CN     LTG 3 Progress  Ongoing  -CN     LTG 4  Patient will have pain </= 5/10 with performance of household/community/leisure/recreational activities.  -CN     LTG 4 Progress  Ongoing  -CN     LTG 5  Patient will report >/= 50% improvement in quality/quantity of sleep   -CN     LTG 5 Progress  Met  -CN       User Key  (r) = Recorded By, (t) = Taken By, (c) = Cosigned By    Initials  Name Provider Type    Denisha Hernandez, PT Physical Therapist          Therapy Education  Given: HEP, Posture/body mechanics, Symptoms/condition management  Program: Progressed  How Provided: Verbal, Demonstration  Provided to: Patient  Level of Understanding: Teach back education performed, Verbalized              Time Calculation:   Start Time: 1203  Stop Time: 1253  Time Calculation (min): 50 min  Therapy Charges for Today     Code Description Service Date Service Provider Modifiers Qty    59539180867 HC PT THER PROC EA 15 MIN 2/4/2020 Denisha Worrell, PT GP 2    22169299740 HC PT MANUAL THERAPY EA 15 MIN 2/4/2020 Denisha Worrell, PT GP 1    [id number]  PT HOT OR COLD PACK TREAT MCARE 2/4/2020 Denisha Worrell, PT GP 1                    Denisha Worrell, PT  2/4/2020

## 2020-02-06 ENCOUNTER — APPOINTMENT (OUTPATIENT)
Dept: PHYSICAL THERAPY | Facility: HOSPITAL | Age: 70
End: 2020-02-06

## 2020-02-11 ENCOUNTER — HOSPITAL ENCOUNTER (OUTPATIENT)
Dept: PHYSICAL THERAPY | Facility: HOSPITAL | Age: 70
Setting detail: THERAPIES SERIES
Discharge: HOME | End: 2020-02-11

## 2020-02-11 DIAGNOSIS — M54.2 NECK PAIN: Primary | ICD-10-CM

## 2020-02-11 PROCEDURE — 97110 THERAPEUTIC EXERCISES: CPT

## 2020-02-11 PROCEDURE — 97140 MANUAL THERAPY 1/> REGIONS: CPT

## 2020-02-11 NOTE — THERAPY TREATMENT NOTE
Outpatient Physical Therapy Ortho Treatment Note  [facility]     Patient Name: Quyen Jalloh  : 1950  MRN: [id number]  Today's Date: 2020      Visit Date: 2020    Visit Dx:    ICD-10-CM ICD-9-CM   1. Neck pain M54.2 723.1       Patient Active Problem List   Diagnosis   • Adrenal cortical adenoma   • Decreased hearing   • Fatigue   • Hyperlipidemia   • Hypothyroidism   • Insomnia   • Seasonal allergic rhinitis   • Clicking shoulder   • Shoulder pain   • Vitamin D deficiency   • Osteopenia   • Fibromyalgia   • Neck pain   • Microscopic colitis   • Adenomatous polyp of colon        Past Medical History:   Diagnosis Date   • Bloating    • DDD (degenerative disc disease), cervical    • Depression    • Diverticulitis of colon 2016    Impression: 2015 - She had diverticulitis last year.  I think this is a mild flare compared to where she was last year.  Will go ahead and put her on Augmentin for 10 days.  Her WBC is normal now.  Impression: 04/10/2014 - Because of her blood in lucy stool, I am ordering the stat CT.  Will treat as outpt as long as there is not perf seen on CT.  I have put in Augmentin for TX so she doesn't have to use Flagyl.;    • Fibromyalgia     She had been folllowed by Dr. Heineke but he has retired.  She has been unable to find another FM specialist in the area.  She tried seeing Dr. Cordova, but this was a pain clinic and he pushed meds on her that she didn't want to take. She is not interested in narcotics for pain control.  I have agreed to continue her on her current meds and to fill out her disability forms when they come due.  H   • Gallstones    • Hyperlipidemia    • Leukocytosis    • Malignant melanoma of skin (CMS/HCC)    • Non-specific colitis 2016    Impression: 2015 - CT from [facility] reviewed.  There is some generalized edematous wall thickening in lucy entire rectosigmoid colon and some in descending colon.  She is tolerating the  Augmentin at this point.  I would like for her to see a GI doctor to try to determine what kind of colitis we are dealing with.  She is free to advance her diet as tolerated and I think this will actually help bulk up her stools.  Her WBC has been normal through all of this.;    • Osteopenia 04/13/2015    She will due for dexa next year.[id number] Appointment   • Polyp of sigmoid colon     Due for colonoscopy.  She will contact Dr. Harry Atwood's office to set this up.   • Tubular adenoma of colon 07/31/2014    c-scope Dr. Ornelas - repeat scope 5  years        Past Surgical History:   Procedure Laterality Date   • CHOLECYSTECTOMY     • COLONOSCOPY  01/12/2016    Erythematous mucosa in the entire examined colon, diverticulosis in the sigmoid colon, non-bleeding internal hemorrhoids   • MALIGNANT SKIN LESION EXCISION  2006    legs; melanoma   • TUBAL ABDOMINAL LIGATION                         PT Assessment/Plan     Row Name 02/11/20 9666          PT Assessment    Assessment Comments  Mrs. Jalloh continues with neck and upper back pain and tightness of mm around neck, scapula and shoulder.  Posture habits appear good and she verbalizes good knowledge/precautions of her condition.  Pt does a good chin tuck and was able to use t-band with minimal to no increased discomfort.   -LP     Please refer to paper survey for additional self-reported information  Yes  -LP     Rehab Potential  Good  -LP     Patient/caregiver participated in establishment of treatment plan and goals  Yes  -LP     Patient would benefit from skilled therapy intervention  Yes  -LP        PT Plan    PT Frequency  2x/week  -LP     Predicted Duration of Therapy Intervention (Therapy Eval)  1-2 more weeks, then being referred to ortho. for further evaluation  -LP     PT Plan Comments  POCCont per current POC  -LP       User Key  (r) = Recorded By, (t) = Taken By, (c) = Cosigned By    Initials Name Provider Type    LP Jo Ann Osorio, PT Physical  Therapist          Modalities     Row Name 02/11/20 1300             Moist Heat    MH Applied  Yes  -LP      Location  neck and upper abck  -LP      Rx Minutes  15 mins  -LP      MH S/P Rx  Yes did some supine exercises while on heat.  -LP        User Key  (r) = Recorded By, (t) = Taken By, (c) = Cosigned By    Initials Name Provider Type    LP Jo Ann Osorio, PT Physical Therapist        OP Exercises     Row Name 02/11/20 1300             Subjective Comments    Subjective Comments  No changes  -LP         Subjective Pain    Able to rate subjective pain?  yes  -LP      Pre-Treatment Pain Level  6  -LP         Total Minutes    86213 - PT Therapeutic Exercise Minutes  20  -LP      64831 - PT Manual Therapy Minutes  12  -LP         Exercise 1    Exercise Name 1  supine chin tuck with rotation  -LP      Cueing 1  Verbal;Demo  -LP      Reps 1  10  -LP      Time 1  3s  -LP         Exercise 2    Exercise Name 2  Supine chin tuck  -LP      Cueing 2  Verbal  -LP      Reps 2  10  -LP      Time 2  3s  -LP         Exercise 3    Exercise Name 3  rows, YTB  -LP      Cueing 3  Verbal;Demo  -LP      Reps 3  15  -LP         Exercise 4    Exercise Name 4  UBE L1  -LP      Time 4  4 min  -LP         Exercise 5    Exercise Name 5  supine B ER w/ RTB  -LP      Cueing 5  Demo  -LP      Sets 5  2  -LP      Reps 5  10  -LP      Additional Comments  tried singles, but pt thought more painful  -LP         Exercise 6    Exercise Name 6  shoudler extension  -LP      Cueing 6  Demo  -LP      Reps 6  15  -LP      Additional Comments  YTB  -LP         Exercise 9    Exercise Name 9  Wall angles with noodle  -LP      Cueing 9  Verbal  -LP      Reps 9  10  -LP      Time 9  5s  -LP         Exercise 10    Exercise Name 10  s/l open books, B  -LP      Reps 10  7  -LP      Time 10  5s  -LP        User Key  (r) = Recorded By, (t) = Taken By, (c) = Cosigned By    Initials Name Provider Type    LP Jo Ann Osorio, PT Physical Therapist                       Manual Rx (last 36 hours)      Manual Treatments     Row Name 02/11/20 1300             Total Minutes    28609 - PT Manual Therapy Minutes  12  -LP         Manual Rx 1    Manual Rx 1 Location  supine- cerv  -LP      Manual Rx 1 Type  STM to UT,levator, scapular mm. R>L  Gentle PA mobs, manual distraction, sub-occipital release  -LP      Manual Rx 1 Grade  gentle  -LP      Manual Rx 1 Duration  12  -LP        User Key  (r) = Recorded By, (t) = Taken By, (c) = Cosigned By    Initials Name Provider Type    LP Jo Ann Osorio PT Physical Therapist              Therapy Education  Given: HEP, Posture/body mechanics, Symptoms/condition management  Program: Reinforced  How Provided: Verbal, Demonstration  Provided to: Patient  Level of Understanding: Teach back education performed              Time Calculation:   Start Time: 1315  Stop Time: 1400  Time Calculation (min): 45 min  Therapy Charges for Today     Code Description Service Date Service Provider Modifiers Qty    53304005748  PT MANUAL THERAPY EA 15 MIN 2/11/2020 Jo Ann Osorio, PT GP 1    62802179910 HC PT THER PROC EA 15 MIN 2/11/2020 Jo Ann Osoroi, PT GP 1    [id number]  PT HOT OR COLD PACK TREAT MCARE 2/11/2020 Jo Ann Osorio, PT GP 1                    Jo Ann Osorio PT  2/11/2020

## 2020-02-13 ENCOUNTER — HOSPITAL ENCOUNTER (OUTPATIENT)
Dept: PHYSICAL THERAPY | Facility: HOSPITAL | Age: 70
Setting detail: THERAPIES SERIES
Discharge: HOME | End: 2020-02-13

## 2020-02-13 DIAGNOSIS — M54.2 NECK PAIN: Primary | ICD-10-CM

## 2020-02-13 PROCEDURE — 97110 THERAPEUTIC EXERCISES: CPT

## 2020-02-13 PROCEDURE — 97140 MANUAL THERAPY 1/> REGIONS: CPT

## 2020-02-13 NOTE — THERAPY DISCHARGE NOTE
Outpatient Physical Therapy Ortho Treatment Note/Discharge Summary  [facility]     Patient Name: Quyen Jalloh  : 1950  MRN: [id number]  Today's Date: 2020      Visit Date: 2020    Visit Dx:    ICD-10-CM ICD-9-CM   1. Neck pain M54.2 723.1       Patient Active Problem List   Diagnosis   • Adrenal cortical adenoma   • Decreased hearing   • Fatigue   • Hyperlipidemia   • Hypothyroidism   • Insomnia   • Seasonal allergic rhinitis   • Clicking shoulder   • Shoulder pain   • Vitamin D deficiency   • Osteopenia   • Fibromyalgia   • Neck pain   • Microscopic colitis   • Adenomatous polyp of colon        Past Medical History:   Diagnosis Date   • Bloating    • DDD (degenerative disc disease), cervical    • Depression    • Diverticulitis of colon 2016    Impression: 2015 - She had diverticulitis last year.  I think this is a mild flare compared to where she was last year.  Will go ahead and put her on Augmentin for 10 days.  Her WBC is normal now.  Impression: 04/10/2014 - Because of her blood in lucy stool, I am ordering the stat CT.  Will treat as outpt as long as there is not perf seen on CT.  I have put in Augmentin for TX so she doesn't have to use Flagyl.;    • Fibromyalgia     She had been folllowed by Dr. Heineke but he has retired.  She has been unable to find another FM specialist in the area.  She tried seeing Dr. Cordova, but this was a pain clinic and he pushed meds on her that she didn't want to take. She is not interested in narcotics for pain control.  I have agreed to continue her on her current meds and to fill out her disability forms when they come due.  H   • Gallstones    • Hyperlipidemia    • Leukocytosis    • Malignant melanoma of skin (CMS/HCC)    • Non-specific colitis 2016    Impression: 2015 - CT from [facility] reviewed.  There is some generalized edematous wall thickening in lucy entire rectosigmoid colon and some in descending colon.  She is  tolerating the Augmentin at this point.  I would like for her to see a GI doctor to try to determine what kind of colitis we are dealing with.  She is free to advance her diet as tolerated and I think this will actually help bulk up her stools.  Her WBC has been normal through all of this.;    • Osteopenia 04/13/2015    She will due for dexa next year.[id number] Appointment   • Polyp of sigmoid colon     Due for colonoscopy.  She will contact Dr. Harry Atwood's office to set this up.   • Tubular adenoma of colon 07/31/2014    c-scope Dr. Ornelas - repeat scope 5  years        Past Surgical History:   Procedure Laterality Date   • CHOLECYSTECTOMY     • COLONOSCOPY  01/12/2016    Erythematous mucosa in the entire examined colon, diverticulosis in the sigmoid colon, non-bleeding internal hemorrhoids   • MALIGNANT SKIN LESION EXCISION  2006    legs; melanoma   • TUBAL ABDOMINAL LIGATION                             Modalities     Row Name 02/13/20 1100             Moist Heat    MH Applied  Yes  -CN      Location  neck and upper abck  -CN      Rx Minutes  15 mins  -CN      MH S/P Rx  Yes  -CN        User Key  (r) = Recorded By, (t) = Taken By, (c) = Cosigned By    Initials Name Provider Type    Denisha Hernandez, PT Physical Therapist          OP Exercises     Row Name 02/13/20 1200 02/13/20 1100          Subjective Comments    Subjective Comments  It feels a little better today, but I took Advil last night. I go to the ortho on 2/25.   -CN  --        Subjective Pain    Able to rate subjective pain?  yes  -CN  --     Pre-Treatment Pain Level  4  -CN  --        Total Minutes    14796 - PT Therapeutic Exercise Minutes  28  -CN  --     78130 - PT Manual Therapy Minutes  --  12  -CN        Exercise 1    Exercise Name 1  supine chin tuck with rotation  -CN  --     Cueing 1  Verbal;Demo  -CN  --     Reps 1  10  -CN  --     Time 1  3s  -CN  --        Exercise 2    Exercise Name 2  Supine chin tuck  -CN  --     Cueing 2   Verbal  -CN  --     Reps 2  10  -CN  --     Time 2  3s  -CN  --        Exercise 3    Exercise Name 3  rows, YTB  -CN  --     Cueing 3  Verbal;Demo  -CN  --     Reps 3  15  -CN  --        Exercise 4    Exercise Name 4  UBE L1  -CN  --     Time 4  4 min  -CN  --        Exercise 5    Exercise Name 5  supine B ER w/ GTB  -CN  --     Cueing 5  Demo  -CN  --     Reps 5  10  -CN  --        Exercise 6    Exercise Name 6  shoudler extension  -CN  --     Cueing 6  Demo  -CN  --     Reps 6  15  -CN  --     Additional Comments  YTB  -CN  --        Exercise 7    Exercise Name 7  Supine HA, RTB  -CN  --     Reps 7  10  -CN  --        Exercise 8    Exercise Name 8  Supine B OH flex with YTB at wrists  -CN  --     Cueing 8  Demo;Verbal  -CN  --     Reps 8  10  -CN  --        Exercise 10    Exercise Name 10  s/l open books, B  -CN  --     Reps 10  10  -CN  --     Time 10  5 sec  -CN  --        Exercise 11    Exercise Name 11  Cervical SNAG into rotation  -CN  --     Cueing 11  Demo  -CN  --     Reps 11  5  -CN  --     Time 11  5 sec  -CN  --       User Key  (r) = Recorded By, (t) = Taken By, (c) = Cosigned By    Initials Name Provider Type    Denisha Hernandez, PT Physical Therapist                        Manual Rx (last 36 hours)      Manual Treatments     Row Name 02/13/20 1100             Total Minutes    76573 - PT Manual Therapy Minutes  12  -CN         Manual Rx 1    Manual Rx 1 Type  STM to UT,levator, scapular mm. R>L  Gentle PA mobs, manual distraction, sub-occipital release  -CN      Manual Rx 1 Grade  gentle  -CN      Manual Rx 1 Duration  12  -CN        User Key  (r) = Recorded By, (t) = Taken By, (c) = Cosigned By    Initials Name Provider Type    Denisha Hernandez, PT Physical Therapist          PT OP Goals     Row Name 02/13/20 1200          PT Short Term Goals    STG 1  Patient will be independent with initial HEP for posture and ROM/flexibility without increased symptoms  -CN     STG 1 Progress   Partially Met  -CN     STG 2  Patient will be educated on and demonstrate knowledge of optimal posture, decompression techniques, stress management/relaxation strategies, ergonomics, good body mechanics, and proper lifting techniques to minimize stress to spine and soft tissues with daily function  -CN     STG 2 Progress  Met  -CN     STG 3  Patient will report >/= 25% increased ease/decreased pain with daily functional activities  -CN     STG 3 Progress  Met  -CN        Long Term Goals    LTG 1  Patient will be independent with established HEP for strength/stabilization to aid in self management of symptoms/condition  -CN     LTG 1 Progress  Met  -CN     LTG 2  Patient will have >/= 50% cervical AROM with minimal pain for safety and increased ease with driving.  -CN     LTG 2 Progress  Partially Met  -CN     LTG 3  Patient will have postural/shoulder girdle strength at least 4/5 for increased stability/tolerance with home/yard management   -CN     LTG 3 Progress  Not Met  -CN     LTG 4  Patient will have pain </= 5/10 with performance of household/community/leisure/recreational activities.  -CN     LTG 4 Progress  Not Met  -CN     LTG 5  Patient will report >/= 50% improvement in quality/quantity of sleep   -CN     LTG 5 Progress  Met  -CN       User Key  (r) = Recorded By, (t) = Taken By, (c) = Cosigned By    Initials Name Provider Type    Denisha Hernandez, PT Physical Therapist          Therapy Education  Education Details: discussed plateau with PT and likely hold on appointments until follow up with MD  Given: HEP, Posture/body mechanics, Symptoms/condition management  Program: Reinforced  How Provided: Verbal, Demonstration  Provided to: Patient  Level of Understanding: Teach back education performed              Time Calculation:   Start Time: 1205  Stop Time: 1255  Time Calculation (min): 50 min  Therapy Charges for Today     Code Description Service Date Service Provider Modifiers Qty     67848992926 HC PT THER PROC EA 15 MIN 2/13/2020 Denisha Worrell, PT GP 2    52104886900 HC PT MANUAL THERAPY EA 15 MIN 2/13/2020 Denisha Worrell, PT GP 1    32966751454 HC PT HOT OR COLD PACK TREAT MCARE 2/13/2020 Denisha Worrell, PT GP 1                OP PT Discharge Summary  Date of Discharge: 02/13/20  Reason for Discharge: Maximum functional potential achieved, Lack of progress  Outcomes Achieved: Patient able to partially acheive established goals  Discharge Destination: Home with home program  Discharge Instructions/Additional Comments: Pt treated for R sided cervical and shoulder pain with moderate improvement in pain levels with PT. Pt with several comorbidities affecting progress and demonstrates plateau with treatment at this time. Pt to follow up with MD regarding continued pain and D/C from skilled PT at this time. Pt may return for follow up pending MD findings.       Denisha Worrell, PT  2/13/2020

## 2020-02-18 ENCOUNTER — APPOINTMENT (OUTPATIENT)
Dept: PHYSICAL THERAPY | Facility: HOSPITAL | Age: 70
End: 2020-02-18

## 2020-02-20 ENCOUNTER — APPOINTMENT (OUTPATIENT)
Dept: PHYSICAL THERAPY | Facility: HOSPITAL | Age: 70
End: 2020-02-20

## 2020-02-25 ENCOUNTER — APPOINTMENT (OUTPATIENT)
Dept: PHYSICAL THERAPY | Facility: HOSPITAL | Age: 70
End: 2020-02-25

## 2020-02-27 ENCOUNTER — APPOINTMENT (OUTPATIENT)
Dept: PHYSICAL THERAPY | Facility: HOSPITAL | Age: 70
End: 2020-02-27

## 2020-06-18 ENCOUNTER — TELEPHONE (OUTPATIENT)
Dept: GASTROENTEROLOGY | Facility: CLINIC | Age: 70
End: 2020-06-18

## 2020-06-18 RX ORDER — CIPROFLOXACIN 500 MG/1
500 TABLET, FILM COATED ORAL 2 TIMES DAILY
Qty: 14 TABLET | Refills: 0 | Status: SHIPPED | OUTPATIENT
Start: 2020-06-18 | End: 2020-06-25

## 2020-06-18 RX ORDER — METRONIDAZOLE 500 MG/1
500 TABLET ORAL 3 TIMES DAILY
Qty: 21 TABLET | Refills: 0 | Status: SHIPPED | OUTPATIENT
Start: 2020-06-18 | End: 2020-06-25

## 2020-06-18 NOTE — TELEPHONE ENCOUNTER
"Call to pt.  Advise per DR Zavala note.  Verb understanding.     F/u appt scheduled with GEMINI Marshall for 7/2 3:15 pm.     Pt states that when took flagyl before \"made me sick as a dog\" - nauseated, diarrhea.  States will take if necessary, but had bad experience.    Message to Dr Zavala.   "

## 2020-06-18 NOTE — TELEPHONE ENCOUNTER
"Call to pt.  Reports h/o diverticulitis Nov, 2019 with wbc >18,000.  Had been doing well since tx with \"powerful abx\".  However,  for past few days, has lower back/abd discomfort - ranks at 7 at worst.  Using heating pad as comfort measure.  States pain is worse right before having BM     Taking bentyl for increased cramping along with pepto.  Yesterday, stools became more urgent - 3-4x.  Denies diarrhea.  Today has had 7 BM's already.  Noted scant amount blood when wiped - knows has hemorrhoids.  Denies fever.      States pain is different than when had divertic, but very worried may be resumption.    Advise will send message to Dr Zavala, and in the meantime - if symptoms OOC, go to ER.  Advise that DR Zavala may need CT for definitive dx.  Pt states hopes not to have CT.  Advise will share this concern, but that may need CT - or other eval, for definitive dx.  Verb understanding.     Message to Dr Zavala.   "

## 2020-06-18 NOTE — TELEPHONE ENCOUNTER
Will empirically treat for diverticulitis given her history.  If symptoms progress or she develops fever, will need to get additional work-up (imaging and possibly stool studies).  Please schedule a follow-up with an NP within the next 2 weeks

## 2020-06-19 ENCOUNTER — TELEPHONE (OUTPATIENT)
Dept: GASTROENTEROLOGY | Facility: CLINIC | Age: 70
End: 2020-06-19

## 2020-06-19 RX ORDER — ONDANSETRON 4 MG/1
4 TABLET, FILM COATED ORAL EVERY 8 HOURS PRN
Qty: 30 TABLET | Refills: 0 | COMMUNITY
End: 2020-06-19 | Stop reason: SDUPTHER

## 2020-06-19 RX ORDER — ONDANSETRON 4 MG/1
4 TABLET, FILM COATED ORAL EVERY 8 HOURS PRN
Qty: 30 TABLET | Refills: 0 | Status: SHIPPED | OUTPATIENT
Start: 2020-06-19 | End: 2020-08-26

## 2020-06-19 NOTE — TELEPHONE ENCOUNTER
Called pt and advised that we have resent her zofran and did receive confirmation.  Pt verb understanding.

## 2020-06-19 NOTE — TELEPHONE ENCOUNTER
Would try to see if she can tolerate it - send zofran 4 mg tid to pharmacy x 30 if she wants it.  Avoid all etoh as this increases n/v.  Call if issues

## 2020-06-19 NOTE — TELEPHONE ENCOUNTER
Call to pt.  Advise per Dr Zavala note.  Verb understanding.  States did start cipro and flagyl yesterday.     Message to Dr Zavala.

## 2020-06-19 NOTE — TELEPHONE ENCOUNTER
----- Message from Claudia Cobb sent at 6/19/2020  3:08 PM EDT -----  Regarding: Zofran Prescription   Contact: [phone]  Patient states she is still waiting for Zofran prescription to be sent to pharmacy (please refer to telephone encounter yesterday).     [id number]   [place], [place]  [address] [facility]  Phone: [phone]  Fax: [phone]

## 2020-06-22 ENCOUNTER — TELEPHONE (OUTPATIENT)
Dept: GASTROENTEROLOGY | Facility: CLINIC | Age: 70
End: 2020-06-22

## 2020-06-22 NOTE — TELEPHONE ENCOUNTER
"Call to pt.   has been taking cipro and flagyl for presumptive divertic as instructed with call of 6/18.   has \"turned that corner on this\" - no more pain.      However, now has had no BM for 4 days and starting to be uncomfortable from constipation.   has been adhering to liquid diet, and has not been taking benefiber.       Asking if may begin adding fiber back into diet, resume benefiber.  Asking how to manage constipation now.     Update/question to Dr Zavala.   "

## 2020-06-22 NOTE — TELEPHONE ENCOUNTER
----- Message from Claudia Cobb Rep sent at 6/22/2020  9:14 AM EDT -----  Regarding: Questions   Contact: [phone]  Being treated for Diverticulitis, she is better but has questions she would like answered.

## 2020-06-22 NOTE — TELEPHONE ENCOUNTER
Resume benefiber - add stool softener or miralax as well (whatever she has) daily until BMs resume

## 2020-07-02 ENCOUNTER — OFFICE VISIT (OUTPATIENT)
Dept: GASTROENTEROLOGY | Facility: CLINIC | Age: 70
End: 2020-07-02

## 2020-07-02 VITALS — HEIGHT: 61 IN | WEIGHT: 131.2 LBS | TEMPERATURE: 97.8 F | BODY MASS INDEX: 24.77 KG/M2

## 2020-07-02 DIAGNOSIS — R19.7 DIARRHEA, UNSPECIFIED TYPE: ICD-10-CM

## 2020-07-02 DIAGNOSIS — K52.839 MICROSCOPIC COLITIS, UNSPECIFIED MICROSCOPIC COLITIS TYPE: ICD-10-CM

## 2020-07-02 DIAGNOSIS — K57.92 DIVERTICULITIS: Primary | ICD-10-CM

## 2020-07-02 DIAGNOSIS — Z86.010 HISTORY OF COLON POLYPS: ICD-10-CM

## 2020-07-02 PROBLEM — Z86.0100 HISTORY OF COLON POLYPS: Status: ACTIVE | Noted: 2020-07-02

## 2020-07-02 PROCEDURE — 99214 OFFICE O/P EST MOD 30 MIN: CPT | Performed by: NURSE PRACTITIONER

## 2020-07-02 RX ORDER — LEVOTHYROXINE SODIUM 88 UG/1
88 TABLET ORAL DAILY
COMMUNITY
Start: 2020-06-17

## 2020-07-02 NOTE — PROGRESS NOTES
Chief Complaint   Patient presents with   • Heartburn       Quyen Jalloh is a  70 y.o. female here for a follow up visit for diverticulitis.    HPI  70-year-old female presents today for follow-up visit for diverticulitis.  She is a patient of Dr. Zavala.  She was last seen in the office on 12/9/2019.  She tells me she is just recently finished antibiotics for her third episode of diverticulitis.  She finished Cipro and Flagyl last Thursday.  She admits since then she is definitely feeling a lot better.  She is still using MiraLAX and she is now back on her Benefiber.  She still slowly introducing back fibrous foods.  She is been really cautious with what she eats.  She thinks this diverticular reticulitis flare was brought on after eating popcorn.  She promises to never eat it again.  She did have diarrhea early on with this episode and was doing well with Pepto-Bismol.  But she is happy report her bowels are moving much more normally now.  She denies any further abdominal pain.  She has a history of microscopic colitis as well as colon polyps.  Normally she has chronic constipation and will have to take Benefiber/MiraLAX and stool softeners daily.  She denies any dysphagia, reflux, nausea and vomiting, constipation, rectal bleeding or melena.  She is appetite is good and her weight has dropped 5 pounds since December.  She does have history of cholecystectomy.  Her last colonoscopy was on 1/2016.  Past Medical History:   Diagnosis Date   • Bloating    • DDD (degenerative disc disease), cervical    • Depression    • Diverticulitis of colon 1/28/2016    Impression: 11/18/2015 - She had diverticulitis last year.  I think this is a mild flare compared to where she was last year.  Will go ahead and put her on Augmentin for 10 days.  Her WBC is normal now.  Impression: 04/10/2014 - Because of her blood in lucy stool, I am ordering the stat CT.  Will treat as outpt as long as there is not perf seen on CT.  I have put  in Augmentin for TX so she doesn't have to use Flagyl.;    • Fibromyalgia     She had been folllowed by Dr. Heineke but he has retired.  She has been unable to find another FM specialist in the area.  She tried seeing Dr. Cordova, but this was a pain clinic and he pushed meds on her that she didn't want to take. She is not interested in narcotics for pain control.  I have agreed to continue her on her current meds and to fill out her disability forms when they come due.  H   • Gallstones    • Hyperlipidemia    • Leukocytosis    • Malignant melanoma of skin (CMS/HCC)    • Non-specific colitis 1/28/2016    Impression: 12/07/2015 - CT from [facility] reviewed.  There is some generalized edematous wall thickening in lucy entire rectosigmoid colon and some in descending colon.  She is tolerating the Augmentin at this point.  I would like for her to see a GI doctor to try to determine what kind of colitis we are dealing with.  She is free to advance her diet as tolerated and I think this will actually help bulk up her stools.  Her WBC has been normal through all of this.;    • Osteopenia 04/13/2015    She will due for dexa next year.[id number] Appointment   • Polyp of sigmoid colon     Due for colonoscopy.  She will contact Dr. Harry Atwood's office to set this up.   • Thyroid disease    • Tubular adenoma of colon 07/31/2014    c-scope Dr. Ornelas - repeat scope 5  years       Past Surgical History:   Procedure Laterality Date   • CHOLECYSTECTOMY     • COLONOSCOPY  01/12/2016    Erythematous mucosa in the entire examined colon, diverticulosis in the sigmoid colon, non-bleeding internal hemorrhoids   • MALIGNANT SKIN LESION EXCISION  2006    legs; melanoma   • TUBAL ABDOMINAL LIGATION         Scheduled Meds:    Continuous Infusions:  No current facility-administered medications for this visit.     PRN Meds:.    Allergies   Allergen Reactions   • Tetracycline Myalgia     Caused abdominal pain 30 years ago per pt    •  Tetracyclines & Related        Social History     Socioeconomic History   • Marital status:      Spouse name: Not on file   • Number of children: Not on file   • Years of education: Not on file   • Highest education level: Not on file   Tobacco Use   • Smoking status: Never Smoker   • Smokeless tobacco: Never Used   Substance and Sexual Activity   • Alcohol use: Yes     Comment: social drinker   • Drug use: No       Family History   Problem Relation Age of Onset   • Cancer Mother         lung   • Coronary artery disease Father    • Glaucoma Father    • Lung cancer Father    • Cancer Father         malignant neoplasm of trachea, bronchus and lung   • Stroke Father    • Heart disease Father        Review of Systems   Constitutional: Negative for appetite change, chills, diaphoresis, fatigue, fever and unexpected weight change.   HENT: Negative for nosebleeds, postnasal drip, sore throat, trouble swallowing and voice change.    Respiratory: Negative for cough, choking, chest tightness, shortness of breath and wheezing.    Cardiovascular: Negative for chest pain, palpitations and leg swelling.   Gastrointestinal: Negative for abdominal distention, abdominal pain, anal bleeding, blood in stool, constipation, diarrhea, nausea, rectal pain and vomiting.   Endocrine: Negative for polydipsia, polyphagia and polyuria.   Musculoskeletal: Negative for gait problem.   Skin: Negative for rash and wound.   Allergic/Immunologic: Negative for food allergies.   Neurological: Negative for dizziness, speech difficulty and light-headedness.   Psychiatric/Behavioral: Negative for confusion, self-injury, sleep disturbance and suicidal ideas.       Vitals:    07/02/20 1532   Temp: 97.8 °F (36.6 °C)       Physical Exam   Constitutional: She is oriented to person, place, and time. She appears well-developed and well-nourished. She does not appear ill. No distress.   HENT:   Head: Normocephalic.   Eyes: Pupils are equal, round, and  reactive to light.   Cardiovascular: Normal rate, regular rhythm and normal heart sounds.   Pulmonary/Chest: Effort normal and breath sounds normal.   Abdominal: Soft. Bowel sounds are normal. She exhibits no distension and no mass. There is no hepatosplenomegaly. There is no tenderness. There is no rebound and no guarding. No hernia.   Musculoskeletal: Normal range of motion.   Neurological: She is alert and oriented to person, place, and time.   Skin: Skin is warm and dry.   Psychiatric: She has a normal mood and affect. Her speech is normal and behavior is normal. Judgment normal.       No radiology results for the last 7 days     Assessment and plan     1. Diverticulitis  - Case Request; Standing  - Case Request    2. History of colon polyps  - Case Request; Standing  - Case Request    3. Microscopic colitis, unspecified microscopic colitis type  - Case Request; Standing  - Case Request    4. Diarrhea, unspecified type  - Case Request; Standing  - Case Request    Overall she seems to be doing much better since finishing her antibiotics.  Her bowels have lined back out and she is having normal bowel movements.  Continue a bland diet and she can slowly start to resume her fiber her food choices.  Continue current bowel regimen of Benefiber, MiraLAX and stool softeners as needed.  She definitely needs to avoid foods with seeds like popcorn.  Since that seems to be a problem for her.  Continue probiotics.  She will be due a screening colonoscopy this fall with Dr. Zavala given her history of colon polyps.  I told her to go ahead and schedule it a little earlier due to this recent flareup of diverticulitis.  That way we could assess healing.  Patient to schedule her colonoscopy today while she is here.  Patient is agreeable to the scope.  Patient to call the office with any issues.  Patient to follow-up with me after her scope.

## 2020-07-23 ENCOUNTER — TELEPHONE (OUTPATIENT)
Dept: GASTROENTEROLOGY | Facility: CLINIC | Age: 70
End: 2020-07-23

## 2020-07-23 DIAGNOSIS — R19.7 DIARRHEA, UNSPECIFIED TYPE: Primary | ICD-10-CM

## 2020-07-23 DIAGNOSIS — K57.92 DIVERTICULITIS: ICD-10-CM

## 2020-07-23 DIAGNOSIS — R10.30 LOWER ABDOMINAL PAIN: ICD-10-CM

## 2020-07-23 LAB
ALBUMIN SERPL-MCNC: 4.4 G/DL (ref 3.5–5.2)
ALBUMIN/GLOB SERPL: 2.3 G/DL
ALP SERPL-CCNC: 35 U/L (ref 39–117)
ALT SERPL-CCNC: 11 U/L (ref 1–33)
AMYLASE SERPL-CCNC: 29 U/L (ref 28–100)
AST SERPL-CCNC: 11 U/L (ref 1–32)
BASOPHILS # BLD AUTO: 0.05 10*3/MM3 (ref 0–0.2)
BASOPHILS NFR BLD AUTO: 0.4 % (ref 0–1.5)
BILIRUB SERPL-MCNC: 0.3 MG/DL (ref 0–1.2)
BUN SERPL-MCNC: 9 MG/DL (ref 8–23)
BUN/CREAT SERPL: 11 (ref 7–25)
CALCIUM SERPL-MCNC: 9.5 MG/DL (ref 8.6–10.5)
CHLORIDE SERPL-SCNC: 99 MMOL/L (ref 98–107)
CO2 SERPL-SCNC: 27.6 MMOL/L (ref 22–29)
CREAT SERPL-MCNC: 0.82 MG/DL (ref 0.57–1)
EOSINOPHIL # BLD AUTO: 0.06 10*3/MM3 (ref 0–0.4)
EOSINOPHIL NFR BLD AUTO: 0.4 % (ref 0.3–6.2)
ERYTHROCYTE [DISTWIDTH] IN BLOOD BY AUTOMATED COUNT: 12.7 % (ref 12.3–15.4)
GLOBULIN SER CALC-MCNC: 1.9 GM/DL
GLUCOSE SERPL-MCNC: 108 MG/DL (ref 65–99)
HCT VFR BLD AUTO: 39.7 % (ref 34–46.6)
HGB BLD-MCNC: 13.6 G/DL (ref 12–15.9)
IMM GRANULOCYTES # BLD AUTO: 0.09 10*3/MM3 (ref 0–0.05)
IMM GRANULOCYTES NFR BLD AUTO: 0.6 % (ref 0–0.5)
LIPASE SERPL-CCNC: 13 U/L (ref 13–60)
LYMPHOCYTES # BLD AUTO: 1.74 10*3/MM3 (ref 0.7–3.1)
LYMPHOCYTES NFR BLD AUTO: 12.4 % (ref 19.6–45.3)
MCH RBC QN AUTO: 29.4 PG (ref 26.6–33)
MCHC RBC AUTO-ENTMCNC: 34.3 G/DL (ref 31.5–35.7)
MCV RBC AUTO: 85.9 FL (ref 79–97)
MONOCYTES # BLD AUTO: 0.97 10*3/MM3 (ref 0.1–0.9)
MONOCYTES NFR BLD AUTO: 6.9 % (ref 5–12)
NEUTROPHILS # BLD AUTO: 11.08 10*3/MM3 (ref 1.7–7)
NEUTROPHILS NFR BLD AUTO: 79.3 % (ref 42.7–76)
NRBC BLD AUTO-RTO: 0 /100 WBC (ref 0–0.2)
PLATELET # BLD AUTO: 335 10*3/MM3 (ref 140–450)
POTASSIUM SERPL-SCNC: 4.5 MMOL/L (ref 3.5–5.2)
PROT SERPL-MCNC: 6.3 G/DL (ref 6–8.5)
RBC # BLD AUTO: 4.62 10*6/MM3 (ref 3.77–5.28)
SODIUM SERPL-SCNC: 138 MMOL/L (ref 136–145)
WBC # BLD AUTO: 13.99 10*3/MM3 (ref 3.4–10.8)

## 2020-07-23 NOTE — TELEPHONE ENCOUNTER
"Call to pt.  States thinks having diverticulitis flare.  Started yesterday afternoon - LLQ pain.  States \"ran over me like a bull dozer last night\".  Denies fever, blood.  Reports spasms occurring prior to BM - normal BM's this am.      States does not want to have CT because does not want to go to hosp.  States if barium is used, she throws up because cannot tolerate artificial sweetener.  States has not had WBC checked - wondering if should start there.     Advise will update M Joanna and in the meantime, stick to clear liquid diet.  Verb understanding.   "

## 2020-07-23 NOTE — TELEPHONE ENCOUNTER
----- Message from Lila Rogers sent at 7/23/2020  8:35 AM EDT -----  Regarding: DIVERTICULITIS  Contact: [phone]  PT NOT FEELING WELL AND HAD A FLARE UP

## 2020-07-23 NOTE — TELEPHONE ENCOUNTER
Called pt and advised of Annemarie's note. Pt verb understanding and will come in today for labs.  Lab work ordered and requistion given to Guille

## 2020-07-23 NOTE — TELEPHONE ENCOUNTER
Yes I agree lets have her come by and get labs today a CBC, CMP, amylase and lipase.  I agree with an all liquid diet.  If she gets worse I would recommend she go to the [facility] ER for immediate evaluation.  Have her call us tomorrow with an update.  If she has an elevated white count we would start antibiotics.  Thanks

## 2020-07-24 ENCOUNTER — TELEPHONE (OUTPATIENT)
Dept: GASTROENTEROLOGY | Facility: CLINIC | Age: 70
End: 2020-07-24

## 2020-07-24 RX ORDER — AMOXICILLIN AND CLAVULANATE POTASSIUM 875; 125 MG/1; MG/1
1 TABLET, FILM COATED ORAL 2 TIMES DAILY
Qty: 20 TABLET | Refills: 0 | Status: SHIPPED | OUTPATIENT
Start: 2020-07-24 | End: 2020-08-26

## 2020-07-24 RX ORDER — CIPROFLOXACIN 500 MG/1
500 TABLET, FILM COATED ORAL 2 TIMES DAILY
Qty: 14 TABLET | Refills: 0 | Status: SHIPPED | OUTPATIENT
Start: 2020-07-24 | End: 2020-07-24 | Stop reason: ALTCHOICE

## 2020-07-24 RX ORDER — METRONIDAZOLE 500 MG/1
500 TABLET ORAL 2 TIMES DAILY
Qty: 14 TABLET | Refills: 0 | Status: SHIPPED | OUTPATIENT
Start: 2020-07-24 | End: 2020-07-24 | Stop reason: ALTCHOICE

## 2020-07-24 NOTE — TELEPHONE ENCOUNTER
She can try Augmentin 875 mg twice daily x10 days instead of the Cipro and Flagyl.  If she still wants to hold off on antibiotics the only choice she really has been that it is a Friday is to go to the [facility] ER for immediate evaluation and get a CT scan of the abdomen and pelvis done which would definitely show whether not she has diverticulitis or not.  But I worry she is got some type of infection with an elevated white count and the symptoms that she is having.

## 2020-07-24 NOTE — TELEPHONE ENCOUNTER
VM to pt with request to contact office.     Escribe completed for cipro and flagyl per M Joanna instructions.  Alert received for interaction with calcium **please instruct pt not to take calcium while on abx**.

## 2020-07-24 NOTE — TELEPHONE ENCOUNTER
Call to pt.  Advise per M Joanna note.  Verb understanding.  Agreeable to Augmentin - escribe completed per M Joanna order.     Pt states if has diarrhea from abx, will take pepto - update to M Joanna.

## 2020-07-24 NOTE — TELEPHONE ENCOUNTER
Returned pt's call and advised of Annemarie's note.  Pt reports that she is just 3 wks off of cipro and flagyl. She states that these meds really messed her system up and she really had bad achilles pain.  Pt state her achilles is still hurting her and she is hesitant to take these antibx.  Pt states she does want antibx but maybe not these  Pt does have benty on hand if needed. Pt reports that her abd pain is gone since going to liquid diet.  Advised pt will send message to Misite NP.

## 2020-07-24 NOTE — TELEPHONE ENCOUNTER
----- Message from GISELA Finley sent at 7/24/2020 10:00 AM EDT -----  Please call the patient and let her know her white count elevated at 13.99.  I definitely think she is possibly suffering from a diverticulitis flare.  Lets start her on Cipro 500 mg p.o. twice daily x7 days.  Also do Flagyl 500 mg p.o. twice daily x7 days.  Have her stop all fiber and probiotics.  Have her do a low residue/bland diet.  If she needs to go to all liquids she can.  If she gets worse through the weekend have her go to the [facility] ER for immediate evaluation.  If she needs something for abdominal pain we can do either Bentyl or Levsin.  Have her call us back Monday with an update.  Have her follow-up with me at my next available.  Thanks

## 2020-08-10 ENCOUNTER — TELEPHONE (OUTPATIENT)
Dept: GASTROENTEROLOGY | Facility: CLINIC | Age: 70
End: 2020-08-10

## 2020-08-10 NOTE — TELEPHONE ENCOUNTER
Yes she needs a follow-up just to make sure she really is okay.  She can do a telephone visit if that would be easier.

## 2020-08-10 NOTE — TELEPHONE ENCOUNTER
----- Message from Claudia Kincaid sent at 8/10/2020  9:09 AM EDT -----  Regarding: follow up  Contact: [phone]  Pt is needing to see if she needs a follow up appt.

## 2020-08-13 ENCOUNTER — TELEPHONE (OUTPATIENT)
Dept: GASTROENTEROLOGY | Facility: CLINIC | Age: 70
End: 2020-08-13

## 2020-08-13 NOTE — TELEPHONE ENCOUNTER
Call to pt.  States past couple days noting tenderness LLQ.  H/o divertic flare x2 since April.  Denies fever.  BM's without problem.  Tylenol with relief.  Has switched to clear liquid diet.      Asking if should try just maintaining clears to see if this will clear up, or how she should proceed.      Update/question to GEMINI Marshall.

## 2020-08-13 NOTE — TELEPHONE ENCOUNTER
----- Message from Lila Rogers sent at 8/13/2020  8:26 AM EDT -----  Regarding: FLARE UP   Contact: [phone]  PT FEELS LIKE HER DIVERTICULITIS IS COMING BACK

## 2020-08-14 NOTE — TELEPHONE ENCOUNTER
Have her continue clears all weekend and see how she does. Have her call us Monday with update. If she gets worse over the weekend have her go to ER. Thanks.

## 2020-08-17 NOTE — TELEPHONE ENCOUNTER
Called pt and pt reports that she is doing fine.  She reports she ended up with a uti and got treatment for it from her pcp.  Advised will update Misite NP.

## 2020-08-26 ENCOUNTER — OFFICE VISIT (OUTPATIENT)
Dept: GASTROENTEROLOGY | Facility: CLINIC | Age: 70
End: 2020-08-26

## 2020-08-26 VITALS — TEMPERATURE: 98.4 F | HEIGHT: 61 IN | WEIGHT: 131.2 LBS | BODY MASS INDEX: 24.77 KG/M2

## 2020-08-26 DIAGNOSIS — D12.6 ADENOMATOUS POLYP OF COLON, UNSPECIFIED PART OF COLON: ICD-10-CM

## 2020-08-26 DIAGNOSIS — K52.839 MICROSCOPIC COLITIS, UNSPECIFIED MICROSCOPIC COLITIS TYPE: ICD-10-CM

## 2020-08-26 DIAGNOSIS — K57.92 DIVERTICULITIS: Primary | ICD-10-CM

## 2020-08-26 DIAGNOSIS — R19.7 DIARRHEA, UNSPECIFIED TYPE: ICD-10-CM

## 2020-08-26 DIAGNOSIS — N30.90 BLADDER INFECTION: ICD-10-CM

## 2020-08-26 PROCEDURE — 99214 OFFICE O/P EST MOD 30 MIN: CPT | Performed by: NURSE PRACTITIONER

## 2020-08-26 NOTE — PROGRESS NOTES
Chief Complaint   Patient presents with   • Diverticulitis       Quyen Jalloh is a  70 y.o. female here for a follow up visit for diverticulitis.    HPI  70-year-old female presents today for follow-up visit for diverticulitis.  She is a patient of Dr. Zavala.  She was last seen in the office on 7/2/2020.  She has a history of diverticulitis and after her last visit with me she thought she had another diverticulitis flare but it turned out to be a UTI.  She took a course of Augmentin for 10 days.  She admits after finishing the Augmentin her lower abdominal and pelvic pain completely resolved.  She feels like her bladder infection is now gone.  Bowels are moving well.  However she still have some occasional issues with fecal urgency.  She does admit she is not taking the Benefiber routinely like she was.  She often wonders if her fecal urgency is not related to anxiety.  She is scheduled for a colonoscopy with Dr. Zavala on October 6.  Patient denies any dysphasia, reflux, abdominal pain, nausea and vomiting, diarrhea, constipation, rectal bleeding or melena.  She continues to take MiraLAX and stool softeners as needed.  She does have a history of microscopic colitis.  Luckily she admits she has not had a flare of that in a long time.  She also has a history of adenomatous colon polyps.  Past Medical History:   Diagnosis Date   • Bloating    • DDD (degenerative disc disease), cervical    • Depression    • Diverticulitis of colon 1/28/2016    Impression: 11/18/2015 - She had diverticulitis last year.  I think this is a mild flare compared to where she was last year.  Will go ahead and put her on Augmentin for 10 days.  Her WBC is normal now.  Impression: 04/10/2014 - Because of her blood in lucy stool, I am ordering the stat CT.  Will treat as outpt as long as there is not perf seen on CT.  I have put in Augmentin for TX so she doesn't have to use Flagyl.;    • Fibromyalgia     She had been folllowed by   Heineke but he has retired.  She has been unable to find another FM specialist in the area.  She tried seeing Dr. Cordova, but this was a pain clinic and he pushed meds on her that she didn't want to take. She is not interested in narcotics for pain control.  I have agreed to continue her on her current meds and to fill out her disability forms when they come due.  H   • Gallstones    • Hyperlipidemia    • Leukocytosis    • Malignant melanoma of skin (CMS/HCC)    • Non-specific colitis 1/28/2016    Impression: 12/07/2015 - CT from [facility] reviewed.  There is some generalized edematous wall thickening in lucy entire rectosigmoid colon and some in descending colon.  She is tolerating the Augmentin at this point.  I would like for her to see a GI doctor to try to determine what kind of colitis we are dealing with.  She is free to advance her diet as tolerated and I think this will actually help bulk up her stools.  Her WBC has been normal through all of this.;    • Osteopenia 04/13/2015    She will due for dexa next year.[id number] Appointment   • Polyp of sigmoid colon     Due for colonoscopy.  She will contact Dr. Harry Atwood's office to set this up.   • Thyroid disease    • Tubular adenoma of colon 07/31/2014    c-scope Dr. Ornelas - repeat scope 5  years       Past Surgical History:   Procedure Laterality Date   • CHOLECYSTECTOMY     • COLONOSCOPY  01/12/2016    Erythematous mucosa in the entire examined colon, diverticulosis in the sigmoid colon, non-bleeding internal hemorrhoids   • MALIGNANT SKIN LESION EXCISION  2006    legs; melanoma   • TUBAL ABDOMINAL LIGATION         Scheduled Meds:    Continuous Infusions:  No current facility-administered medications for this visit.     PRN Meds:.    Allergies   Allergen Reactions   • Tetracycline Myalgia     Caused abdominal pain 30 years ago per pt    • Tetracyclines & Related        Social History     Socioeconomic History   • Marital status:      Spouse name:  Not on file   • Number of children: Not on file   • Years of education: Not on file   • Highest education level: Not on file   Tobacco Use   • Smoking status: Never Smoker   • Smokeless tobacco: Never Used   Substance and Sexual Activity   • Alcohol use: Yes     Comment: social drinker   • Drug use: No       Family History   Problem Relation Age of Onset   • Cancer Mother         lung   • Coronary artery disease Father    • Glaucoma Father    • Lung cancer Father    • Cancer Father         malignant neoplasm of trachea, bronchus and lung   • Stroke Father    • Heart disease Father        Review of Systems   Constitutional: Negative for appetite change, chills, diaphoresis, fatigue, fever and unexpected weight change.   HENT: Negative for nosebleeds, postnasal drip, sore throat, trouble swallowing and voice change.    Respiratory: Negative for cough, choking, chest tightness, shortness of breath and wheezing.    Cardiovascular: Negative for chest pain, palpitations and leg swelling.   Gastrointestinal: Negative for abdominal distention, abdominal pain, anal bleeding, blood in stool, constipation, diarrhea, nausea, rectal pain and vomiting.   Endocrine: Negative for polydipsia, polyphagia and polyuria.   Musculoskeletal: Negative for gait problem.   Skin: Negative for rash and wound.   Allergic/Immunologic: Negative for food allergies.   Neurological: Negative for dizziness, speech difficulty and light-headedness.   Psychiatric/Behavioral: Negative for confusion, self-injury, sleep disturbance and suicidal ideas.       Vitals:    08/26/20 1414   Temp: 98.4 °F (36.9 °C)       Physical Exam   Constitutional: She is oriented to person, place, and time. She appears well-developed and well-nourished. She does not appear ill. No distress.   HENT:   Head: Normocephalic.   Eyes: Pupils are equal, round, and reactive to light.   Cardiovascular: Normal rate, regular rhythm and normal heart sounds.   Pulmonary/Chest: Effort  normal and breath sounds normal.   Abdominal: Soft. Bowel sounds are normal. She exhibits no distension and no mass. There is no hepatosplenomegaly. There is no tenderness. There is no rebound and no guarding. No hernia.   Musculoskeletal: Normal range of motion.   Neurological: She is alert and oriented to person, place, and time.   Skin: Skin is warm and dry.   Psychiatric: She has a normal mood and affect. Her speech is normal and behavior is normal. Judgment normal.       No radiology results for the last 7 days     Assessment and plan     1. Diverticulitis  - CBC & Differential    2. Microscopic colitis, unspecified microscopic colitis type    3. Diarrhea, unspecified type    4. Adenomatous polyp of colon, unspecified part of colon    5. Bladder infection  - CBC & Differential    Overall she seems to be doing much better since finishing Augmentin for a bladder infection.  I think if she did have a touch of diverticulitis trying to start the Augmentin took care of it as well.  Bowels are moving better.  Still having some issues with fecal urgency.  The patient is worried that it could be just anxiety related.  Recommend now that she take the Benefiber more frequently.  We will go ahead and check a CBC today to make sure her white count is back down to normal.  Patient was concerned about this.  She needs to keep her colonoscopy with Dr. Zavala as planned.  Patient to continue a high-fiber diet.  Patient to call the office with any issues.  Patient to follow-up with me after her scope.

## 2020-08-27 LAB
BASOPHILS # BLD AUTO: 0.07 10*3/MM3 (ref 0–0.2)
BASOPHILS NFR BLD AUTO: 0.8 % (ref 0–1.5)
EOSINOPHIL # BLD AUTO: 0.33 10*3/MM3 (ref 0–0.4)
EOSINOPHIL NFR BLD AUTO: 3.5 % (ref 0.3–6.2)
ERYTHROCYTE [DISTWIDTH] IN BLOOD BY AUTOMATED COUNT: 12.8 % (ref 12.3–15.4)
HCT VFR BLD AUTO: 40.8 % (ref 34–46.6)
HGB BLD-MCNC: 13.6 G/DL (ref 12–15.9)
IMM GRANULOCYTES # BLD AUTO: 0.15 10*3/MM3 (ref 0–0.05)
IMM GRANULOCYTES NFR BLD AUTO: 1.6 % (ref 0–0.5)
LYMPHOCYTES # BLD AUTO: 2.75 10*3/MM3 (ref 0.7–3.1)
LYMPHOCYTES NFR BLD AUTO: 29.6 % (ref 19.6–45.3)
MCH RBC QN AUTO: 29.1 PG (ref 26.6–33)
MCHC RBC AUTO-ENTMCNC: 33.3 G/DL (ref 31.5–35.7)
MCV RBC AUTO: 87.4 FL (ref 79–97)
MONOCYTES # BLD AUTO: 0.63 10*3/MM3 (ref 0.1–0.9)
MONOCYTES NFR BLD AUTO: 6.8 % (ref 5–12)
NEUTROPHILS # BLD AUTO: 5.37 10*3/MM3 (ref 1.7–7)
NEUTROPHILS NFR BLD AUTO: 57.7 % (ref 42.7–76)
NRBC BLD AUTO-RTO: 0 /100 WBC (ref 0–0.2)
PLATELET # BLD AUTO: 357 10*3/MM3 (ref 140–450)
RBC # BLD AUTO: 4.67 10*6/MM3 (ref 3.77–5.28)
WBC # BLD AUTO: 9.3 10*3/MM3 (ref 3.4–10.8)

## 2020-08-28 ENCOUNTER — TELEPHONE (OUTPATIENT)
Dept: GASTROENTEROLOGY | Facility: CLINIC | Age: 70
End: 2020-08-28

## 2020-08-28 NOTE — TELEPHONE ENCOUNTER
----- Message from GISELA Finley sent at 8/28/2020  9:38 AM EDT -----  Please call the patient and let her know her white count is completely normal now.  Hemoglobin looks great.  Everything else is normal as well.

## 2020-09-04 ENCOUNTER — TELEPHONE (OUTPATIENT)
Dept: GASTROENTEROLOGY | Facility: CLINIC | Age: 70
End: 2020-09-04

## 2020-09-04 NOTE — TELEPHONE ENCOUNTER
----- Message from Ally Plunkett sent at 9/4/2020 10:06 AM EDT -----  Regarding: DIVERTICUITIS FLARE  DIVERTICULITIS FLARE. PLEASE CALL PT ASAP AT [phone].

## 2020-09-04 NOTE — TELEPHONE ENCOUNTER
Call to pt.  H/o diverticulitis. Reports yesterday developing LLQ catching.  Normal BM.  Today, this discomfort more frequent.  Ranks at 3-4 on pain scale.  Denies fever.  Used pepto 2 days ago - notes stool dark today (advise pt this from pepto).      Has put self on liquid diet.  Very concerned re: return of diverticulitis.  States in the past, had terrible time with flagyl and cipro.   Has tolerated augmentin well.     States when has had episodes divertic in the past, has foul odor from arm pit -this does not occur any other time.  Noting this for past few days.      Hopes to avoid severe exacerbation.  Advise will send message to GEMINI Marshall.  May also contact MD on call over weekend as needed.  If symptoms worsen- ER.  Verb understanding.

## 2020-09-04 NOTE — TELEPHONE ENCOUNTER
For now I do not think she needs any more antibiotics.  She has had quite a few lately.  I think she needs to continue an all liquid diet for the next 2 days.  If she gets worse over the weekend have her either go to the ER or call the provider for our office on call.  Have her call us Tuesday with an update.

## 2020-09-08 ENCOUNTER — TELEPHONE (OUTPATIENT)
Dept: GASTROENTEROLOGY | Facility: CLINIC | Age: 70
End: 2020-09-08

## 2020-09-08 NOTE — TELEPHONE ENCOUNTER
Called pt and pt reports that she is better.  She states she stayed on liquids for 48hrs.  And now she is on a bland diet.   Pt reports she is feeling much better.  Pt denies pain.  Pt states she is going out of town soon and is fearful she may have a diverticulitis flare. Pt states last time this happened she ended up in the hospital and had a bad experience.  Pt states she wanted Dr Zavala to know this in case she did have flare and needed anitbx.  Advised pt will send message to Dr Zavala.

## 2020-09-08 NOTE — TELEPHONE ENCOUNTER
----- Message from Claudia Alcantar sent at 9/8/2020 12:04 PM EDT -----  Regarding: update  Contact: [phone]  Pt is calling to give update of condition to nurse

## 2020-09-09 ENCOUNTER — TELEPHONE (OUTPATIENT)
Dept: GASTROENTEROLOGY | Facility: CLINIC | Age: 70
End: 2020-09-09

## 2020-09-09 DIAGNOSIS — R10.30 LOWER ABDOMINAL PAIN: Primary | ICD-10-CM

## 2020-09-09 NOTE — TELEPHONE ENCOUNTER
Call to pt. States was doing so well yesterday (see note).  Today, again noting twinges LLQ - also tender to palpation.  Denies fever.  Had increased to bland diet yesterday and took 1 tsp benefiber.  BM's without problem.      Again notices unusual odor to axilla as typically has with divertic flare.    In the past, cipro and flagyl have been poorly tolerated.  Did ok on augmentin.     Going oot (6 hr car ride) tomorrow, to see grandaughter and very concerned that above will worsen.  Asking if she should cancel trip - would break her heart.  Also had another trip to see family in 2 wks.     Message to Dr Zavala.

## 2020-09-09 NOTE — TELEPHONE ENCOUNTER
I think the best plan would be for her to go to er, get CT and know for sure what is going on before she leaves town  - this would help to decrease the possibility that she ends up having trouble while she is away    Can treat empirically if she refuses but cannot guarantee that she will not have issues on her trip either way

## 2020-09-09 NOTE — TELEPHONE ENCOUNTER
Called pt and advised of Dr Zavala's note. Pt states she is not going to go to the Er under any circumstances with covid on the rise.  Pt states she can just send in medication and she will only take it if she needs it.  Pt reports when she took cipro and flagyl she developed left achilles tendon issues.  Pt reports that she tolerated augmentin very well.   Advised will send message to Dr Zavala.

## 2020-09-09 NOTE — TELEPHONE ENCOUNTER
----- Message from Claudia Kincaid sent at 9/9/2020  1:15 PM EDT -----  Regarding: diverticilitis  Contact: [phone]  Pt says she is having some issues with her diverticulitis.

## 2020-09-10 RX ORDER — AMOXICILLIN AND CLAVULANATE POTASSIUM 875; 125 MG/1; MG/1
1 TABLET, FILM COATED ORAL 2 TIMES DAILY
Qty: 14 TABLET | Refills: 0 | Status: SHIPPED | OUTPATIENT
Start: 2020-09-10 | End: 2020-09-17

## 2020-09-10 NOTE — TELEPHONE ENCOUNTER
"Ju Ponce, lCaudia Rep  [facility] Clinical 1 Pool             Call about medicine, Pt needs to speak with nurse. [phone]   PT stated she talked to someone yesterday and is going out of town tomorrow. Wants to get antibioticbefore then.      Called pt and advised that Dr Zavala has sent her Augmenting to her pharmacy this am.  Pt verb understanding and states she does note \"mind having a ct scan when she gets back in town on Monday\".  Advised pt will send message to Dr Zavala.     "

## 2020-09-10 NOTE — TELEPHONE ENCOUNTER
Called pt and advised pt that Dr Zavala has ordered the ct scan and she can call [phone] to arrange.   Pt verb understanding.

## 2020-09-17 ENCOUNTER — HOSPITAL ENCOUNTER (OUTPATIENT)
Dept: CT IMAGING | Facility: HOSPITAL | Age: 70
Discharge: HOME | End: 2020-09-17

## 2020-09-17 ENCOUNTER — LAB (OUTPATIENT)
Dept: LAB | Facility: HOSPITAL | Age: 70
End: 2020-09-17

## 2020-09-17 ENCOUNTER — TRANSCRIBE ORDERS (OUTPATIENT)
Dept: ADMINISTRATIVE | Facility: HOSPITAL | Age: 70
End: 2020-09-17

## 2020-09-17 DIAGNOSIS — R10.30 LOWER ABDOMINAL PAIN: ICD-10-CM

## 2020-09-17 DIAGNOSIS — K57.92 DIVERTICULITIS: ICD-10-CM

## 2020-09-17 DIAGNOSIS — K57.92 DIVERTICULITIS: Primary | ICD-10-CM

## 2020-09-17 LAB
BASOPHILS # BLD AUTO: 0.07 10*3/MM3 (ref 0–0.2)
BASOPHILS NFR BLD AUTO: 0.8 % (ref 0–1.5)
DEPRECATED RDW RBC AUTO: 39.8 FL (ref 37–54)
EOSINOPHIL # BLD AUTO: 0.27 10*3/MM3 (ref 0–0.4)
EOSINOPHIL NFR BLD AUTO: 3.2 % (ref 0.3–6.2)
ERYTHROCYTE [DISTWIDTH] IN BLOOD BY AUTOMATED COUNT: 12.8 % (ref 12.3–15.4)
HCT VFR BLD AUTO: 41.9 % (ref 34–46.6)
HGB BLD-MCNC: 13.9 G/DL (ref 12–15.9)
IMM GRANULOCYTES # BLD AUTO: 0.08 10*3/MM3 (ref 0–0.05)
IMM GRANULOCYTES NFR BLD AUTO: 1 % (ref 0–0.5)
LYMPHOCYTES # BLD AUTO: 2.63 10*3/MM3 (ref 0.7–3.1)
LYMPHOCYTES NFR BLD AUTO: 31.5 % (ref 19.6–45.3)
MCH RBC QN AUTO: 28.5 PG (ref 26.6–33)
MCHC RBC AUTO-ENTMCNC: 33.2 G/DL (ref 31.5–35.7)
MCV RBC AUTO: 86 FL (ref 79–97)
MONOCYTES # BLD AUTO: 0.5 10*3/MM3 (ref 0.1–0.9)
MONOCYTES NFR BLD AUTO: 6 % (ref 5–12)
NEUTROPHILS NFR BLD AUTO: 4.79 10*3/MM3 (ref 1.7–7)
NEUTROPHILS NFR BLD AUTO: 57.5 % (ref 42.7–76)
NRBC BLD AUTO-RTO: 0 /100 WBC (ref 0–0.2)
PLATELET # BLD AUTO: 410 10*3/MM3 (ref 140–450)
PMV BLD AUTO: 10.3 FL (ref 6–12)
RBC # BLD AUTO: 4.87 10*6/MM3 (ref 3.77–5.28)
WBC # BLD AUTO: 8.34 10*3/MM3 (ref 3.4–10.8)

## 2020-09-17 PROCEDURE — 36415 COLL VENOUS BLD VENIPUNCTURE: CPT

## 2020-09-17 PROCEDURE — 74177 CT ABD & PELVIS W/CONTRAST: CPT

## 2020-09-17 PROCEDURE — 85025 COMPLETE CBC W/AUTO DIFF WBC: CPT | Performed by: INTERNAL MEDICINE

## 2020-09-17 PROCEDURE — 0 DIATRIZOATE MEGLUMINE & SODIUM PER 1 ML: Performed by: INTERNAL MEDICINE

## 2020-09-17 PROCEDURE — 82565 ASSAY OF CREATININE: CPT

## 2020-09-17 PROCEDURE — 25010000002 IOPAMIDOL 61 % SOLUTION: Performed by: INTERNAL MEDICINE

## 2020-09-17 RX ADMIN — IOPAMIDOL 85 ML: 612 INJECTION, SOLUTION INTRAVENOUS at 13:07

## 2020-09-17 RX ADMIN — DIATRIZOATE MEGLUMINE AND DIATRIZOATE SODIUM 30 ML: 660; 100 LIQUID ORAL; RECTAL at 13:07

## 2020-09-18 ENCOUNTER — TELEPHONE (OUTPATIENT)
Dept: GASTROENTEROLOGY | Facility: CLINIC | Age: 70
End: 2020-09-18

## 2020-09-18 NOTE — TELEPHONE ENCOUNTER
pls let her know that CT confirms diverticulitis - pls take the antibx if she has not already done so- f/u if not improving

## 2020-09-21 LAB — CREAT BLDA-MCNC: 0.9 MG/DL (ref 0.6–1.3)

## 2020-09-30 ENCOUNTER — TRANSCRIBE ORDERS (OUTPATIENT)
Dept: ADMINISTRATIVE | Facility: HOSPITAL | Age: 70
End: 2020-09-30

## 2020-09-30 DIAGNOSIS — Z01.818 OTHER SPECIFIED PRE-OPERATIVE EXAMINATION: Primary | ICD-10-CM

## 2020-10-03 ENCOUNTER — LAB (OUTPATIENT)
Dept: LAB | Facility: HOSPITAL | Age: 70
End: 2020-10-03

## 2020-10-03 DIAGNOSIS — Z01.818 OTHER SPECIFIED PRE-OPERATIVE EXAMINATION: ICD-10-CM

## 2020-10-03 PROCEDURE — C9803 HOPD COVID-19 SPEC COLLECT: HCPCS

## 2020-10-03 PROCEDURE — U0004 COV-19 TEST NON-CDC HGH THRU: HCPCS

## 2020-10-05 LAB — SARS-COV-2 RNA RESP QL NAA+PROBE: NOT DETECTED

## 2020-10-06 ENCOUNTER — HOSPITAL ENCOUNTER (OUTPATIENT)
Facility: HOSPITAL | Age: 70
Setting detail: HOSPITAL OUTPATIENT SURGERY
Discharge: HOME | End: 2020-10-06
Attending: INTERNAL MEDICINE | Admitting: INTERNAL MEDICINE

## 2020-10-06 ENCOUNTER — ANESTHESIA EVENT (OUTPATIENT)
Dept: GASTROENTEROLOGY | Facility: HOSPITAL | Age: 70
End: 2020-10-06

## 2020-10-06 ENCOUNTER — ANESTHESIA (OUTPATIENT)
Dept: GASTROENTEROLOGY | Facility: HOSPITAL | Age: 70
End: 2020-10-06

## 2020-10-06 VITALS
OXYGEN SATURATION: 93 % | BODY MASS INDEX: 23.79 KG/M2 | SYSTOLIC BLOOD PRESSURE: 131 MMHG | DIASTOLIC BLOOD PRESSURE: 80 MMHG | WEIGHT: 126 LBS | TEMPERATURE: 98.2 F | HEART RATE: 83 BPM | RESPIRATION RATE: 16 BRPM | HEIGHT: 61 IN

## 2020-10-06 DIAGNOSIS — K57.92 DIVERTICULITIS: ICD-10-CM

## 2020-10-06 DIAGNOSIS — Z86.010 HISTORY OF COLON POLYPS: ICD-10-CM

## 2020-10-06 DIAGNOSIS — R19.7 DIARRHEA, UNSPECIFIED TYPE: ICD-10-CM

## 2020-10-06 DIAGNOSIS — K52.839 MICROSCOPIC COLITIS, UNSPECIFIED MICROSCOPIC COLITIS TYPE: ICD-10-CM

## 2020-10-06 PROCEDURE — 45385 COLONOSCOPY W/LESION REMOVAL: CPT | Performed by: INTERNAL MEDICINE

## 2020-10-06 PROCEDURE — 88305 TISSUE EXAM BY PATHOLOGIST: CPT | Performed by: INTERNAL MEDICINE

## 2020-10-06 PROCEDURE — S0260 H&P FOR SURGERY: HCPCS | Performed by: INTERNAL MEDICINE

## 2020-10-06 PROCEDURE — 25010000002 PROPOFOL 10 MG/ML EMULSION: Performed by: ANESTHESIOLOGY

## 2020-10-06 RX ORDER — LIDOCAINE HYDROCHLORIDE 20 MG/ML
INJECTION, SOLUTION INFILTRATION; PERINEURAL AS NEEDED
Status: DISCONTINUED | OUTPATIENT
Start: 2020-10-06 | End: 2020-10-06 | Stop reason: SURG

## 2020-10-06 RX ORDER — SODIUM CHLORIDE 0.9 % (FLUSH) 0.9 %
10 SYRINGE (ML) INJECTION AS NEEDED
Status: DISCONTINUED | OUTPATIENT
Start: 2020-10-06 | End: 2020-10-06 | Stop reason: HOSPADM

## 2020-10-06 RX ORDER — SODIUM CHLORIDE 0.9 % (FLUSH) 0.9 %
3 SYRINGE (ML) INJECTION EVERY 12 HOURS SCHEDULED
Status: DISCONTINUED | OUTPATIENT
Start: 2020-10-06 | End: 2020-10-06 | Stop reason: HOSPADM

## 2020-10-06 RX ORDER — PROPOFOL 10 MG/ML
VIAL (ML) INTRAVENOUS CONTINUOUS PRN
Status: DISCONTINUED | OUTPATIENT
Start: 2020-10-06 | End: 2020-10-06 | Stop reason: SURG

## 2020-10-06 RX ORDER — SODIUM CHLORIDE, SODIUM LACTATE, POTASSIUM CHLORIDE, CALCIUM CHLORIDE 600; 310; 30; 20 MG/100ML; MG/100ML; MG/100ML; MG/100ML
30 INJECTION, SOLUTION INTRAVENOUS CONTINUOUS PRN
Status: DISCONTINUED | OUTPATIENT
Start: 2020-10-06 | End: 2020-10-06 | Stop reason: HOSPADM

## 2020-10-06 RX ORDER — PROPOFOL 10 MG/ML
VIAL (ML) INTRAVENOUS AS NEEDED
Status: DISCONTINUED | OUTPATIENT
Start: 2020-10-06 | End: 2020-10-06 | Stop reason: SURG

## 2020-10-06 RX ADMIN — PROPOFOL 120 MG: 10 INJECTION, EMULSION INTRAVENOUS at 09:55

## 2020-10-06 RX ADMIN — PROPOFOL 160 MCG/KG/MIN: 10 INJECTION, EMULSION INTRAVENOUS at 09:55

## 2020-10-06 RX ADMIN — LIDOCAINE HYDROCHLORIDE 60 MG: 20 INJECTION, SOLUTION INFILTRATION; PERINEURAL at 09:55

## 2020-10-06 RX ADMIN — PROPOFOL 30 MG: 10 INJECTION, EMULSION INTRAVENOUS at 10:08

## 2020-10-06 RX ADMIN — PROPOFOL 30 MG: 10 INJECTION, EMULSION INTRAVENOUS at 10:04

## 2020-10-06 RX ADMIN — PROPOFOL 40 MG: 10 INJECTION, EMULSION INTRAVENOUS at 09:59

## 2020-10-06 RX ADMIN — SODIUM CHLORIDE, POTASSIUM CHLORIDE, SODIUM LACTATE AND CALCIUM CHLORIDE 30 ML/HR: 600; 310; 30; 20 INJECTION, SOLUTION INTRAVENOUS at 09:46

## 2020-10-06 RX ADMIN — PROPOFOL 30 MG: 10 INJECTION, EMULSION INTRAVENOUS at 10:11

## 2020-10-06 RX ADMIN — PROPOFOL 40 MG: 10 INJECTION, EMULSION INTRAVENOUS at 10:02

## 2020-10-06 NOTE — H&P
[facility]  Pre Procedure History & Physical    Chief Complaint:   H/o adenomatous polyp    Subjective     HPI:   71 yo with h/o adenomatous polyp.  H/o recurrent diverticulitis.  Last c/s 1/16.    Past Medical History:   Past Medical History:   Diagnosis Date   • Bloating    • DDD (degenerative disc disease), cervical    • Depression    • Diverticulitis of colon 1/28/2016    Impression: 11/18/2015 - She had diverticulitis last year.  I think this is a mild flare compared to where she was last year.  Will go ahead and put her on Augmentin for 10 days.  Her WBC is normal now.  Impression: 04/10/2014 - Because of her blood in lucy stool, I am ordering the stat CT.  Will treat as outpt as long as there is not perf seen on CT.  I have put in Augmentin for TX so she doesn't have to use Flagyl.;    • Fibromyalgia     She had been folllowed by Dr. Heineke but he has retired.  She has been unable to find another FM specialist in the area.  She tried seeing Dr. Cordova, but this was a pain clinic and he pushed meds on her that she didn't want to take. She is not interested in narcotics for pain control.  I have agreed to continue her on her current meds and to fill out her disability forms when they come due.  H   • Gallstones    • Hyperlipidemia    • Leukocytosis    • Malignant melanoma of skin (CMS/HCC)    • Non-specific colitis 1/28/2016    Impression: 12/07/2015 - CT from [facility] reviewed.  There is some generalized edematous wall thickening in lucy entire rectosigmoid colon and some in descending colon.  She is tolerating the Augmentin at this point.  I would like for her to see a GI doctor to try to determine what kind of colitis we are dealing with.  She is free to advance her diet as tolerated and I think this will actually help bulk up her stools.  Her WBC has been normal through all of this.;    • Osteopenia 04/13/2015    She will due for dexa next year.[id number] Appointment   • Polyp of  sigmoid colon     Due for colonoscopy.  She will contact Dr. Harry Atwood's office to set this up.   • Thyroid disease    • Tubular adenoma of colon 07/31/2014    c-scope Dr. Ornelas - repeat scope 5  years       Past Surgical History:  Past Surgical History:   Procedure Laterality Date   • CHOLECYSTECTOMY     • COLONOSCOPY  01/12/2016    Erythematous mucosa in the entire examined colon, diverticulosis in the sigmoid colon, non-bleeding internal hemorrhoids   • MALIGNANT SKIN LESION EXCISION  2006    legs; melanoma   • TUBAL ABDOMINAL LIGATION         Family History:  Family History   Problem Relation Age of Onset   • Cancer Mother         lung   • Coronary artery disease Father    • Glaucoma Father    • Lung cancer Father    • Cancer Father         malignant neoplasm of trachea, bronchus and lung   • Stroke Father    • Heart disease Father        Social History:   reports that she has never smoked. She has never used smokeless tobacco. She reports current alcohol use. She reports that she does not use drugs.    Medications:   Medications Prior to Admission   Medication Sig Dispense Refill Last Dose   • buPROPion XL (WELLBUTRIN XL) 300 MG 24 hr tablet Take 1 tablet by mouth Daily. 90 tablet 1    • CALCIUM PO Take by mouth.      • Cholecalciferol (VITAMIN D-3) 1000 UNITS capsule Take by mouth. Take as directed      • dicyclomine (BENTYL) 20 MG tablet Take 1 tablet by mouth 4 (Four) Times a Day Before Meals & at Bedtime As Needed (diarrhea). 180 tablet 2    • fenofibrate (TRICOR) 145 MG tablet TAKE 1 TABLET BY MOUTH EVERY DAY 30 tablet 3    • levothyroxine (SYNTHROID, LEVOTHROID) 88 MCG tablet Take 88 mcg by mouth Daily.      • Probiotic Product (PROBIOTIC PO) Take  by mouth.      • traMADol (ULTRAM) 50 MG tablet Take 50 mg by mouth Every 6 (Six) Hours As Needed. for pain  0    • traZODone (DESYREL) 50 MG tablet Take 1 tablet by mouth every night at bedtime. 30 tablet 0        Allergies:  Tetracycline and Tetracyclines  & related    ROS:    Pertinent items are noted in HPI, all other systems reviewed and negative     Objective     There were no vitals taken for this visit.    Physical Exam   Constitutional: Pt is oriented to person, place, and time and well-developed, well-nourished, and in no distress.   Mouth/Throat: Oropharynx is clear and moist.   Neck: Normal range of motion.   Cardiovascular: Normal rate, regular rhythm     Pulmonary/Chest: Effort normal    Abdominal: Soft. Nontender  Skin: Skin is warm and dry.   Psychiatric: Mood, memory, affect and judgment normal.     Assessment/Plan     Diagnosis:  H/o adenomatous polyp    Anticipated Surgical Procedure:  colonoscopy    The risks, benefits, and alternatives of this procedure have been discussed with the patient or the responsible party- the patient understands and agrees to proceed.

## 2020-10-06 NOTE — ANESTHESIA PREPROCEDURE EVALUATION
Anesthesia Evaluation     Patient summary reviewed   NPO Solid Status: > 8 hours  NPO Liquid Status: > 2 hours           Airway   Mallampati: I  TM distance: >3 FB  Neck ROM: full  Dental      Pulmonary     breath sounds clear to auscultation  (-) shortness of breath, not a smoker  Cardiovascular   Exercise tolerance: good (4-7 METS)    Rhythm: regular  Rate: normal    (+) hyperlipidemia,   (-) angina, GOVEA      Neuro/Psych  (+) psychiatric history,     GI/Hepatic/Renal/Endo      Musculoskeletal     (+) neck pain,   Abdominal    Substance History      OB/GYN          Other   arthritis,                      Anesthesia Plan    ASA 2     MAC   (MAC anesthesia discussed with patient and/or patient representative. Risks (including but not limited to intra-op awareness), benefits, and alternatives were discussed. Understanding was voiced with an agreement to proceed with a MAC technique and General as a backup option.   )  intravenous induction     Anesthetic plan, all risks, benefits, and alternatives have been provided, discussed and informed consent has been obtained with: patient.

## 2020-10-06 NOTE — ANESTHESIA POSTPROCEDURE EVALUATION
"Patient: Quyen Jalloh    Procedure Summary     Date: 10/06/20 Room / Location: [facility] ENDOSCOPY 6 /   ENDOSCOPY    Anesthesia Start: 0950 Anesthesia Stop: 1022    Procedure: COLONOSCOPY to cecum and TI with biopsies / cold snare polypectomy (N/A ) Diagnosis:       Diverticulitis      History of colon polyps      Microscopic colitis, unspecified microscopic colitis type      Diarrhea, unspecified type      (Diverticulitis [K57.92])      (History of colon polyps [Z86.010])      (Microscopic colitis, unspecified microscopic colitis type [K52.839])      (Diarrhea, unspecified type [R19.7])    Surgeon: Ly Zavala MD Provider: Dax Meraz MD    Anesthesia Type: MAC ASA Status: 2          Anesthesia Type: MAC    Vitals  No vitals data found for the desired time range.          Post Anesthesia Care and Evaluation    Patient location during evaluation: bedside  Patient participation: complete - patient participated  Level of consciousness: awake and alert  Pain management: adequate  Airway patency: patent  Anesthetic complications: No anesthetic complications    Cardiovascular status: acceptable  Respiratory status: acceptable  Hydration status: acceptable    Comments: BP (P) 125/67   Pulse (P) 80   Temp 36.8 °C (98.2 °F) (Oral)   Resp (P) 16   Ht 154.9 cm (61\")   Wt 57.2 kg (126 lb)   SpO2 (P) 100%   BMI 23.81 kg/m²       "

## 2020-10-07 LAB
LAB AP CASE REPORT: NORMAL
LAB AP CLINICAL INFORMATION: NORMAL
LAB AP DIAGNOSIS COMMENT: NORMAL
PATH REPORT.FINAL DX SPEC: NORMAL
PATH REPORT.GROSS SPEC: NORMAL

## 2020-10-08 ENCOUNTER — TELEPHONE (OUTPATIENT)
Dept: GASTROENTEROLOGY | Facility: CLINIC | Age: 70
End: 2020-10-08

## 2020-10-08 NOTE — TELEPHONE ENCOUNTER
The polyp(s) biopsies showed adenomatous change. This is not cancerous but is considered potentially precancerous. Follow-up colonoscopy in 5 years is advised.       Minimally elevated number of inflammation cells seen on random colon bx c/w known h/o microscopic colitis

## 2020-10-12 NOTE — TELEPHONE ENCOUNTER
Call to pt.  Advise per DR Zavala note.  Verb understanding.     C/s for 10/6/25 is in recall.     Asking if anything other than fiber she should be doing to prevent future episodes of diverticulitis.  Question to Dr Zavala.

## 2020-10-14 NOTE — TELEPHONE ENCOUNTER
Fiber supplement daily, prevent constipation-no other recommendations unfortunately to prevent further issues

## 2020-10-14 NOTE — TELEPHONE ENCOUNTER
05/31/18 0530   Cervical Exam   Dilation (cm) 1   Effacement (%) 60   Station -2   Cervical Position Mid   Cervical Consistency 2   Recinos's Score 7   Presentation Vertex   Method Manual   OB Examiner Nurse      Called pt and advised of Dr Zavala's note. Pt verb understanding.

## 2020-11-30 ENCOUNTER — OFFICE VISIT (OUTPATIENT)
Dept: SURGERY | Facility: CLINIC | Age: 70
End: 2020-11-30

## 2020-11-30 VITALS — BODY MASS INDEX: 25.3 KG/M2 | HEIGHT: 61 IN | WEIGHT: 134 LBS

## 2020-11-30 DIAGNOSIS — K57.32 DIVERTICULITIS OF LARGE INTESTINE WITHOUT PERFORATION OR ABSCESS WITHOUT BLEEDING: Primary | ICD-10-CM

## 2020-11-30 PROCEDURE — 99204 OFFICE O/P NEW MOD 45 MIN: CPT | Performed by: SURGERY

## 2020-12-03 NOTE — PROGRESS NOTES
SUMMARY (A/P):    70-year-old lady with chronic recurrent diverticulitis.  We discussed the natural history of diverticulitis and the certainty of recurrent episodes given the number of documented episodes she has had thus far.  She is inclined to proceed with surgical resection but would prefer to wait for current degree of Covid issues to subside.  She understands that no guarantee can be made that she would not have a complicated course of diverticulitis to ensue, but does also understand that this is not terribly likely to happen.  We discussed laparoscopic sigmoid colectomy including the rationale for the procedure, the nature of the procedure, and the risks including but not limited to bleeding, infection, conversion to open procedure, and anastomotic leak requiring reoperation and temporary ostomy.      CC:    Diverticulitis    HPI:    70-year-old lady with 6-year history of chronic recurrent diverticulitis.  Episodes consist of moderately severe left lower quadrant abdominal pain.  Episodes have not been associated with fever or chills.  Each episode has resolved with oral antibiotics.  Most recent episode November 2019.    RADIOLOGY/ENDOSCOPY:    • She has had multiple CT scans of the abdomen pelvis at [facility].  CT scans on 9/17/2020, 12/4/2015, and 4/10/2014 have all shown sigmoid diverticulitis.  I reviewed images from multiple CT scans that she has had and concur with the interpretation.  • Colonoscopy 10/6/2020 with diverticulosis in sigmoid and descending colon, ascending colon tubular adenoma    PHYSICAL EXAM:   • Constitutional: Well-developed well-nourished, no acute distress  • Vital signs: Weight 134 pounds, height 61 inches, BMI 25.3  • Eyes: Conjunctiva normal, sclera nonicteric  • ENMT: Hearing grossly normal, oral mucosa moist  • Neck: Supple, no palpable mass, trachea midline  • Respiratory: Clear to auscultation, normal inspiratory effort  • Cardiovascular: Regular rate, no murmur, no  carotid bruit, no peripheral edema, no jugular venous distention  • Gastrointestinal: Soft, nontender, no palpable mass, no hepatosplenomegaly, negative for hernia, bowel sounds normal  • Lymphatics (palpable nodes):  cervical-negative, axillary-negative  • Skin:  Warm, dry, no rash on visualized skin surfaces  • Musculoskeletal: Symmetric strength, normal gait  • Psychiatric: Alert and oriented ×3, normal affect     ALLERGIES:   • Tetracycline-myalgia    MEDICATIONS:   • Wellbutrin  • TriCor  • Synthroid  • Ultram  • Trazodone    PMH:    • Chronic recurrent diverticulitis  • Osteopenia  • Adenomatous polyps  • Depression  • Fibromyalgia  • Hypothyroidism  • Hyperlipidemia    PSH:    • Cholecystectomy  • Tubal ligation    FAMILY HISTORY:    • Negative for colorectal cancer    SOCIAL HISTORY:   • Denies tobacco use  • Occasional alcohol use    ROS:    Influenza Like Illness: no fever, no  cough, no  sore throat, no  body aches, no loss of sense of taste or smell, no known exposure to person with Covid-19.  All other systems reviewed and negative other than presenting complaints.    SHEYRL CASTORENA M.D.

## 2021-03-19 ENCOUNTER — BULK ORDERING (OUTPATIENT)
Dept: CASE MANAGEMENT | Facility: OTHER | Age: 71
End: 2021-03-19

## 2021-03-19 DIAGNOSIS — Z23 IMMUNIZATION DUE: ICD-10-CM

## 2021-07-02 ENCOUNTER — APPOINTMENT (OUTPATIENT)
Dept: WOMENS IMAGING | Facility: HOSPITAL | Age: 71
End: 2021-07-02

## 2021-07-02 PROCEDURE — 77067 SCR MAMMO BI INCL CAD: CPT | Performed by: RADIOLOGY

## 2021-07-02 PROCEDURE — 77063 BREAST TOMOSYNTHESIS BI: CPT | Performed by: RADIOLOGY

## 2021-10-01 ENCOUNTER — IMMUNIZATION (OUTPATIENT)
Dept: VACCINE CLINIC | Facility: HOSPITAL | Age: 71
End: 2021-10-01

## 2021-10-01 PROCEDURE — 0001A: CPT | Performed by: INTERNAL MEDICINE

## 2021-10-01 PROCEDURE — 91300 HC SARSCOV02 VAC 30MCG/0.3ML IM: CPT | Performed by: INTERNAL MEDICINE

## 2022-07-12 ENCOUNTER — HOSPITAL ENCOUNTER (EMERGENCY)
Facility: HOSPITAL | Age: 72
Discharge: HOME | End: 2022-07-12
Attending: EMERGENCY MEDICINE | Admitting: EMERGENCY MEDICINE

## 2022-07-12 ENCOUNTER — APPOINTMENT (OUTPATIENT)
Dept: GENERAL RADIOLOGY | Facility: HOSPITAL | Age: 72
End: 2022-07-12

## 2022-07-12 VITALS
OXYGEN SATURATION: 98 % | HEART RATE: 95 BPM | BODY MASS INDEX: 24.92 KG/M2 | HEIGHT: 61 IN | SYSTOLIC BLOOD PRESSURE: 159 MMHG | RESPIRATION RATE: 16 BRPM | TEMPERATURE: 98.7 F | WEIGHT: 132 LBS | DIASTOLIC BLOOD PRESSURE: 77 MMHG

## 2022-07-12 DIAGNOSIS — S62.101A CLOSED FRACTURE OF RIGHT WRIST, INITIAL ENCOUNTER: Primary | ICD-10-CM

## 2022-07-12 PROCEDURE — 96374 THER/PROPH/DIAG INJ IV PUSH: CPT

## 2022-07-12 PROCEDURE — 25010000002 ONDANSETRON PER 1 MG: Performed by: EMERGENCY MEDICINE

## 2022-07-12 PROCEDURE — 0 LIDOCAINE 1 % SOLUTION: Performed by: EMERGENCY MEDICINE

## 2022-07-12 PROCEDURE — 25010000002 HYDROMORPHONE PER 4 MG: Performed by: EMERGENCY MEDICINE

## 2022-07-12 PROCEDURE — 73110 X-RAY EXAM OF WRIST: CPT

## 2022-07-12 PROCEDURE — 73100 X-RAY EXAM OF WRIST: CPT

## 2022-07-12 PROCEDURE — 25010000002 MORPHINE PER 10 MG: Performed by: EMERGENCY MEDICINE

## 2022-07-12 PROCEDURE — 99283 EMERGENCY DEPT VISIT LOW MDM: CPT

## 2022-07-12 PROCEDURE — 96375 TX/PRO/DX INJ NEW DRUG ADDON: CPT

## 2022-07-12 RX ORDER — LIDOCAINE HYDROCHLORIDE 10 MG/ML
10 INJECTION, SOLUTION INFILTRATION; PERINEURAL ONCE
Status: COMPLETED | OUTPATIENT
Start: 2022-07-12 | End: 2022-07-12

## 2022-07-12 RX ORDER — ONDANSETRON 4 MG/1
4 TABLET, ORALLY DISINTEGRATING ORAL 4 TIMES DAILY PRN
Qty: 15 TABLET | Refills: 0 | Status: SHIPPED | OUTPATIENT
Start: 2022-07-12

## 2022-07-12 RX ORDER — HYDROCODONE BITARTRATE AND ACETAMINOPHEN 5; 325 MG/1; MG/1
1 TABLET ORAL EVERY 6 HOURS PRN
Qty: 8 TABLET | Refills: 0 | Status: SHIPPED | OUTPATIENT
Start: 2022-07-12

## 2022-07-12 RX ORDER — HYDROMORPHONE HYDROCHLORIDE 1 MG/ML
0.5 INJECTION, SOLUTION INTRAMUSCULAR; INTRAVENOUS; SUBCUTANEOUS ONCE
Status: COMPLETED | OUTPATIENT
Start: 2022-07-12 | End: 2022-07-12

## 2022-07-12 RX ORDER — MORPHINE SULFATE 2 MG/ML
4 INJECTION, SOLUTION INTRAMUSCULAR; INTRAVENOUS ONCE
Status: COMPLETED | OUTPATIENT
Start: 2022-07-12 | End: 2022-07-12

## 2022-07-12 RX ORDER — ONDANSETRON 2 MG/ML
4 INJECTION INTRAMUSCULAR; INTRAVENOUS ONCE
Status: COMPLETED | OUTPATIENT
Start: 2022-07-12 | End: 2022-07-12

## 2022-07-12 RX ORDER — TRAMADOL HYDROCHLORIDE 50 MG/1
50 TABLET ORAL EVERY 8 HOURS PRN
Qty: 10 TABLET | Refills: 0 | Status: SHIPPED | OUTPATIENT
Start: 2022-07-12 | End: 2022-07-12 | Stop reason: ALTCHOICE

## 2022-07-12 RX ORDER — SODIUM CHLORIDE 0.9 % (FLUSH) 0.9 %
10 SYRINGE (ML) INJECTION AS NEEDED
Status: DISCONTINUED | OUTPATIENT
Start: 2022-07-12 | End: 2022-07-12 | Stop reason: HOSPADM

## 2022-07-12 RX ADMIN — HYDROMORPHONE HYDROCHLORIDE 0.5 MG: 1 INJECTION, SOLUTION INTRAMUSCULAR; INTRAVENOUS; SUBCUTANEOUS at 11:20

## 2022-07-12 RX ADMIN — LIDOCAINE HYDROCHLORIDE 10 ML: 10 INJECTION, SOLUTION INFILTRATION; PERINEURAL at 12:00

## 2022-07-12 RX ADMIN — ONDANSETRON 4 MG: 2 INJECTION INTRAMUSCULAR; INTRAVENOUS at 10:37

## 2022-07-12 RX ADMIN — MORPHINE SULFATE 4 MG: 2 INJECTION, SOLUTION INTRAMUSCULAR; INTRAVENOUS at 10:39

## 2022-07-12 NOTE — ED PROVIDER NOTES
MD ATTESTATION NOTE    The ROSALES and I have discussed this patient's history, physical exam, and treatment plan.  I have reviewed the documentation and personally had a face to face interaction with the patient. I affirm the documentation and agree with the treatment and plan.  The attached note describes my personal findings.      I provided a substantive portion of the care of the patient.  I personally performed the physical exam in its entirety, and below are my findings.  For this patient encounter, the patient wore surgical mask, I wore full protective PPE including N95 and eye protection.      Brief HPI: Patient presents with complaint of right wrist pain after a fall on her patio.    PHYSICAL EXAM  ED Triage Vitals   Temp Heart Rate Resp BP SpO2   07/12/22 0937 07/12/22 0936 07/12/22 0936 07/12/22 1032 07/12/22 0936   98.7 °F (37.1 °C) 88 16 (!) 190/78 98 %      Temp src Heart Rate Source Patient Position BP Location FiO2 (%)   07/12/22 0936 07/12/22 0936 07/12/22 1032 07/12/22 1032 --   Tympanic Monitor Lying Left arm          GENERAL: Awake and alert, no acute distress  HENT: nares patent  EYES: no scleral icterus, EOMI  CV: regular rhythm, normal rate  RESPIRATORY: normal effort  ABDOMEN: soft  MUSCULOSKELETAL: no deformity, right wrist now splinted, brisk cap refill in all digits right hand, normal flexion and extension at all PIP and DIP joints.  NEURO: alert, moves all extremities, follows commands, normal sensation to light touch throughout all fingers of the right hand  PSYCH:  calm, cooperative  SKIN: warm, dry    Vital signs and nursing notes reviewed.        Plan: Patient will follow-up with orthopedic hand surgery today.  I had initially prescribed her tramadol for pain however she reports that it does not typically help her.  I have thus changed this to hydrocodone as needed.  Jacinto reviewed.     Felipe Sanchez MD  07/12/22 0470

## 2022-07-12 NOTE — ED TRIAGE NOTES
Tripped and fell onto patio this am.  C/o right wrist pain - it is deformed    Patient was placed in face mask during first look triage.  Patient was wearing a face mask throughout encounter.  I wore personal protective equipment throughout the encounter.  Hand hygiene was performed before and after patient encounter.

## 2022-07-12 NOTE — ED PROVIDER NOTES
EMERGENCY DEPARTMENT ENCOUNTER    Room Number:  24/24  Date of encounter:  7/12/2022  PCP: Veronica Dunaway MD  Historian: Patient      I used full protective equipment while examining this patient.  This includes face mask, gloves and protective eyewear.  I washed my hands before entering the room and immediately upon leaving the room      HPI:  Chief Complaint: Fall  A complete HPI/ROS/PMH/PSH/SH/FH are unobtainable due to: Nothing    Context: Quyen Jalloh is a 72 y.o. female who presents to the ED c/o injuries sustained in a mechanical fall prior to arrival.  Patient states she was pulling weeds when she was attempting to step over a piece of wood when she lost balance and fell.  Patient fell on her outstretched right hand.  She denies any head, neck, trunk injuries.  She describes moderate, achy, constant pain to the right wrist.  Pain is worse with movement.  She denies any numbness or tingling distally.  She is right-handed.    Review of Medical Records  No pertinent previous medical records.    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   • Decreased hearing 01/28/2016   • Fatigue 01/28/2016   • Hyperlipidemia 01/28/2016   • Hypothyroidism 01/28/2016   • Insomnia 01/28/2016   • Seasonal allergic rhinitis 01/28/2016   • Clicking shoulder 01/28/2016   • Shoulder pain 01/28/2016   • Vitamin D deficiency 01/28/2016   • Osteopenia 05/04/2016   • Fibromyalgia 11/15/2016   • Neck pain 06/06/2018   • Microscopic colitis 07/17/2019   • Adenomatous polyp of colon 07/17/2019   • Diverticulitis 07/02/2020   • History of colon polyps 07/02/2020   • Diarrhea 07/02/2020     Resolved Ambulatory Problems     Diagnosis Date Noted   • Non-specific colitis 01/28/2016   • Diverticulitis of colon 01/28/2016     Past Medical History:   Diagnosis Date   • Arthritis    • Bloating    • DDD (degenerative disc disease), cervical    • Depression    •  Diverticulosis    • Gallstones    • Leukocytosis    • Malignant melanoma of skin (HCC)    • Polyp of sigmoid colon    • Thyroid disease    • Tubular adenoma of colon 07/31/2014         PAST SURGICAL HISTORY  Past Surgical History:   Procedure Laterality Date   • CHOLECYSTECTOMY     • COLONOSCOPY  01/12/2016    Erythematous mucosa in the entire examined colon, diverticulosis in the sigmoid colon, non-bleeding internal hemorrhoids   • COLONOSCOPY N/A 10/6/2020    Procedure: COLONOSCOPY to cecum and TI with biopsies / cold snare polypectomy;  Surgeon: Ly Zavala MD;  Location: [facility] ENDOSCOPY;  Service: Gastroenterology;  Laterality: N/A;  pre-hx polyps  post-diverticulosis, polyp, hemorrhoids   • MALIGNANT SKIN LESION EXCISION  2006    legs; melanoma   • TUBAL ABDOMINAL LIGATION           FAMILY HISTORY  Family History   Problem Relation Age of Onset   • Cancer Mother         lung   • Coronary artery disease Father    • Glaucoma Father    • Lung cancer Father    • Cancer Father         malignant neoplasm of trachea, bronchus and lung   • Stroke Father    • Heart disease Father          SOCIAL HISTORY  Social History     Socioeconomic History   • Marital status:    Tobacco Use   • Smoking status: Never Smoker   • Smokeless tobacco: Never Used   Substance and Sexual Activity   • Alcohol use: Yes     Comment: social drinker   • Drug use: No   • Sexual activity: Defer         ALLERGIES  Tetracycline and Tetracyclines & related        REVIEW OF SYSTEMS  All systems reviewed and negative except for those discussed in HPI.       PHYSICAL EXAM    I have reviewed the triage vital signs and nursing notes.    ED Triage Vitals   Temp Heart Rate Resp BP SpO2   07/12/22 0937 07/12/22 0936 07/12/22 0936 -- 07/12/22 0936   98.7 °F (37.1 °C) 88 16  98 %      Temp src Heart Rate Source Patient Position BP Location FiO2 (%)   07/12/22 0936 07/12/22 0936 -- -- --   Tympanic Monitor          Physical Exam    GENERAL:  Alert, oriented, not distressed  HENT: head atraumatic, no nuchal rigidity  EYES: no scleral icterus, EOMI  CV: regular rhythm, regular rate, no murmur  RESPIRATORY: normal effort, CTA  ABDOMEN: soft, nontender  MUSCULOSKELETAL: Moderate tenderness and mild swelling with deformity to distal right wrist.  Neurovascularly intact distally.  Skin intact.  NEURO: alert, moves all extremities, follows commands  SKIN: warm, dry    RADIOLOGY  XR Wrist 2 View Right    Result Date: 7/12/2022  XR WRIST 2 VW RIGHT-  INDICATIONS: Postreduction evaluation.  TECHNIQUE: 2 views of the right wrist  COMPARISON: 07/12/2022  FINDINGS:  Partially improved alignment of the fracture at the distal radial metaphysis is seen, with overlying cast material that obscures some bony detail. Degenerative change is seen predominantly at the lateral aspect of the wrist.       As described.  This report was finalized on 7/12/2022 12:41 PM by Dr. Yasir Navarro M.D.      XR Wrist 3+ View Right    Result Date: 7/12/2022  XR WRIST 3+ VW RIGHT-  07/12/2022  HISTORY: Fell, right wrist injury.    There is a transverse fracture involving the distal metaphyseal region of the right wrist which is mild to moderately displaced with some impaction. Fracture appears involved the distal articular surface.  Distal ulna and carpal bones appear intact. There are some mild degenerative arthritis of the first carpal metacarpal joint.      1. Distal right radius fracture.  This report was finalized on 7/12/2022 11:23 AM by Dr. Juan F Yeung M.D.        I ordered the above noted radiological studies. Reviewed by me and discussed with radiologist.  See dictation for official radiology interpretation.    Upper Extremity Dislocation    Date/Time: 7/12/2022 7:12 PM  Performed by: Franki Abraham PA  Authorized by: Felipe Sanchez MD   Consent: Verbal consent obtained.  Injury location: wrist  Location details: right wrist  Injury type:  fracture-dislocation  Pre-procedure neurovascular assessment: neurovascularly intact  Pre-procedure distal perfusion: normal  Pre-procedure neurological function: normal  Pre-procedure range of motion: reduced  Anesthesia: hematoma block    Anesthesia:  Local anesthesia used: yes  Local Anesthetic: lidocaine 1% without epinephrine  Manipulation performed: yes  Skeletal traction used: yes  Reduction successful: yes  X-ray confirmed reduction: yes  Immobilization: splint  Splint type: sugar tong  Post-procedure neurovascular assessment: post-procedure neurovascularly intact  Post-procedure distal perfusion: normal  Post-procedure neurological function: normal  Post-procedure range of motion: normal  Patient tolerance: patient tolerated the procedure well with no immediate complications            MEDICATIONS GIVEN IN ER    Medications   lidocaine (XYLOCAINE) 1 % injection 10 mL (10 mL Injection Given by Other 7/12/22 1200)   ondansetron (ZOFRAN) injection 4 mg (4 mg Intravenous Given 7/12/22 1037)   morphine injection 4 mg (4 mg Intravenous Given 7/12/22 1039)   HYDROmorphone (DILAUDID) injection 0.5 mg (0.5 mg Intravenous Given 7/12/22 1120)         PROGRESS, DATA ANALYSIS, CONSULTS, AND MEDICAL DECISION MAKING    All labs have been independently reviewed by me.  All radiology studies have been reviewed by me and discussed with radiologist dictating the report.   EKG's independently viewed and interpreted by me.  Discussion below represents my analysis of pertinent findings related to patient's condition, differential diagnosis, treatment plan and final disposition.    I have discussed case with Dr. Sanchez, emergency room physician.  He has performed his own bedside examination and agrees with treatment plan.    ED Course as of 07/12/22 1912 Tue Jul 12, 2022   1023 Patient presents with right wrist pain after mechanical fall prior to arrival.    Right wrist films interpreted myself show a comminuted, mildly  dorsally angulated distal radius fracture.    Plan for pain control, hematoma block, splinting. [EE]   1201 I discussed the case with Dr. Barillas, orthopedics.  He will see the patient now in his office. [EE]      ED Course User Index  [EE] Franki Abraham PA       AS OF 19:12 EDT VITALS:    BP - 159/77  HR - 95  TEMP - 98.7 °F (37.1 °C) (Tympanic)  O2 SATS - 98%        DIAGNOSIS  Final diagnoses:   Closed fracture of right wrist, initial encounter         DISPOSITION  Discharged      Dictated utilizing Dragon dictation     Franki Abraham PA  07/12/22 1913

## 2022-07-16 NOTE — TELEPHONE ENCOUNTER
Called pt and advised of Dr Zavala's note. Pt verb understanding and reports she is feeling better.  Update sent to Dr Zavala.     Pt is also asking is she having too many diverticulitis flares and pt is asking if it is ok to proceed with c/s on 10/06.    Advised will send message to Dr Zavala.    no

## 2022-10-31 ENCOUNTER — APPOINTMENT (OUTPATIENT)
Dept: WOMENS IMAGING | Facility: HOSPITAL | Age: 72
End: 2022-10-31

## 2022-10-31 PROCEDURE — 77067 SCR MAMMO BI INCL CAD: CPT | Performed by: RADIOLOGY

## 2022-10-31 PROCEDURE — 77063 BREAST TOMOSYNTHESIS BI: CPT | Performed by: RADIOLOGY

## 2024-02-15 ENCOUNTER — LAB (OUTPATIENT)
Dept: LAB | Facility: HOSPITAL | Age: 74
End: 2024-02-15
Payer: MEDICARE

## 2024-02-15 ENCOUNTER — TRANSCRIBE ORDERS (OUTPATIENT)
Dept: LAB | Facility: HOSPITAL | Age: 74
End: 2024-02-15
Payer: MEDICARE

## 2024-02-15 DIAGNOSIS — M79.7 FIBROMYALGIA: ICD-10-CM

## 2024-02-15 DIAGNOSIS — E78.6 HYPOLIPIDEMIA: ICD-10-CM

## 2024-02-15 DIAGNOSIS — R53.83 FATIGUE, UNSPECIFIED TYPE: ICD-10-CM

## 2024-02-15 DIAGNOSIS — E78.5 HYPERLIPIDEMIA, UNSPECIFIED HYPERLIPIDEMIA TYPE: ICD-10-CM

## 2024-02-15 DIAGNOSIS — E78.5 HYPERLIPIDEMIA, UNSPECIFIED HYPERLIPIDEMIA TYPE: Primary | ICD-10-CM

## 2024-02-15 LAB
ALBUMIN SERPL-MCNC: 4.2 G/DL (ref 3.5–5.2)
ALBUMIN/GLOB SERPL: 1.8 G/DL
ALP SERPL-CCNC: 33 U/L (ref 39–117)
ALT SERPL W P-5'-P-CCNC: 21 U/L (ref 1–33)
ANION GAP SERPL CALCULATED.3IONS-SCNC: 8.2 MMOL/L (ref 5–15)
AST SERPL-CCNC: 15 U/L (ref 1–32)
BILIRUB SERPL-MCNC: 0.2 MG/DL (ref 0–1.2)
BUN SERPL-MCNC: 16 MG/DL (ref 8–23)
BUN/CREAT SERPL: 21.3 (ref 7–25)
CALCIUM SPEC-SCNC: 9.1 MG/DL (ref 8.6–10.5)
CHLORIDE SERPL-SCNC: 106 MMOL/L (ref 98–107)
CO2 SERPL-SCNC: 26.8 MMOL/L (ref 22–29)
CREAT SERPL-MCNC: 0.75 MG/DL (ref 0.57–1)
DEPRECATED RDW RBC AUTO: 40 FL (ref 37–54)
EGFRCR SERPLBLD CKD-EPI 2021: 84.7 ML/MIN/1.73
ERYTHROCYTE [DISTWIDTH] IN BLOOD BY AUTOMATED COUNT: 13.1 % (ref 12.3–15.4)
GLOBULIN UR ELPH-MCNC: 2.3 GM/DL
GLUCOSE SERPL-MCNC: 98 MG/DL (ref 65–99)
HCT VFR BLD AUTO: 40.1 % (ref 34–46.6)
HGB BLD-MCNC: 13.1 G/DL (ref 12–15.9)
MCH RBC QN AUTO: 27.9 PG (ref 26.6–33)
MCHC RBC AUTO-ENTMCNC: 32.7 G/DL (ref 31.5–35.7)
MCV RBC AUTO: 85.5 FL (ref 79–97)
PLATELET # BLD AUTO: 316 10*3/MM3 (ref 140–450)
PMV BLD AUTO: 9.2 FL (ref 6–12)
POTASSIUM SERPL-SCNC: 4.5 MMOL/L (ref 3.5–5.2)
PROT SERPL-MCNC: 6.5 G/DL (ref 6–8.5)
RBC # BLD AUTO: 4.69 10*6/MM3 (ref 3.77–5.28)
SODIUM SERPL-SCNC: 141 MMOL/L (ref 136–145)
TSH SERPL DL<=0.05 MIU/L-ACNC: 0.77 UIU/ML (ref 0.27–4.2)
WBC NRBC COR # BLD AUTO: 6.36 10*3/MM3 (ref 3.4–10.8)

## 2024-02-15 PROCEDURE — 36415 COLL VENOUS BLD VENIPUNCTURE: CPT

## 2024-02-15 PROCEDURE — 84443 ASSAY THYROID STIM HORMONE: CPT

## 2024-02-15 PROCEDURE — 85027 COMPLETE CBC AUTOMATED: CPT

## 2024-02-15 PROCEDURE — 80053 COMPREHEN METABOLIC PANEL: CPT

## 2024-07-02 ENCOUNTER — TRANSCRIBE ORDERS (OUTPATIENT)
Dept: CARDIOLOGY | Facility: HOSPITAL | Age: 74
End: 2024-07-02
Payer: MEDICARE

## 2024-07-02 ENCOUNTER — LAB (OUTPATIENT)
Dept: LAB | Facility: HOSPITAL | Age: 74
End: 2024-07-02
Payer: MEDICARE

## 2024-07-02 DIAGNOSIS — E03.9 HYPOTHYROIDISM, UNSPECIFIED TYPE: ICD-10-CM

## 2024-07-02 DIAGNOSIS — E03.9 HYPOTHYROIDISM, UNSPECIFIED TYPE: Primary | ICD-10-CM

## 2024-07-02 LAB — TSH SERPL DL<=0.05 MIU/L-ACNC: 0.52 UIU/ML (ref 0.27–4.2)

## 2024-07-02 PROCEDURE — 36415 COLL VENOUS BLD VENIPUNCTURE: CPT

## 2024-07-02 PROCEDURE — 84443 ASSAY THYROID STIM HORMONE: CPT

## 2025-03-05 ENCOUNTER — TRANSCRIBE ORDERS (OUTPATIENT)
Dept: ADMINISTRATIVE | Facility: HOSPITAL | Age: 75
End: 2025-03-05
Payer: MEDICARE

## 2025-03-05 ENCOUNTER — LAB (OUTPATIENT)
Dept: LAB | Facility: HOSPITAL | Age: 75
End: 2025-03-05
Payer: MEDICARE

## 2025-03-05 DIAGNOSIS — R53.83 OTHER FATIGUE: ICD-10-CM

## 2025-03-05 DIAGNOSIS — E78.5 HYPERLIPIDEMIA, UNSPECIFIED HYPERLIPIDEMIA TYPE: ICD-10-CM

## 2025-03-05 DIAGNOSIS — E03.9 HYPOTHYROIDISM, UNSPECIFIED TYPE: Primary | ICD-10-CM

## 2025-03-05 DIAGNOSIS — E03.9 HYPOTHYROIDISM, UNSPECIFIED TYPE: ICD-10-CM

## 2025-03-05 LAB
ALBUMIN SERPL-MCNC: 4.3 G/DL (ref 3.5–5.2)
ALBUMIN/GLOB SERPL: 1.7 G/DL
ALP SERPL-CCNC: 38 U/L (ref 39–117)
ALT SERPL W P-5'-P-CCNC: 17 U/L (ref 1–33)
ANION GAP SERPL CALCULATED.3IONS-SCNC: 12.4 MMOL/L (ref 5–15)
AST SERPL-CCNC: 16 U/L (ref 1–32)
BASOPHILS # BLD AUTO: 0.07 10*3/MM3 (ref 0–0.2)
BASOPHILS NFR BLD AUTO: 0.8 % (ref 0–1.5)
BILIRUB SERPL-MCNC: 0.2 MG/DL (ref 0–1.2)
BUN SERPL-MCNC: 18 MG/DL (ref 8–23)
BUN/CREAT SERPL: 22.5 (ref 7–25)
CALCIUM SPEC-SCNC: 9.3 MG/DL (ref 8.6–10.5)
CHLORIDE SERPL-SCNC: 100 MMOL/L (ref 98–107)
CHOLEST SERPL-MCNC: 267 MG/DL (ref 0–200)
CO2 SERPL-SCNC: 27.6 MMOL/L (ref 22–29)
CREAT SERPL-MCNC: 0.8 MG/DL (ref 0.57–1)
DEPRECATED RDW RBC AUTO: 41.7 FL (ref 37–54)
EGFRCR SERPLBLD CKD-EPI 2021: 77.9 ML/MIN/1.73
EOSINOPHIL # BLD AUTO: 0.31 10*3/MM3 (ref 0–0.4)
EOSINOPHIL NFR BLD AUTO: 3.5 % (ref 0.3–6.2)
ERYTHROCYTE [DISTWIDTH] IN BLOOD BY AUTOMATED COUNT: 13 % (ref 12.3–15.4)
GLOBULIN UR ELPH-MCNC: 2.6 GM/DL
GLUCOSE SERPL-MCNC: 100 MG/DL (ref 65–99)
HCT VFR BLD AUTO: 44.2 % (ref 34–46.6)
HDLC SERPL-MCNC: 89 MG/DL (ref 40–60)
HGB BLD-MCNC: 14.4 G/DL (ref 12–15.9)
IMM GRANULOCYTES # BLD AUTO: 0.06 10*3/MM3 (ref 0–0.05)
IMM GRANULOCYTES NFR BLD AUTO: 0.7 % (ref 0–0.5)
LDLC SERPL CALC-MCNC: 162 MG/DL (ref 0–100)
LDLC/HDLC SERPL: 1.78 {RATIO}
LYMPHOCYTES # BLD AUTO: 1.96 10*3/MM3 (ref 0.7–3.1)
LYMPHOCYTES NFR BLD AUTO: 22.2 % (ref 19.6–45.3)
MCH RBC QN AUTO: 28.9 PG (ref 26.6–33)
MCHC RBC AUTO-ENTMCNC: 32.6 G/DL (ref 31.5–35.7)
MCV RBC AUTO: 88.8 FL (ref 79–97)
MONOCYTES # BLD AUTO: 0.61 10*3/MM3 (ref 0.1–0.9)
MONOCYTES NFR BLD AUTO: 6.9 % (ref 5–12)
NEUTROPHILS NFR BLD AUTO: 5.82 10*3/MM3 (ref 1.7–7)
NEUTROPHILS NFR BLD AUTO: 65.9 % (ref 42.7–76)
NRBC BLD AUTO-RTO: 0 /100 WBC (ref 0–0.2)
PLATELET # BLD AUTO: 349 10*3/MM3 (ref 140–450)
PMV BLD AUTO: 9.3 FL (ref 6–12)
POTASSIUM SERPL-SCNC: 4.2 MMOL/L (ref 3.5–5.2)
PROT SERPL-MCNC: 6.9 G/DL (ref 6–8.5)
RBC # BLD AUTO: 4.98 10*6/MM3 (ref 3.77–5.28)
SODIUM SERPL-SCNC: 140 MMOL/L (ref 136–145)
TRIGL SERPL-MCNC: 97 MG/DL (ref 0–150)
TSH SERPL DL<=0.05 MIU/L-ACNC: 1.92 UIU/ML (ref 0.27–4.2)
VLDLC SERPL-MCNC: 16 MG/DL (ref 5–40)
WBC NRBC COR # BLD AUTO: 8.83 10*3/MM3 (ref 3.4–10.8)

## 2025-03-05 PROCEDURE — 36415 COLL VENOUS BLD VENIPUNCTURE: CPT

## 2025-03-05 PROCEDURE — 85025 COMPLETE CBC W/AUTO DIFF WBC: CPT

## 2025-03-05 PROCEDURE — 80061 LIPID PANEL: CPT

## 2025-03-05 PROCEDURE — 84443 ASSAY THYROID STIM HORMONE: CPT

## 2025-03-05 PROCEDURE — 80053 COMPREHEN METABOLIC PANEL: CPT

## 2025-04-30 ENCOUNTER — TRANSCRIBE ORDERS (OUTPATIENT)
Dept: LAB | Facility: HOSPITAL | Age: 75
End: 2025-04-30
Payer: MEDICARE

## 2025-04-30 ENCOUNTER — LAB (OUTPATIENT)
Dept: LAB | Facility: HOSPITAL | Age: 75
End: 2025-04-30
Payer: MEDICARE

## 2025-04-30 DIAGNOSIS — E03.9 HYPOTHYROIDISM, UNSPECIFIED TYPE: ICD-10-CM

## 2025-04-30 DIAGNOSIS — E03.9 HYPOTHYROIDISM, UNSPECIFIED TYPE: Primary | ICD-10-CM

## 2025-04-30 LAB — TSH SERPL DL<=0.05 MIU/L-ACNC: 0.43 UIU/ML (ref 0.27–4.2)

## 2025-04-30 PROCEDURE — 36415 COLL VENOUS BLD VENIPUNCTURE: CPT

## 2025-04-30 PROCEDURE — 84443 ASSAY THYROID STIM HORMONE: CPT

## 2025-06-11 ENCOUNTER — TRANSCRIBE ORDERS (OUTPATIENT)
Dept: LAB | Facility: HOSPITAL | Age: 75
End: 2025-06-11
Payer: MEDICARE

## 2025-06-11 ENCOUNTER — LAB (OUTPATIENT)
Dept: LAB | Facility: HOSPITAL | Age: 75
End: 2025-06-11
Payer: MEDICARE

## 2025-06-11 DIAGNOSIS — E78.41 ELEVATED LIPOPROTEIN A LEVEL: ICD-10-CM

## 2025-06-11 DIAGNOSIS — E03.9 MYXEDEMA HEART DISEASE: ICD-10-CM

## 2025-06-11 DIAGNOSIS — E78.2 MIXED HYPERLIPIDEMIA: Primary | ICD-10-CM

## 2025-06-11 DIAGNOSIS — I10 ESSENTIAL HYPERTENSION, MALIGNANT: ICD-10-CM

## 2025-06-11 DIAGNOSIS — M05.9 RHEUMATOID ARTHRITIS WITH RHEUMATOID FACTOR, UNSPECIFIED: ICD-10-CM

## 2025-06-11 DIAGNOSIS — I51.9 MYXEDEMA HEART DISEASE: ICD-10-CM

## 2025-06-11 DIAGNOSIS — E78.2 MIXED HYPERLIPIDEMIA: ICD-10-CM

## 2025-06-11 LAB
ALBUMIN SERPL-MCNC: 4 G/DL (ref 3.5–5.2)
ALBUMIN/GLOB SERPL: 1.6 G/DL
ALP SERPL-CCNC: 51 U/L (ref 39–117)
ALT SERPL W P-5'-P-CCNC: 18 U/L (ref 1–33)
ANION GAP SERPL CALCULATED.3IONS-SCNC: 9.9 MMOL/L (ref 5–15)
AST SERPL-CCNC: 14 U/L (ref 1–32)
BILIRUB SERPL-MCNC: 0.2 MG/DL (ref 0–1.2)
BUN SERPL-MCNC: 15 MG/DL (ref 8–23)
BUN/CREAT SERPL: 21.7 (ref 7–25)
CALCIUM SPEC-SCNC: 9.6 MG/DL (ref 8.6–10.5)
CHLORIDE SERPL-SCNC: 102 MMOL/L (ref 98–107)
CHOLEST SERPL-MCNC: 194 MG/DL (ref 0–200)
CO2 SERPL-SCNC: 27.1 MMOL/L (ref 22–29)
CREAT SERPL-MCNC: 0.69 MG/DL (ref 0.57–1)
EGFRCR SERPLBLD CKD-EPI 2021: 91.2 ML/MIN/1.73
GLOBULIN UR ELPH-MCNC: 2.5 GM/DL
GLUCOSE SERPL-MCNC: 105 MG/DL (ref 65–99)
HDLC SERPL-MCNC: 70 MG/DL (ref 40–60)
LDLC SERPL CALC-MCNC: 104 MG/DL (ref 0–100)
LDLC/HDLC SERPL: 1.44 {RATIO}
POTASSIUM SERPL-SCNC: 4.4 MMOL/L (ref 3.5–5.2)
PROT SERPL-MCNC: 6.5 G/DL (ref 6–8.5)
SODIUM SERPL-SCNC: 139 MMOL/L (ref 136–145)
T4 FREE SERPL-MCNC: 1.86 NG/DL (ref 0.92–1.68)
TRIGL SERPL-MCNC: 115 MG/DL (ref 0–150)
TSH SERPL DL<=0.05 MIU/L-ACNC: 0.05 UIU/ML (ref 0.27–4.2)
VLDLC SERPL-MCNC: 20 MG/DL (ref 5–40)

## 2025-06-11 PROCEDURE — 0024U GLYCA NUC MR SPECTRSC QUAN: CPT

## 2025-06-11 PROCEDURE — 82172 ASSAY OF APOLIPOPROTEIN: CPT

## 2025-06-11 PROCEDURE — 84443 ASSAY THYROID STIM HORMONE: CPT

## 2025-06-11 PROCEDURE — 80053 COMPREHEN METABOLIC PANEL: CPT

## 2025-06-11 PROCEDURE — 36415 COLL VENOUS BLD VENIPUNCTURE: CPT

## 2025-06-11 PROCEDURE — 80061 LIPID PANEL: CPT

## 2025-06-11 PROCEDURE — 84439 ASSAY OF FREE THYROXINE: CPT

## 2025-06-11 PROCEDURE — 81599 UNLISTED MAAA: CPT

## 2025-06-13 LAB
APO B SERPL-MCNC: 101 MG/DL
CHOLEST SERPL-MCNC: 205 MG/DL (ref 100–199)
DIABETES RISK SCORE CALC: 26 (ref 0–39)
GLYCA SERPL-SCNC: 443 UMOL/L
HDLC SERPL-MCNC: 70 MG/DL
LDLC SERPL CALC-MCNC: 113 MG/DL (ref 0–99)
Lab: ABNORMAL
NONHDLC SERPL-MCNC: 135 MG/DL (ref 0–129)
TRIGL SERPL-MCNC: 129 MG/DL (ref 0–149)

## 2025-08-14 ENCOUNTER — TRANSCRIBE ORDERS (OUTPATIENT)
Dept: RESPIRATORY THERAPY | Facility: HOSPITAL | Age: 75
End: 2025-08-14
Payer: MEDICARE

## 2025-08-14 DIAGNOSIS — E78.2 MIXED HYPERLIPIDEMIA: Primary | ICD-10-CM

## 2025-08-14 DIAGNOSIS — E03.9 HYPOTHYROIDISM, ADULT: ICD-10-CM

## 2025-08-14 DIAGNOSIS — I10 BENIGN HYPERTENSION: ICD-10-CM

## 2025-08-14 DIAGNOSIS — E78.41 ELEVATED LIPOPROTEIN A LEVEL: ICD-10-CM

## 2025-08-18 ENCOUNTER — LAB (OUTPATIENT)
Dept: LAB | Facility: HOSPITAL | Age: 75
End: 2025-08-18
Payer: MEDICARE

## 2025-08-18 DIAGNOSIS — I10 BENIGN HYPERTENSION: ICD-10-CM

## 2025-08-18 DIAGNOSIS — E78.41 ELEVATED LIPOPROTEIN A LEVEL: ICD-10-CM

## 2025-08-18 DIAGNOSIS — E03.9 HYPOTHYROIDISM, ADULT: ICD-10-CM

## 2025-08-18 DIAGNOSIS — E78.2 MIXED HYPERLIPIDEMIA: ICD-10-CM

## 2025-08-18 LAB
ALBUMIN SERPL-MCNC: 3.9 G/DL (ref 3.5–5.2)
ALBUMIN/GLOB SERPL: 1.8 G/DL
ALP SERPL-CCNC: 48 U/L (ref 39–117)
ALT SERPL W P-5'-P-CCNC: 15 U/L (ref 1–33)
ANION GAP SERPL CALCULATED.3IONS-SCNC: 11 MMOL/L (ref 5–15)
AST SERPL-CCNC: 14 U/L (ref 1–32)
BILIRUB SERPL-MCNC: 0.2 MG/DL (ref 0–1.2)
BUN SERPL-MCNC: 12 MG/DL (ref 8–23)
BUN/CREAT SERPL: 17.9 (ref 7–25)
CALCIUM SPEC-SCNC: 8.9 MG/DL (ref 8.6–10.5)
CHLORIDE SERPL-SCNC: 102 MMOL/L (ref 98–107)
CHOLEST SERPL-MCNC: 188 MG/DL (ref 0–200)
CO2 SERPL-SCNC: 25 MMOL/L (ref 22–29)
CREAT SERPL-MCNC: 0.67 MG/DL (ref 0.57–1)
EGFRCR SERPLBLD CKD-EPI 2021: 91.8 ML/MIN/1.73
GLOBULIN UR ELPH-MCNC: 2.2 GM/DL
GLUCOSE SERPL-MCNC: 101 MG/DL (ref 65–99)
HDLC SERPL-MCNC: 69 MG/DL (ref 40–60)
LDLC SERPL CALC-MCNC: 103 MG/DL (ref 0–100)
LDLC/HDLC SERPL: 1.47 {RATIO}
POTASSIUM SERPL-SCNC: 4.1 MMOL/L (ref 3.5–5.2)
PROT SERPL-MCNC: 6.1 G/DL (ref 6–8.5)
SODIUM SERPL-SCNC: 138 MMOL/L (ref 136–145)
T4 FREE SERPL-MCNC: 1.34 NG/DL (ref 0.92–1.68)
TRIGL SERPL-MCNC: 89 MG/DL (ref 0–150)
TSH SERPL DL<=0.05 MIU/L-ACNC: 0.47 UIU/ML (ref 0.27–4.2)
VLDLC SERPL-MCNC: 16 MG/DL (ref 5–40)

## 2025-08-18 PROCEDURE — 80053 COMPREHEN METABOLIC PANEL: CPT

## 2025-08-18 PROCEDURE — 36415 COLL VENOUS BLD VENIPUNCTURE: CPT

## 2025-08-18 PROCEDURE — 82172 ASSAY OF APOLIPOPROTEIN: CPT

## 2025-08-18 PROCEDURE — 83695 ASSAY OF LIPOPROTEIN(A): CPT

## 2025-08-18 PROCEDURE — 84439 ASSAY OF FREE THYROXINE: CPT

## 2025-08-18 PROCEDURE — 80061 LIPID PANEL: CPT

## 2025-08-18 PROCEDURE — 84443 ASSAY THYROID STIM HORMONE: CPT

## 2025-08-19 LAB — LPA SERPL-SCNC: 212.4 NMOL/L

## 2025-08-21 LAB — APO B SERPL-MCNC: 94 MG/DL

## 2025-08-25 ENCOUNTER — TELEPHONE (OUTPATIENT)
Dept: GASTROENTEROLOGY | Facility: CLINIC | Age: 75
End: 2025-08-25
Payer: MEDICARE

## 2025-08-25 ENCOUNTER — PREP FOR SURGERY (OUTPATIENT)
Dept: OTHER | Facility: HOSPITAL | Age: 75
End: 2025-08-25
Payer: MEDICARE

## 2025-08-25 DIAGNOSIS — Z86.0101 HISTORY OF ADENOMATOUS POLYP OF COLON: ICD-10-CM

## 2025-08-25 DIAGNOSIS — Z12.11 ENCOUNTER FOR SCREENING FOR MALIGNANT NEOPLASM OF COLON: Primary | ICD-10-CM

## (undated) DEVICE — SNAR POLYP SENSATION STDOVL 27 240 BX40

## (undated) DEVICE — CANN O2 ETCO2 FITS ALL CONN CO2 SMPL A/ 7IN DISP LF

## (undated) DEVICE — THE TORRENT IRRIGATION SCOPE CONNECTOR IS USED WITH THE TORRENT IRRIGATION TUBING TO PROVIDE IRRIGATION FLUIDS SUCH AS STERILE WATER DURING GASTROINTESTINAL ENDOSCOPIC PROCEDURES WHEN USED IN CONJUNCTION WITH AN IRRIGATION PUMP (OR ELECTROSURGICAL UNIT).: Brand: TORRENT

## (undated) DEVICE — SENSR O2 OXIMAX FNGR A/ 18IN NONSTR

## (undated) DEVICE — TUBING, SUCTION, 1/4" X 10', STRAIGHT: Brand: MEDLINE

## (undated) DEVICE — ADAPT CLN BIOGUARD AIR/H2O DISP

## (undated) DEVICE — SINGLE-USE BIOPSY FORCEPS: Brand: RADIAL JAW 4

## (undated) DEVICE — LN SMPL CO2 SHTRM SD STREAM W/M LUER

## (undated) DEVICE — KT ORCA ORCAPOD DISP STRL